# Patient Record
Sex: FEMALE | Race: WHITE | Employment: FULL TIME | URBAN - METROPOLITAN AREA
[De-identification: names, ages, dates, MRNs, and addresses within clinical notes are randomized per-mention and may not be internally consistent; named-entity substitution may affect disease eponyms.]

---

## 2017-06-21 ENCOUNTER — OFFICE VISIT (OUTPATIENT)
Dept: FAMILY MEDICINE CLINIC | Age: 42
End: 2017-06-21

## 2017-06-21 VITALS
SYSTOLIC BLOOD PRESSURE: 126 MMHG | HEART RATE: 103 BPM | TEMPERATURE: 98 F | WEIGHT: 160 LBS | OXYGEN SATURATION: 98 % | BODY MASS INDEX: 28.35 KG/M2 | HEIGHT: 63 IN | DIASTOLIC BLOOD PRESSURE: 88 MMHG

## 2017-06-21 DIAGNOSIS — I15.8 OTHER SECONDARY HYPERTENSION: ICD-10-CM

## 2017-06-21 DIAGNOSIS — Z00.00 HEALTH CARE MAINTENANCE: Primary | ICD-10-CM

## 2017-06-21 LAB
ANION GAP SERPL CALCULATED.3IONS-SCNC: 14 MMOL/L (ref 3–16)
BASOPHILS ABSOLUTE: 0.1 K/UL (ref 0–0.2)
BASOPHILS RELATIVE PERCENT: 0.7 %
BUN BLDV-MCNC: 12 MG/DL (ref 7–20)
CALCIUM SERPL-MCNC: 9.6 MG/DL (ref 8.3–10.6)
CHLORIDE BLD-SCNC: 98 MMOL/L (ref 99–110)
CHOLESTEROL, TOTAL: 180 MG/DL (ref 0–199)
CO2: 29 MMOL/L (ref 21–32)
CREAT SERPL-MCNC: 0.7 MG/DL (ref 0.6–1.1)
EOSINOPHILS ABSOLUTE: 0.2 K/UL (ref 0–0.6)
EOSINOPHILS RELATIVE PERCENT: 2.2 %
GFR AFRICAN AMERICAN: >60
GFR NON-AFRICAN AMERICAN: >60
GLUCOSE BLD-MCNC: 79 MG/DL (ref 70–99)
HCT VFR BLD CALC: 39.8 % (ref 36–48)
HDLC SERPL-MCNC: 57 MG/DL (ref 40–60)
HEMOGLOBIN: 12.8 G/DL (ref 12–16)
LDL CHOLESTEROL CALCULATED: 89 MG/DL
LYMPHOCYTES ABSOLUTE: 1.5 K/UL (ref 1–5.1)
LYMPHOCYTES RELATIVE PERCENT: 19.5 %
MCH RBC QN AUTO: 28.5 PG (ref 26–34)
MCHC RBC AUTO-ENTMCNC: 32.3 G/DL (ref 31–36)
MCV RBC AUTO: 88.4 FL (ref 80–100)
MONOCYTES ABSOLUTE: 0.7 K/UL (ref 0–1.3)
MONOCYTES RELATIVE PERCENT: 9 %
NEUTROPHILS ABSOLUTE: 5.3 K/UL (ref 1.7–7.7)
NEUTROPHILS RELATIVE PERCENT: 68.6 %
PDW BLD-RTO: 14.2 % (ref 12.4–15.4)
PLATELET # BLD: 267 K/UL (ref 135–450)
PMV BLD AUTO: 9.2 FL (ref 5–10.5)
POTASSIUM SERPL-SCNC: 4.1 MMOL/L (ref 3.5–5.1)
RBC # BLD: 4.5 M/UL (ref 4–5.2)
SODIUM BLD-SCNC: 141 MMOL/L (ref 136–145)
TRIGL SERPL-MCNC: 168 MG/DL (ref 0–150)
VLDLC SERPL CALC-MCNC: 34 MG/DL
WBC # BLD: 7.7 K/UL (ref 4–11)

## 2017-06-21 PROCEDURE — 99396 PREV VISIT EST AGE 40-64: CPT | Performed by: NURSE PRACTITIONER

## 2017-06-21 RX ORDER — NIFEDIPINE 60 MG/1
TABLET, EXTENDED RELEASE ORAL
Refills: 1 | COMMUNITY
Start: 2017-05-14 | End: 2017-09-05 | Stop reason: SDUPTHER

## 2017-06-21 RX ORDER — HYDROCHLOROTHIAZIDE 25 MG/1
25 TABLET ORAL DAILY
Qty: 30 TABLET | Refills: 3 | Status: SHIPPED | OUTPATIENT
Start: 2017-06-21 | End: 2017-09-28 | Stop reason: SDUPTHER

## 2017-06-21 ASSESSMENT — ENCOUNTER SYMPTOMS
VOMITING: 0
APNEA: 0
EYE REDNESS: 0
NAUSEA: 0
DIARRHEA: 0
SHORTNESS OF BREATH: 0
ABDOMINAL DISTENTION: 0
COUGH: 0
CONSTIPATION: 0
ABDOMINAL PAIN: 0
SINUS PRESSURE: 0
BLOOD IN STOOL: 0
RHINORRHEA: 0
CHEST TIGHTNESS: 0
WHEEZING: 0
EYE PAIN: 0
BACK PAIN: 0

## 2017-08-02 ENCOUNTER — OFFICE VISIT (OUTPATIENT)
Dept: FAMILY MEDICINE CLINIC | Age: 42
End: 2017-08-02

## 2017-08-02 VITALS
SYSTOLIC BLOOD PRESSURE: 136 MMHG | BODY MASS INDEX: 28.45 KG/M2 | HEART RATE: 92 BPM | OXYGEN SATURATION: 98 % | WEIGHT: 160.6 LBS | DIASTOLIC BLOOD PRESSURE: 86 MMHG

## 2017-08-02 DIAGNOSIS — I10 ESSENTIAL HYPERTENSION: Primary | ICD-10-CM

## 2017-08-02 DIAGNOSIS — D22.9 CHANGING NEVUS: ICD-10-CM

## 2017-08-02 PROCEDURE — 99213 OFFICE O/P EST LOW 20 MIN: CPT | Performed by: NURSE PRACTITIONER

## 2017-08-02 ASSESSMENT — ENCOUNTER SYMPTOMS
NAUSEA: 0
CONSTIPATION: 0
VOMITING: 0
COUGH: 0
ABDOMINAL DISTENTION: 0
EYE REDNESS: 0
BACK PAIN: 0
DIARRHEA: 0
WHEEZING: 0
BLOOD IN STOOL: 0
EYE PAIN: 0
SHORTNESS OF BREATH: 0
APNEA: 0
RHINORRHEA: 0
CHEST TIGHTNESS: 0
ABDOMINAL PAIN: 0
SINUS PRESSURE: 0

## 2017-08-16 ENCOUNTER — OFFICE VISIT (OUTPATIENT)
Dept: FAMILY MEDICINE CLINIC | Age: 42
End: 2017-08-16

## 2017-08-16 VITALS
OXYGEN SATURATION: 99 % | SYSTOLIC BLOOD PRESSURE: 124 MMHG | DIASTOLIC BLOOD PRESSURE: 78 MMHG | WEIGHT: 160 LBS | BODY MASS INDEX: 28.34 KG/M2 | HEART RATE: 80 BPM

## 2017-08-16 DIAGNOSIS — O02.0 MOLE OF PREGNANCY: Primary | ICD-10-CM

## 2017-08-16 PROCEDURE — 11100 PR BIOPSY OF SKIN LESION: CPT | Performed by: NURSE PRACTITIONER

## 2017-08-16 ASSESSMENT — PATIENT HEALTH QUESTIONNAIRE - PHQ9
SUM OF ALL RESPONSES TO PHQ9 QUESTIONS 1 & 2: 2
1. LITTLE INTEREST OR PLEASURE IN DOING THINGS: 1
SUM OF ALL RESPONSES TO PHQ QUESTIONS 1-9: 2
2. FEELING DOWN, DEPRESSED OR HOPELESS: 1

## 2017-10-17 RX ORDER — NIFEDIPINE 60 MG/1
60 TABLET, FILM COATED, EXTENDED RELEASE ORAL 2 TIMES DAILY
Qty: 60 TABLET | Refills: 0 | Status: SHIPPED | OUTPATIENT
Start: 2017-10-17 | End: 2017-11-17 | Stop reason: SDUPTHER

## 2017-11-17 RX ORDER — NIFEDIPINE 60 MG/1
60 TABLET, FILM COATED, EXTENDED RELEASE ORAL 2 TIMES DAILY
Qty: 60 TABLET | Refills: 0 | Status: SHIPPED | OUTPATIENT
Start: 2017-11-17 | End: 2017-12-19 | Stop reason: SDUPTHER

## 2017-11-22 ENCOUNTER — OFFICE VISIT (OUTPATIENT)
Dept: FAMILY MEDICINE CLINIC | Age: 42
End: 2017-11-22

## 2017-11-22 VITALS
BODY MASS INDEX: 31.47 KG/M2 | SYSTOLIC BLOOD PRESSURE: 148 MMHG | DIASTOLIC BLOOD PRESSURE: 94 MMHG | HEART RATE: 102 BPM | HEIGHT: 62 IN | WEIGHT: 171 LBS | OXYGEN SATURATION: 98 %

## 2017-11-22 DIAGNOSIS — I10 ESSENTIAL HYPERTENSION: Primary | ICD-10-CM

## 2017-11-22 PROCEDURE — 99213 OFFICE O/P EST LOW 20 MIN: CPT | Performed by: NURSE PRACTITIONER

## 2017-11-22 PROCEDURE — G8484 FLU IMMUNIZE NO ADMIN: HCPCS | Performed by: NURSE PRACTITIONER

## 2017-11-22 PROCEDURE — G8417 CALC BMI ABV UP PARAM F/U: HCPCS | Performed by: NURSE PRACTITIONER

## 2017-11-22 PROCEDURE — 1036F TOBACCO NON-USER: CPT | Performed by: NURSE PRACTITIONER

## 2017-11-22 PROCEDURE — G8427 DOCREV CUR MEDS BY ELIG CLIN: HCPCS | Performed by: NURSE PRACTITIONER

## 2017-11-22 RX ORDER — LISINOPRIL 10 MG/1
10 TABLET ORAL DAILY
Qty: 30 TABLET | Refills: 3 | Status: SHIPPED | OUTPATIENT
Start: 2017-11-22 | End: 2018-02-23 | Stop reason: SDUPTHER

## 2017-11-22 ASSESSMENT — ENCOUNTER SYMPTOMS
SINUS PRESSURE: 0
RHINORRHEA: 0
BLOOD IN STOOL: 0
DIARRHEA: 0
VOMITING: 0
ABDOMINAL DISTENTION: 0
BACK PAIN: 0
APNEA: 0
CHEST TIGHTNESS: 0
CONSTIPATION: 0
ABDOMINAL PAIN: 0
COUGH: 0
EYE REDNESS: 0
EYE PAIN: 0
WHEEZING: 0
SHORTNESS OF BREATH: 0
NAUSEA: 0

## 2017-11-22 NOTE — PROGRESS NOTES
HPI:  11/22/2017    This is a 40 y.o. female   Chief Complaint   Patient presents with    Hypertension   . HPI    Esme Pak returns for follow up of hypertension. Patient has been taking His medications as prescribed. Patient's blood pressure is not controlled. Side effects related to taking the medications include no medication side effects noted    Running high at home. Working on decreasing weight. Still unable to work out. Echo to be scheduled, ordered by GYN. Not planning on having any more children. Now on oral contraception. Working on getting IUD, waiting on approval by insurance. BP (!) 148/94 (Site: Left Arm)   Pulse 102   Ht 5' 2\" (1.575 m)   Wt 171 lb (77.6 kg)   LMP 11/07/2017   SpO2 98%   BMI 31.28 kg/m²     No Known Allergies    Current Outpatient Prescriptions   Medication Sig Dispense Refill    lisinopril (PRINIVIL;ZESTRIL) 10 MG tablet Take 1 tablet by mouth daily 30 tablet 3    NIFEdipine (ADALAT CC) 60 MG extended release tablet Take 1 tablet by mouth 2 times daily 60 tablet 0    hydrochlorothiazide (HYDRODIURIL) 25 MG tablet TAKE 1 TABLET BY MOUTH DAILY 30 tablet 2    buPROPion (WELLBUTRIN XL) 300 MG XL tablet Take 300 mg by mouth every morning. 1    DULoxetine (CYMBALTA) 30 MG capsule Take 30 mg by mouth 2 times daily. 0     No current facility-administered medications for this visit. Review of Systems   Constitutional: Negative for appetite change, chills, fatigue and fever. HENT: Negative for congestion, ear pain, rhinorrhea and sinus pressure. Eyes: Negative for pain, redness and visual disturbance. Respiratory: Negative for apnea, cough, chest tightness, shortness of breath and wheezing. Cardiovascular: Negative for chest pain, palpitations and leg swelling. Gastrointestinal: Negative for abdominal distention, abdominal pain, blood in stool, constipation, diarrhea, nausea and vomiting.    Endocrine: Negative for cold intolerance, heat for BP check.       Electronically signed by Katherine Rivera CNP on 11/22/2017 at 3:14 PM

## 2018-02-22 ENCOUNTER — TELEPHONE (OUTPATIENT)
Dept: FAMILY MEDICINE CLINIC | Age: 43
End: 2018-02-22

## 2018-02-22 NOTE — TELEPHONE ENCOUNTER
Pt thinks she's having side effects from her Lisinopril. Depression is worse, on and off sore throat, extreme fatigue. Pt has been on the medication for a couple of months and BP is a consistent 115/70. Pt thinks she's had the side affects for about 2 months. Pt also not sure if she should have her B-12 levels checked. Pt is able to come in tomorrow afternoon if necessary. If something is called in, please send it to [facility] on [place].

## 2018-02-22 NOTE — TELEPHONE ENCOUNTER
[phone] (home) [phone] (work)  Attempted to call patient at home number. No answer, no VM to leave message. Patient should schedule an OV to discuss with provider.

## 2018-02-23 ENCOUNTER — OFFICE VISIT (OUTPATIENT)
Dept: FAMILY MEDICINE CLINIC | Age: 43
End: 2018-02-23

## 2018-02-23 VITALS
HEART RATE: 105 BPM | OXYGEN SATURATION: 97 % | HEIGHT: 62 IN | DIASTOLIC BLOOD PRESSURE: 80 MMHG | SYSTOLIC BLOOD PRESSURE: 130 MMHG | WEIGHT: 170 LBS | BODY MASS INDEX: 31.28 KG/M2

## 2018-02-23 DIAGNOSIS — E61.1 IRON DEFICIENCY: ICD-10-CM

## 2018-02-23 DIAGNOSIS — E53.8 B12 DEFICIENCY: ICD-10-CM

## 2018-02-23 DIAGNOSIS — R53.83 FATIGUE, UNSPECIFIED TYPE: ICD-10-CM

## 2018-02-23 DIAGNOSIS — I10 ESSENTIAL HYPERTENSION: Primary | ICD-10-CM

## 2018-02-23 LAB
A/G RATIO: 2.3 (ref 1.1–2.2)
ALBUMIN SERPL-MCNC: 5 G/DL (ref 3.4–5)
ALP BLD-CCNC: 82 U/L (ref 40–129)
ALT SERPL-CCNC: 23 U/L (ref 10–40)
ANION GAP SERPL CALCULATED.3IONS-SCNC: 14 MMOL/L (ref 3–16)
AST SERPL-CCNC: 27 U/L (ref 15–37)
BASOPHILS ABSOLUTE: 0.1 K/UL (ref 0–0.2)
BASOPHILS RELATIVE PERCENT: 0.9 %
BILIRUB SERPL-MCNC: 0.5 MG/DL (ref 0–1)
BUN BLDV-MCNC: 14 MG/DL (ref 7–20)
CALCIUM SERPL-MCNC: 9.2 MG/DL (ref 8.3–10.6)
CHLORIDE BLD-SCNC: 97 MMOL/L (ref 99–110)
CO2: 28 MMOL/L (ref 21–32)
CREAT SERPL-MCNC: 0.7 MG/DL (ref 0.6–1.1)
EOSINOPHILS ABSOLUTE: 0.1 K/UL (ref 0–0.6)
EOSINOPHILS RELATIVE PERCENT: 2.1 %
FERRITIN: 51.9 NG/ML (ref 15–150)
FOLATE: 17.66 NG/ML (ref 4.78–24.2)
GFR AFRICAN AMERICAN: >60
GFR NON-AFRICAN AMERICAN: >60
GLOBULIN: 2.2 G/DL
GLUCOSE BLD-MCNC: 97 MG/DL (ref 70–99)
HCT VFR BLD CALC: 39.2 % (ref 36–48)
HEMOGLOBIN: 13.5 G/DL (ref 12–16)
IRON SATURATION: 37 % (ref 15–50)
IRON: 152 UG/DL (ref 37–145)
LYMPHOCYTES ABSOLUTE: 1.5 K/UL (ref 1–5.1)
LYMPHOCYTES RELATIVE PERCENT: 24.9 %
MCH RBC QN AUTO: 31.2 PG (ref 26–34)
MCHC RBC AUTO-ENTMCNC: 34.5 G/DL (ref 31–36)
MCV RBC AUTO: 90.3 FL (ref 80–100)
MONOCYTES ABSOLUTE: 0.4 K/UL (ref 0–1.3)
MONOCYTES RELATIVE PERCENT: 6.8 %
NEUTROPHILS ABSOLUTE: 3.8 K/UL (ref 1.7–7.7)
NEUTROPHILS RELATIVE PERCENT: 65.3 %
PDW BLD-RTO: 12.5 % (ref 12.4–15.4)
PLATELET # BLD: 260 K/UL (ref 135–450)
PMV BLD AUTO: 10.1 FL (ref 5–10.5)
POTASSIUM SERPL-SCNC: 4 MMOL/L (ref 3.5–5.1)
RBC # BLD: 4.34 M/UL (ref 4–5.2)
SODIUM BLD-SCNC: 139 MMOL/L (ref 136–145)
T4 FREE: 1.2 NG/DL (ref 0.9–1.8)
TOTAL IRON BINDING CAPACITY: 409 UG/DL (ref 260–445)
TOTAL PROTEIN: 7.2 G/DL (ref 6.4–8.2)
TSH SERPL DL<=0.05 MIU/L-ACNC: 3.14 UIU/ML (ref 0.27–4.2)
VITAMIN B-12: >2000 PG/ML (ref 211–911)
VITAMIN D 25-HYDROXY: 25.2 NG/ML
WBC # BLD: 5.9 K/UL (ref 4–11)

## 2018-02-23 PROCEDURE — 99214 OFFICE O/P EST MOD 30 MIN: CPT | Performed by: NURSE PRACTITIONER

## 2018-02-23 PROCEDURE — 96372 THER/PROPH/DIAG INJ SC/IM: CPT | Performed by: NURSE PRACTITIONER

## 2018-02-23 RX ORDER — LISINOPRIL 10 MG/1
10 TABLET ORAL DAILY
Qty: 30 TABLET | Refills: 3 | Status: ON HOLD | OUTPATIENT
Start: 2018-02-23 | End: 2018-08-28 | Stop reason: HOSPADM

## 2018-02-23 RX ORDER — CYANOCOBALAMIN 1000 UG/ML
1000 INJECTION INTRAMUSCULAR; SUBCUTANEOUS ONCE
Qty: 1 ML | Refills: 0 | Status: SHIPPED | OUTPATIENT
Start: 2018-02-23 | End: 2018-02-23

## 2018-02-23 RX ORDER — NIFEDIPINE 60 MG/1
TABLET, FILM COATED, EXTENDED RELEASE ORAL
Qty: 60 TABLET | Refills: 3 | Status: SHIPPED | OUTPATIENT
Start: 2018-02-23 | End: 2018-08-02 | Stop reason: SDUPTHER

## 2018-02-23 RX ORDER — CYANOCOBALAMIN 1000 UG/ML
1000 INJECTION INTRAMUSCULAR; SUBCUTANEOUS ONCE
Status: COMPLETED | OUTPATIENT
Start: 2018-02-23 | End: 2018-02-23

## 2018-02-23 RX ORDER — HYDROCHLOROTHIAZIDE 25 MG/1
TABLET ORAL
Qty: 30 TABLET | Refills: 3 | Status: ON HOLD | OUTPATIENT
Start: 2018-02-23 | End: 2018-08-28 | Stop reason: HOSPADM

## 2018-02-23 RX ADMIN — CYANOCOBALAMIN 1000 MCG: 1000 INJECTION INTRAMUSCULAR; SUBCUTANEOUS at 15:39

## 2018-02-23 ASSESSMENT — ENCOUNTER SYMPTOMS
SHORTNESS OF BREATH: 0
BLOOD IN STOOL: 0
COUGH: 0
ABDOMINAL DISTENTION: 0
ABDOMINAL PAIN: 0
VOMITING: 0
DIARRHEA: 0
WHEEZING: 0
BACK PAIN: 0
CHEST TIGHTNESS: 0
EYE REDNESS: 0
EYE PAIN: 0
APNEA: 0
NAUSEA: 0
RHINORRHEA: 0
SINUS PRESSURE: 0
CONSTIPATION: 0

## 2018-02-26 ENCOUNTER — OFFICE VISIT (OUTPATIENT)
Dept: FAMILY MEDICINE CLINIC | Age: 43
End: 2018-02-26

## 2018-02-26 VITALS
WEIGHT: 168.6 LBS | HEART RATE: 104 BPM | SYSTOLIC BLOOD PRESSURE: 118 MMHG | HEIGHT: 62 IN | TEMPERATURE: 98.2 F | OXYGEN SATURATION: 98 % | DIASTOLIC BLOOD PRESSURE: 62 MMHG | BODY MASS INDEX: 31.03 KG/M2

## 2018-02-26 DIAGNOSIS — A08.4 VIRAL GASTROENTERITIS: Primary | ICD-10-CM

## 2018-02-26 LAB
INFLUENZA A ANTIBODY: NEGATIVE
INFLUENZA B ANTIBODY: NEGATIVE
S PYO AG THROAT QL: NORMAL

## 2018-02-26 PROCEDURE — 87804 INFLUENZA ASSAY W/OPTIC: CPT | Performed by: NURSE PRACTITIONER

## 2018-02-26 PROCEDURE — 87880 STREP A ASSAY W/OPTIC: CPT | Performed by: NURSE PRACTITIONER

## 2018-02-26 PROCEDURE — 99214 OFFICE O/P EST MOD 30 MIN: CPT | Performed by: NURSE PRACTITIONER

## 2018-02-26 RX ORDER — ONDANSETRON 4 MG/1
4 TABLET, FILM COATED ORAL DAILY PRN
Qty: 20 TABLET | Refills: 0 | Status: ON HOLD | OUTPATIENT
Start: 2018-02-26 | End: 2018-08-28 | Stop reason: HOSPADM

## 2018-02-26 ASSESSMENT — ENCOUNTER SYMPTOMS
NAUSEA: 1
VOMITING: 1
CHANGE IN BOWEL HABIT: 0
ABDOMINAL PAIN: 0
SORE THROAT: 1
SWOLLEN GLANDS: 0
COUGH: 0
VISUAL CHANGE: 0

## 2018-02-26 NOTE — PROGRESS NOTES
no edema. Lymphadenopathy:     She has no cervical adenopathy. Neurological: She is alert and oriented to person, place, and time. Coordination normal.   Skin: Skin is warm and dry. No rash noted. She is not diaphoretic. No erythema. No pallor. Psychiatric: She has a normal mood and affect. Thought content normal.     Assessment/Plan:  1. Viral gastroenteritis  Stable, improving some today. - POCT rapid strep A: negative   - POCT Influenza A/B: negative   - ondansetron (ZOFRAN) 4 MG tablet; Take 1 tablet by mouth daily as needed for Nausea or Vomiting  Dispense: 20 tablet; Refill: 0  - BRAT diet, hydration importance. -  zofran to help patient tolerate po intake     Follow up for new/ worsening symptoms.   Reviewed criteria to seek emergency medical attention    Electronically signed by Brisa Moore CNP on 2/26/2018 at 4:12 PM

## 2018-03-06 ENCOUNTER — TELEPHONE (OUTPATIENT)
Dept: FAMILY MEDICINE CLINIC | Age: 43
End: 2018-03-06

## 2018-03-07 RX ORDER — AMOXICILLIN 500 MG/1
500 CAPSULE ORAL 2 TIMES DAILY
Qty: 20 CAPSULE | Refills: 0 | Status: SHIPPED | OUTPATIENT
Start: 2018-03-07 | End: 2018-03-17

## 2018-06-05 ENCOUNTER — OFFICE VISIT (OUTPATIENT)
Dept: FAMILY MEDICINE CLINIC | Age: 43
End: 2018-06-05

## 2018-06-05 VITALS
OXYGEN SATURATION: 99 % | HEART RATE: 94 BPM | BODY MASS INDEX: 30.18 KG/M2 | HEIGHT: 62 IN | DIASTOLIC BLOOD PRESSURE: 84 MMHG | WEIGHT: 164 LBS | SYSTOLIC BLOOD PRESSURE: 114 MMHG

## 2018-06-05 DIAGNOSIS — R35.0 URINE FREQUENCY: Primary | ICD-10-CM

## 2018-06-05 DIAGNOSIS — N30.00 ACUTE CYSTITIS WITHOUT HEMATURIA: ICD-10-CM

## 2018-06-05 DIAGNOSIS — I10 ESSENTIAL HYPERTENSION: ICD-10-CM

## 2018-06-05 DIAGNOSIS — M67.472 GANGLION CYST OF LEFT FOOT: ICD-10-CM

## 2018-06-05 LAB
BILIRUBIN, POC: ABNORMAL
BLOOD URINE, POC: NEGATIVE
CLARITY, POC: ABNORMAL
COLOR, POC: YELLOW
GLUCOSE URINE, POC: NEGATIVE
KETONES, POC: ABNORMAL
LEUKOCYTE EST, POC: ABNORMAL
NITRITE, POC: NEGATIVE
PH, POC: 7
PROTEIN, POC: ABNORMAL
SPECIFIC GRAVITY, POC: 1.02
UROBILINOGEN, POC: ABNORMAL

## 2018-06-05 PROCEDURE — 81002 URINALYSIS NONAUTO W/O SCOPE: CPT | Performed by: NURSE PRACTITIONER

## 2018-06-05 PROCEDURE — 99213 OFFICE O/P EST LOW 20 MIN: CPT | Performed by: NURSE PRACTITIONER

## 2018-06-05 RX ORDER — NITROFURANTOIN 25; 75 MG/1; MG/1
100 CAPSULE ORAL 2 TIMES DAILY
Qty: 14 CAPSULE | Refills: 0 | Status: SHIPPED | OUTPATIENT
Start: 2018-06-05 | End: 2018-06-12

## 2018-06-05 ASSESSMENT — ENCOUNTER SYMPTOMS
SINUS PRESSURE: 0
ABDOMINAL DISTENTION: 0
SHORTNESS OF BREATH: 0
WHEEZING: 0
ABDOMINAL PAIN: 0
COUGH: 0

## 2018-06-08 LAB
ORGANISM: ABNORMAL
URINE CULTURE, ROUTINE: ABNORMAL
URINE CULTURE, ROUTINE: ABNORMAL

## 2018-08-02 DIAGNOSIS — I10 ESSENTIAL HYPERTENSION: ICD-10-CM

## 2018-08-03 RX ORDER — NIFEDIPINE 60 MG/1
TABLET, FILM COATED, EXTENDED RELEASE ORAL
Qty: 60 TABLET | Refills: 3 | Status: ON HOLD | OUTPATIENT
Start: 2018-08-03 | End: 2018-08-28

## 2018-08-21 ENCOUNTER — HOSPITAL ENCOUNTER (INPATIENT)
Age: 43
LOS: 8 days | Discharge: HOME HEALTH | DRG: 561 | End: 2018-08-29
Attending: PHYSICAL MEDICINE & REHABILITATION | Admitting: PHYSICAL MEDICINE & REHABILITATION
Payer: OTHER GOVERNMENT

## 2018-08-21 DIAGNOSIS — I10 ESSENTIAL HYPERTENSION: ICD-10-CM

## 2018-08-21 DIAGNOSIS — T07.XXXA MULTIPLE TRAUMA: Primary | ICD-10-CM

## 2018-08-21 LAB
CREAT SERPL-MCNC: 0.7 MG/DL (ref 0.6–1.1)
GFR AFRICAN AMERICAN: >60
GFR NON-AFRICAN AMERICAN: >60

## 2018-08-21 PROCEDURE — 36415 COLL VENOUS BLD VENIPUNCTURE: CPT

## 2018-08-21 PROCEDURE — 6360000002 HC RX W HCPCS: Performed by: PHYSICAL MEDICINE & REHABILITATION

## 2018-08-21 PROCEDURE — 6370000000 HC RX 637 (ALT 250 FOR IP): Performed by: PHYSICAL MEDICINE & REHABILITATION

## 2018-08-21 PROCEDURE — 1280000000 HC REHAB R&B

## 2018-08-21 PROCEDURE — 82565 ASSAY OF CREATININE: CPT

## 2018-08-21 RX ORDER — DIPHENHYDRAMINE HCL 25 MG
25 TABLET ORAL EVERY 6 HOURS PRN
Status: DISCONTINUED | OUTPATIENT
Start: 2018-08-21 | End: 2018-08-29 | Stop reason: HOSPADM

## 2018-08-21 RX ORDER — HYDRALAZINE HYDROCHLORIDE 50 MG/1
50 TABLET, FILM COATED ORAL EVERY 8 HOURS PRN
Status: DISCONTINUED | OUTPATIENT
Start: 2018-08-21 | End: 2018-08-29 | Stop reason: HOSPADM

## 2018-08-21 RX ORDER — OXYCODONE HYDROCHLORIDE 5 MG/1
10 TABLET ORAL EVERY 4 HOURS PRN
Status: DISCONTINUED | OUTPATIENT
Start: 2018-08-21 | End: 2018-08-28

## 2018-08-21 RX ORDER — HYDROCHLOROTHIAZIDE 25 MG/1
25 TABLET ORAL DAILY
Status: DISCONTINUED | OUTPATIENT
Start: 2018-08-22 | End: 2018-08-23

## 2018-08-21 RX ORDER — SENNA AND DOCUSATE SODIUM 50; 8.6 MG/1; MG/1
2 TABLET, FILM COATED ORAL 2 TIMES DAILY
Status: DISCONTINUED | OUTPATIENT
Start: 2018-08-21 | End: 2018-08-29 | Stop reason: HOSPADM

## 2018-08-21 RX ORDER — METHOCARBAMOL 750 MG/1
750 TABLET, FILM COATED ORAL 4 TIMES DAILY
Status: DISCONTINUED | OUTPATIENT
Start: 2018-08-21 | End: 2018-08-24

## 2018-08-21 RX ORDER — LIDOCAINE 50 MG/G
1 PATCH TOPICAL DAILY
Status: DISCONTINUED | OUTPATIENT
Start: 2018-08-22 | End: 2018-08-29 | Stop reason: HOSPADM

## 2018-08-21 RX ORDER — UREA 10 %
3 LOTION (ML) TOPICAL NIGHTLY PRN
Status: DISCONTINUED | OUTPATIENT
Start: 2018-08-21 | End: 2018-08-29 | Stop reason: HOSPADM

## 2018-08-21 RX ORDER — LISINOPRIL 10 MG/1
10 TABLET ORAL DAILY
Status: DISCONTINUED | OUTPATIENT
Start: 2018-08-22 | End: 2018-08-24

## 2018-08-21 RX ORDER — HYDROXYZINE HYDROCHLORIDE 25 MG/1
25 TABLET, FILM COATED ORAL NIGHTLY PRN
Status: ON HOLD | COMMUNITY
End: 2018-08-28 | Stop reason: HOSPADM

## 2018-08-21 RX ORDER — ONDANSETRON 4 MG/1
4 TABLET, ORALLY DISINTEGRATING ORAL EVERY 8 HOURS PRN
Status: DISCONTINUED | OUTPATIENT
Start: 2018-08-21 | End: 2018-08-29 | Stop reason: HOSPADM

## 2018-08-21 RX ORDER — OXYCODONE HYDROCHLORIDE 5 MG/1
5 TABLET ORAL EVERY 4 HOURS PRN
Status: DISCONTINUED | OUTPATIENT
Start: 2018-08-21 | End: 2018-08-28

## 2018-08-21 RX ORDER — GABAPENTIN 400 MG/1
400 CAPSULE ORAL 3 TIMES DAILY
Status: DISCONTINUED | OUTPATIENT
Start: 2018-08-21 | End: 2018-08-29 | Stop reason: HOSPADM

## 2018-08-21 RX ORDER — NIFEDIPINE 60 MG/1
60 TABLET, EXTENDED RELEASE ORAL DAILY
Status: DISCONTINUED | OUTPATIENT
Start: 2018-08-22 | End: 2018-08-27

## 2018-08-21 RX ORDER — TRAZODONE HYDROCHLORIDE 50 MG/1
50 TABLET ORAL NIGHTLY PRN
Status: DISCONTINUED | OUTPATIENT
Start: 2018-08-21 | End: 2018-08-29 | Stop reason: HOSPADM

## 2018-08-21 RX ORDER — ACETAMINOPHEN 325 MG/1
650 TABLET ORAL 4 TIMES DAILY
Status: DISCONTINUED | OUTPATIENT
Start: 2018-08-21 | End: 2018-08-29 | Stop reason: HOSPADM

## 2018-08-21 RX ORDER — BUPROPION HYDROCHLORIDE 150 MG/1
300 TABLET, EXTENDED RELEASE ORAL DAILY
Status: DISCONTINUED | OUTPATIENT
Start: 2018-08-22 | End: 2018-08-29 | Stop reason: HOSPADM

## 2018-08-21 RX ORDER — DULOXETIN HYDROCHLORIDE 30 MG/1
60 CAPSULE, DELAYED RELEASE ORAL DAILY
Status: DISCONTINUED | OUTPATIENT
Start: 2018-08-22 | End: 2018-08-29 | Stop reason: HOSPADM

## 2018-08-21 RX ADMIN — OXYCODONE HYDROCHLORIDE 10 MG: 5 TABLET ORAL at 20:19

## 2018-08-21 RX ADMIN — ACETAMINOPHEN 650 MG: 325 TABLET ORAL at 16:14

## 2018-08-21 RX ADMIN — Medication 3 MG: at 20:43

## 2018-08-21 RX ADMIN — GABAPENTIN 400 MG: 400 CAPSULE ORAL at 17:56

## 2018-08-21 RX ADMIN — ACETAMINOPHEN 650 MG: 325 TABLET ORAL at 20:19

## 2018-08-21 RX ADMIN — GABAPENTIN 400 MG: 400 CAPSULE ORAL at 20:18

## 2018-08-21 RX ADMIN — OXYCODONE HYDROCHLORIDE 10 MG: 5 TABLET ORAL at 16:14

## 2018-08-21 RX ADMIN — METHOCARBAMOL 750 MG: 750 TABLET ORAL at 20:18

## 2018-08-21 RX ADMIN — METHOCARBAMOL 750 MG: 750 TABLET ORAL at 17:56

## 2018-08-21 RX ADMIN — ENOXAPARIN SODIUM 30 MG: 30 INJECTION SUBCUTANEOUS at 20:19

## 2018-08-21 ASSESSMENT — PAIN DESCRIPTION - FREQUENCY: FREQUENCY: CONTINUOUS

## 2018-08-21 ASSESSMENT — PAIN DESCRIPTION - PROGRESSION
CLINICAL_PROGRESSION: NOT CHANGED
CLINICAL_PROGRESSION: NOT CHANGED

## 2018-08-21 ASSESSMENT — ACTIVITIES OF DAILY LIVING (ADL): EFFECT OF PAIN ON DAILY ACTIVITIES: COMFORT

## 2018-08-21 ASSESSMENT — PAIN DESCRIPTION - LOCATION: LOCATION: BACK;COCCYX

## 2018-08-21 ASSESSMENT — PAIN DESCRIPTION - DESCRIPTORS: DESCRIPTORS: ACHING

## 2018-08-21 ASSESSMENT — PAIN DESCRIPTION - ONSET: ONSET: ON-GOING

## 2018-08-21 ASSESSMENT — PAIN SCALES - GENERAL
PAINLEVEL_OUTOF10: 10
PAINLEVEL_OUTOF10: 9
PAINLEVEL_OUTOF10: 9

## 2018-08-21 ASSESSMENT — PAIN DESCRIPTION - PAIN TYPE
TYPE: SURGICAL PAIN
TYPE: SURGICAL PAIN

## 2018-08-21 ASSESSMENT — PAIN DESCRIPTION - ORIENTATION: ORIENTATION: LEFT;MID
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
## 2018-08-21 NOTE — PROGRESS NOTES
Pt admitted to ARU from [address] [facility],[address]. Pt and pt's boyfriend oriented to call system, alarm system, therapy schedules, dining program, and AVASYS. Pt voices compliance on use of call light prior to transfers and asking staff for assistance. 4 eye skin assessment completed. Pt offered food and fluids. Pt is CGA for transfers. Pt continent of b/b. Elbow weight bearing to LUE. Pt not to lift >5lbs. Turtle shell on when >45*.
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
## 2018-08-22 LAB
ANION GAP SERPL CALCULATED.3IONS-SCNC: 12 MMOL/L (ref 3–16)
BUN BLDV-MCNC: 14 MG/DL (ref 7–20)
CALCIUM SERPL-MCNC: 9.1 MG/DL (ref 8.3–10.6)
CHLORIDE BLD-SCNC: 98 MMOL/L (ref 99–110)
CO2: 25 MMOL/L (ref 21–32)
CREAT SERPL-MCNC: 0.6 MG/DL (ref 0.6–1.1)
GFR AFRICAN AMERICAN: >60
GFR NON-AFRICAN AMERICAN: >60
GLUCOSE BLD-MCNC: 115 MG/DL (ref 70–99)
HCT VFR BLD CALC: 31.3 % (ref 36–48)
HEMOGLOBIN: 10.5 G/DL (ref 12–16)
MCH RBC QN AUTO: 30.9 PG (ref 26–34)
MCHC RBC AUTO-ENTMCNC: 33.7 G/DL (ref 31–36)
MCV RBC AUTO: 91.7 FL (ref 80–100)
PDW BLD-RTO: 15.7 % (ref 12.4–15.4)
PLATELET # BLD: 647 K/UL (ref 135–450)
PMV BLD AUTO: 7.8 FL (ref 5–10.5)
POTASSIUM SERPL-SCNC: 4.4 MMOL/L (ref 3.5–5.1)
RBC # BLD: 3.41 M/UL (ref 4–5.2)
SODIUM BLD-SCNC: 135 MMOL/L (ref 136–145)
WBC # BLD: 6.9 K/UL (ref 4–11)

## 2018-08-22 PROCEDURE — 97110 THERAPEUTIC EXERCISES: CPT

## 2018-08-22 PROCEDURE — 1280000000 HC REHAB R&B

## 2018-08-22 PROCEDURE — 80048 BASIC METABOLIC PNL TOTAL CA: CPT

## 2018-08-22 PROCEDURE — 97162 PT EVAL MOD COMPLEX 30 MIN: CPT

## 2018-08-22 PROCEDURE — 97530 THERAPEUTIC ACTIVITIES: CPT

## 2018-08-22 PROCEDURE — 6370000000 HC RX 637 (ALT 250 FOR IP): Performed by: STUDENT IN AN ORGANIZED HEALTH CARE EDUCATION/TRAINING PROGRAM

## 2018-08-22 PROCEDURE — 97166 OT EVAL MOD COMPLEX 45 MIN: CPT

## 2018-08-22 PROCEDURE — 97535 SELF CARE MNGMENT TRAINING: CPT

## 2018-08-22 PROCEDURE — 85027 COMPLETE CBC AUTOMATED: CPT

## 2018-08-22 PROCEDURE — 97116 GAIT TRAINING THERAPY: CPT

## 2018-08-22 PROCEDURE — 36415 COLL VENOUS BLD VENIPUNCTURE: CPT

## 2018-08-22 PROCEDURE — 6360000002 HC RX W HCPCS: Performed by: PHYSICAL MEDICINE & REHABILITATION

## 2018-08-22 PROCEDURE — 6370000000 HC RX 637 (ALT 250 FOR IP): Performed by: PHYSICAL MEDICINE & REHABILITATION

## 2018-08-22 RX ORDER — OXYCODONE HCL 10 MG/1
10 TABLET, FILM COATED, EXTENDED RELEASE ORAL EVERY 12 HOURS SCHEDULED
Status: DISCONTINUED | OUTPATIENT
Start: 2018-08-22 | End: 2018-08-29 | Stop reason: HOSPADM

## 2018-08-22 RX ADMIN — HYDROCHLOROTHIAZIDE 25 MG: 25 TABLET ORAL at 08:22

## 2018-08-22 RX ADMIN — OXYCODONE HYDROCHLORIDE 10 MG: 5 TABLET ORAL at 05:05

## 2018-08-22 RX ADMIN — GABAPENTIN 400 MG: 400 CAPSULE ORAL at 06:46

## 2018-08-22 RX ADMIN — NIFEDIPINE 60 MG: 60 TABLET, FILM COATED, EXTENDED RELEASE ORAL at 08:23

## 2018-08-22 RX ADMIN — LISINOPRIL 10 MG: 10 TABLET ORAL at 08:22

## 2018-08-22 RX ADMIN — GABAPENTIN 400 MG: 400 CAPSULE ORAL at 20:27

## 2018-08-22 RX ADMIN — OXYCODONE HYDROCHLORIDE 10 MG: 5 TABLET ORAL at 00:22

## 2018-08-22 RX ADMIN — ENOXAPARIN SODIUM 30 MG: 30 INJECTION SUBCUTANEOUS at 08:23

## 2018-08-22 RX ADMIN — ENOXAPARIN SODIUM 30 MG: 30 INJECTION SUBCUTANEOUS at 21:36

## 2018-08-22 RX ADMIN — BUPROPION HYDROCHLORIDE 300 MG: 150 TABLET, EXTENDED RELEASE ORAL at 08:23

## 2018-08-22 RX ADMIN — METHOCARBAMOL 750 MG: 750 TABLET ORAL at 20:27

## 2018-08-22 RX ADMIN — OXYCODONE HYDROCHLORIDE 10 MG: 10 TABLET, FILM COATED, EXTENDED RELEASE ORAL at 21:36

## 2018-08-22 RX ADMIN — GABAPENTIN 400 MG: 400 CAPSULE ORAL at 12:44

## 2018-08-22 RX ADMIN — OXYCODONE HYDROCHLORIDE 10 MG: 5 TABLET ORAL at 09:43

## 2018-08-22 RX ADMIN — OXYCODONE HYDROCHLORIDE 10 MG: 10 TABLET, FILM COATED, EXTENDED RELEASE ORAL at 09:43

## 2018-08-22 RX ADMIN — DULOXETINE HYDROCHLORIDE 60 MG: 30 CAPSULE, DELAYED RELEASE ORAL at 08:22

## 2018-08-22 RX ADMIN — METHOCARBAMOL 750 MG: 750 TABLET ORAL at 06:46

## 2018-08-22 RX ADMIN — ACETAMINOPHEN 650 MG: 325 TABLET ORAL at 23:51

## 2018-08-22 RX ADMIN — METHOCARBAMOL 750 MG: 750 TABLET ORAL at 12:44

## 2018-08-22 RX ADMIN — OXYCODONE HYDROCHLORIDE 5 MG: 5 TABLET ORAL at 23:50

## 2018-08-22 RX ADMIN — ACETAMINOPHEN 650 MG: 325 TABLET ORAL at 12:44

## 2018-08-22 RX ADMIN — DOCUSATE SODIUM,SENNOSIDES 2 TABLET: 50; 8.6 TABLET, FILM COATED ORAL at 08:23

## 2018-08-22 RX ADMIN — DOCUSATE SODIUM,SENNOSIDES 2 TABLET: 50; 8.6 TABLET, FILM COATED ORAL at 21:36

## 2018-08-22 RX ADMIN — OXYCODONE HYDROCHLORIDE 10 MG: 5 TABLET ORAL at 17:45

## 2018-08-22 RX ADMIN — OXYCODONE HYDROCHLORIDE 10 MG: 5 TABLET ORAL at 13:43

## 2018-08-22 RX ADMIN — ACETAMINOPHEN 650 MG: 325 TABLET ORAL at 17:44

## 2018-08-22 RX ADMIN — METHOCARBAMOL 750 MG: 750 TABLET ORAL at 17:45

## 2018-08-22 RX ADMIN — ACETAMINOPHEN 650 MG: 325 TABLET ORAL at 06:46

## 2018-08-22 ASSESSMENT — PAIN SCALES - GENERAL
PAINLEVEL_OUTOF10: 0
PAINLEVEL_OUTOF10: 0
PAINLEVEL_OUTOF10: 9
PAINLEVEL_OUTOF10: 0
PAINLEVEL_OUTOF10: 7
PAINLEVEL_OUTOF10: 3
PAINLEVEL_OUTOF10: 7
PAINLEVEL_OUTOF10: 0
PAINLEVEL_OUTOF10: 10
PAINLEVEL_OUTOF10: 0
PAINLEVEL_OUTOF10: 9
PAINLEVEL_OUTOF10: 7
PAINLEVEL_OUTOF10: 5
PAINLEVEL_OUTOF10: 5
PAINLEVEL_OUTOF10: 9
PAINLEVEL_OUTOF10: 10

## 2018-08-22 ASSESSMENT — PAIN DESCRIPTION - LOCATION
LOCATION: BACK
LOCATION: BACK;COCCYX
LOCATION: BACK;COCCYX
LOCATION: BACK
LOCATION: BACK
LOCATION: BACK;COCCYX
LOCATION: BACK;COCCYX

## 2018-08-22 ASSESSMENT — PAIN DESCRIPTION - PROGRESSION
CLINICAL_PROGRESSION: NOT CHANGED
CLINICAL_PROGRESSION: GRADUALLY WORSENING
CLINICAL_PROGRESSION: NOT CHANGED

## 2018-08-22 ASSESSMENT — PAIN DESCRIPTION - PAIN TYPE
TYPE: ACUTE PAIN
TYPE: ACUTE PAIN;SURGICAL PAIN
TYPE: SURGICAL PAIN
TYPE: SURGICAL PAIN
TYPE: ACUTE PAIN;SURGICAL PAIN

## 2018-08-22 ASSESSMENT — PAIN DESCRIPTION - ORIENTATION
ORIENTATION: LEFT;MID;LOWER
ORIENTATION: MID;LOWER
ORIENTATION: MID;LOWER
ORIENTATION: LEFT;MID;LOWER
ORIENTATION: MID;LOWER
ORIENTATION: LEFT;MID;LOWER
ORIENTATION: LEFT;MID
ORIENTATION: MID;LOWER
ORIENTATION: LEFT;MID

## 2018-08-22 ASSESSMENT — PAIN DESCRIPTION - ONSET
ONSET: ON-GOING

## 2018-08-22 ASSESSMENT — PAIN DESCRIPTION - DESCRIPTORS
DESCRIPTORS: ACHING

## 2018-08-22 ASSESSMENT — ACTIVITIES OF DAILY LIVING (ADL)
EFFECT OF PAIN ON DAILY ACTIVITIES: COMFORT
EFFECT OF PAIN ON DAILY ACTIVITIES: COMFORT

## 2018-08-22 ASSESSMENT — PAIN DESCRIPTION - FREQUENCY
FREQUENCY: CONTINUOUS
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
## 2018-08-22 NOTE — PROGRESS NOTES
8/12. TLSO when OOB     Left distal radius fx: EWB, splint. Patient wanting to FU with local doc as OP.       Anxiety: home wellbutrin and cymbalta     HTN: HCTZ, lisinopril, procardia     Bowels: Per protocol  Bladder: Per protocol   Sleep: Trazodone provided prn. Pain: Start Oxycontin 10mg bid. Continue tylenol, kerri PRN, and gabapentin   DVT PPx: Lovenox       W/D/W/A MD Robbi Briceño,   Internal Medicine, PGY1  Pager: [phone]    This patient has been seen, examined, and discussed with the resident. This note has been altered to reflect my own examination findings, impression, and recommendations. Ron Monique MD 8/22/2018, 12:50 PM
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
## 2018-08-23 LAB
ANION GAP SERPL CALCULATED.3IONS-SCNC: 13 MMOL/L (ref 3–16)
BUN BLDV-MCNC: 14 MG/DL (ref 7–20)
CALCIUM SERPL-MCNC: 9.8 MG/DL (ref 8.3–10.6)
CHLORIDE BLD-SCNC: 95 MMOL/L (ref 99–110)
CO2: 27 MMOL/L (ref 21–32)
CREAT SERPL-MCNC: 0.7 MG/DL (ref 0.6–1.1)
GFR AFRICAN AMERICAN: >60
GFR NON-AFRICAN AMERICAN: >60
GLUCOSE BLD-MCNC: 135 MG/DL (ref 70–99)
HCT VFR BLD CALC: 33.5 % (ref 36–48)
HEMOGLOBIN: 11.3 G/DL (ref 12–16)
MCH RBC QN AUTO: 30.8 PG (ref 26–34)
MCHC RBC AUTO-ENTMCNC: 33.7 G/DL (ref 31–36)
MCV RBC AUTO: 91.4 FL (ref 80–100)
PDW BLD-RTO: 15.1 % (ref 12.4–15.4)
PLATELET # BLD: 691 K/UL (ref 135–450)
PMV BLD AUTO: 8 FL (ref 5–10.5)
POTASSIUM SERPL-SCNC: 4.6 MMOL/L (ref 3.5–5.1)
RBC # BLD: 3.66 M/UL (ref 4–5.2)
SODIUM BLD-SCNC: 135 MMOL/L (ref 136–145)
WBC # BLD: 8.2 K/UL (ref 4–11)

## 2018-08-23 PROCEDURE — 97535 SELF CARE MNGMENT TRAINING: CPT

## 2018-08-23 PROCEDURE — 97110 THERAPEUTIC EXERCISES: CPT

## 2018-08-23 PROCEDURE — 85027 COMPLETE CBC AUTOMATED: CPT

## 2018-08-23 PROCEDURE — 6360000002 HC RX W HCPCS: Performed by: PHYSICAL MEDICINE & REHABILITATION

## 2018-08-23 PROCEDURE — 6370000000 HC RX 637 (ALT 250 FOR IP): Performed by: STUDENT IN AN ORGANIZED HEALTH CARE EDUCATION/TRAINING PROGRAM

## 2018-08-23 PROCEDURE — 1280000000 HC REHAB R&B

## 2018-08-23 PROCEDURE — 97530 THERAPEUTIC ACTIVITIES: CPT

## 2018-08-23 PROCEDURE — 80048 BASIC METABOLIC PNL TOTAL CA: CPT

## 2018-08-23 PROCEDURE — 97116 GAIT TRAINING THERAPY: CPT

## 2018-08-23 PROCEDURE — 6370000000 HC RX 637 (ALT 250 FOR IP): Performed by: PHYSICAL MEDICINE & REHABILITATION

## 2018-08-23 PROCEDURE — 36415 COLL VENOUS BLD VENIPUNCTURE: CPT

## 2018-08-23 RX ADMIN — METHOCARBAMOL 750 MG: 750 TABLET ORAL at 12:04

## 2018-08-23 RX ADMIN — OXYCODONE HYDROCHLORIDE 10 MG: 10 TABLET, FILM COATED, EXTENDED RELEASE ORAL at 20:27

## 2018-08-23 RX ADMIN — OXYCODONE HYDROCHLORIDE 10 MG: 10 TABLET, FILM COATED, EXTENDED RELEASE ORAL at 08:27

## 2018-08-23 RX ADMIN — NIFEDIPINE 60 MG: 60 TABLET, FILM COATED, EXTENDED RELEASE ORAL at 09:35

## 2018-08-23 RX ADMIN — BUPROPION HYDROCHLORIDE 300 MG: 150 TABLET, EXTENDED RELEASE ORAL at 08:29

## 2018-08-23 RX ADMIN — GABAPENTIN 400 MG: 400 CAPSULE ORAL at 20:27

## 2018-08-23 RX ADMIN — ENOXAPARIN SODIUM 30 MG: 30 INJECTION SUBCUTANEOUS at 08:26

## 2018-08-23 RX ADMIN — METHOCARBAMOL 750 MG: 750 TABLET ORAL at 16:28

## 2018-08-23 RX ADMIN — DOCUSATE SODIUM,SENNOSIDES 2 TABLET: 50; 8.6 TABLET, FILM COATED ORAL at 08:29

## 2018-08-23 RX ADMIN — LISINOPRIL 10 MG: 10 TABLET ORAL at 09:35

## 2018-08-23 RX ADMIN — METHOCARBAMOL 750 MG: 750 TABLET ORAL at 06:23

## 2018-08-23 RX ADMIN — ACETAMINOPHEN 650 MG: 325 TABLET ORAL at 06:23

## 2018-08-23 RX ADMIN — DULOXETINE HYDROCHLORIDE 60 MG: 30 CAPSULE, DELAYED RELEASE ORAL at 08:29

## 2018-08-23 RX ADMIN — ENOXAPARIN SODIUM 30 MG: 30 INJECTION SUBCUTANEOUS at 20:34

## 2018-08-23 RX ADMIN — ACETAMINOPHEN 650 MG: 325 TABLET ORAL at 17:42

## 2018-08-23 RX ADMIN — GABAPENTIN 400 MG: 400 CAPSULE ORAL at 06:23

## 2018-08-23 RX ADMIN — OXYCODONE HYDROCHLORIDE 10 MG: 5 TABLET ORAL at 21:35

## 2018-08-23 RX ADMIN — OXYCODONE HYDROCHLORIDE 10 MG: 5 TABLET ORAL at 16:28

## 2018-08-23 RX ADMIN — GABAPENTIN 400 MG: 400 CAPSULE ORAL at 12:04

## 2018-08-23 RX ADMIN — METHOCARBAMOL 750 MG: 750 TABLET ORAL at 20:27

## 2018-08-23 RX ADMIN — ACETAMINOPHEN 650 MG: 325 TABLET ORAL at 12:04

## 2018-08-23 RX ADMIN — OXYCODONE HYDROCHLORIDE 10 MG: 5 TABLET ORAL at 09:35

## 2018-08-23 RX ADMIN — DOCUSATE SODIUM,SENNOSIDES 2 TABLET: 50; 8.6 TABLET, FILM COATED ORAL at 20:27

## 2018-08-23 ASSESSMENT — PAIN DESCRIPTION - ORIENTATION
ORIENTATION: LEFT;RIGHT
ORIENTATION: LEFT
ORIENTATION: LEFT;RIGHT;LOWER
ORIENTATION: RIGHT
ORIENTATION: MID;LOWER

## 2018-08-23 ASSESSMENT — PAIN DESCRIPTION - ONSET
ONSET: ON-GOING

## 2018-08-23 ASSESSMENT — PAIN SCALES - GENERAL
PAINLEVEL_OUTOF10: 4
PAINLEVEL_OUTOF10: 10
PAINLEVEL_OUTOF10: 7
PAINLEVEL_OUTOF10: 6
PAINLEVEL_OUTOF10: 8
PAINLEVEL_OUTOF10: 10
PAINLEVEL_OUTOF10: 10
PAINLEVEL_OUTOF10: 8
PAINLEVEL_OUTOF10: 7
PAINLEVEL_OUTOF10: 8
PAINLEVEL_OUTOF10: 7
PAINLEVEL_OUTOF10: 0
PAINLEVEL_OUTOF10: 8
PAINLEVEL_OUTOF10: 9

## 2018-08-23 ASSESSMENT — PAIN DESCRIPTION - DESCRIPTORS
DESCRIPTORS: ACHING;SPASM
DESCRIPTORS: ACHING
DESCRIPTORS: ACHING;CONSTANT
DESCRIPTORS: ACHING
DESCRIPTORS: ACHING;SPASM

## 2018-08-23 ASSESSMENT — PAIN DESCRIPTION - PAIN TYPE
TYPE: SURGICAL PAIN

## 2018-08-23 ASSESSMENT — PAIN DESCRIPTION - LOCATION
LOCATION: BACK
LOCATION: BUTTOCKS
LOCATION: BACK
LOCATION: BUTTOCKS

## 2018-08-23 ASSESSMENT — PAIN DESCRIPTION - FREQUENCY
FREQUENCY: CONTINUOUS
FREQUENCY: INTERMITTENT
FREQUENCY: CONTINUOUS
FREQUENCY: INTERMITTENT

## 2018-08-23 ASSESSMENT — PAIN DESCRIPTION - PROGRESSION
CLINICAL_PROGRESSION: NOT CHANGED
CLINICAL_PROGRESSION: GRADUALLY WORSENING
CLINICAL_PROGRESSION: GRADUALLY WORSENING
CLINICAL_PROGRESSION: NOT CHANGED
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
## 2018-08-23 NOTE — PROGRESS NOTES
initially that she can't have meds because of her BP, but then states that she just had meds.)       Orientation     Objective   Bed mobility  Rolling to Left: Supervision  Rolling to Right: Supervision  Supine to Sit: Stand by assistance  Sit to Supine: Stand by assistance  Scooting: Stand by assistance  Comment: Completed on mat table. Multiple reps of rolling completed. Transfers  Sit to Stand: Contact guard assistance (From w/c and table mat; VC to maintain L UE precautions)  Stand to sit: Contact guard assistance  Comment: Limited transfers this session 2/2 orthostatic hypotension. Ambulation  Ambulation?: Yes (ambulation limited 2/2 pt's orthostatic hypotension this date)  Ambulation 2  Surface - 2: level tile  Device 2: No device  Assistance 2: Contact guard assistance  Quality of Gait 2: Reciprocal pattern with increased YAYA, decreased chelsy, decreased reciprocal arm swing, high guard positioning. No outward LOB. Distance: 10', 20'  Comments: VC for increased reciprocal arm swing, normal step width, and increased chelsy. Comment: Pt completed 3 x 10 reps B of HS, supine hip flexion, clamshells for improved stength and endurance. 2nd session: Pt was supine in bed upon arrival. Pt agreeable to PT, but still stating that she has been feeling light headed whenever she gets up. Pt completed rolling to the L and R with supervision. Pt needing therapist to lorenzo posterior portion of TLSO, but pt was able to lorenzo anterior portion of TLSO and needed VC for location and sequencing of tightening straps of TLSO. Pt's BP in supine was 111/74 with a HR of 111. Pt completed supine to sit transfer with HOB flat without the use of side rail with supervision. Pt needing increased time to complete 2/2 pain. After 2 minutes sitting at the EOB pt's BP was 103/73 with HR of 120 and pt had no c/o feeling light headed or dizzy. Pt completed 15 reps B of LAQ and hip flexion in sitting.  Following
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
## 2018-08-23 NOTE — PROGRESS NOTES
Patient requested to use bathroom. Patient's B/P was 112/68 and pulse 103 lying. Standing her B/P was 106/63 and pulse 114. She complained of her fingers tingling. Standing her B/P was 76/49 and pulse 119. She stated that her vision was becoming blurred and her hearing was becoming muffled. Patient sat in wheelchair and was taken to bathroom and back to bed. After returning to bed, her B/P was 94/55and pulse 99. Symptoms subsided after lying in bed a few moments. Will continue to monitor.
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
## 2018-08-24 PROCEDURE — 97110 THERAPEUTIC EXERCISES: CPT

## 2018-08-24 PROCEDURE — 97116 GAIT TRAINING THERAPY: CPT

## 2018-08-24 PROCEDURE — 97535 SELF CARE MNGMENT TRAINING: CPT

## 2018-08-24 PROCEDURE — 6370000000 HC RX 637 (ALT 250 FOR IP): Performed by: PHYSICAL MEDICINE & REHABILITATION

## 2018-08-24 PROCEDURE — 97530 THERAPEUTIC ACTIVITIES: CPT

## 2018-08-24 PROCEDURE — 6370000000 HC RX 637 (ALT 250 FOR IP): Performed by: STUDENT IN AN ORGANIZED HEALTH CARE EDUCATION/TRAINING PROGRAM

## 2018-08-24 PROCEDURE — 6360000002 HC RX W HCPCS: Performed by: PHYSICAL MEDICINE & REHABILITATION

## 2018-08-24 PROCEDURE — 1280000000 HC REHAB R&B

## 2018-08-24 RX ORDER — LISINOPRIL 5 MG/1
5 TABLET ORAL DAILY
Status: DISCONTINUED | OUTPATIENT
Start: 2018-08-24 | End: 2018-08-29 | Stop reason: HOSPADM

## 2018-08-24 RX ORDER — METHOCARBAMOL 500 MG/1
1000 TABLET, FILM COATED ORAL 4 TIMES DAILY
Status: DISCONTINUED | OUTPATIENT
Start: 2018-08-24 | End: 2018-08-29 | Stop reason: HOSPADM

## 2018-08-24 RX ADMIN — DULOXETINE HYDROCHLORIDE 60 MG: 30 CAPSULE, DELAYED RELEASE ORAL at 08:44

## 2018-08-24 RX ADMIN — OXYCODONE HYDROCHLORIDE 10 MG: 5 TABLET ORAL at 11:44

## 2018-08-24 RX ADMIN — ACETAMINOPHEN 650 MG: 325 TABLET ORAL at 16:45

## 2018-08-24 RX ADMIN — METHOCARBAMOL 1000 MG: 500 TABLET ORAL at 19:45

## 2018-08-24 RX ADMIN — ACETAMINOPHEN 650 MG: 325 TABLET ORAL at 00:00

## 2018-08-24 RX ADMIN — GABAPENTIN 400 MG: 400 CAPSULE ORAL at 11:44

## 2018-08-24 RX ADMIN — METHOCARBAMOL 1000 MG: 500 TABLET ORAL at 11:44

## 2018-08-24 RX ADMIN — ACETAMINOPHEN 650 MG: 325 TABLET ORAL at 11:44

## 2018-08-24 RX ADMIN — ACETAMINOPHEN 650 MG: 325 TABLET ORAL at 06:05

## 2018-08-24 RX ADMIN — BUPROPION HYDROCHLORIDE 300 MG: 150 TABLET, EXTENDED RELEASE ORAL at 08:44

## 2018-08-24 RX ADMIN — LISINOPRIL 5 MG: 5 TABLET ORAL at 08:44

## 2018-08-24 RX ADMIN — OXYCODONE HYDROCHLORIDE 10 MG: 5 TABLET ORAL at 22:41

## 2018-08-24 RX ADMIN — NIFEDIPINE 60 MG: 60 TABLET, FILM COATED, EXTENDED RELEASE ORAL at 08:44

## 2018-08-24 RX ADMIN — OXYCODONE HYDROCHLORIDE 10 MG: 10 TABLET, FILM COATED, EXTENDED RELEASE ORAL at 08:44

## 2018-08-24 RX ADMIN — OXYCODONE HYDROCHLORIDE 10 MG: 5 TABLET ORAL at 03:11

## 2018-08-24 RX ADMIN — ENOXAPARIN SODIUM 30 MG: 30 INJECTION SUBCUTANEOUS at 08:44

## 2018-08-24 RX ADMIN — METHOCARBAMOL 750 MG: 750 TABLET ORAL at 06:05

## 2018-08-24 RX ADMIN — OXYCODONE HYDROCHLORIDE 10 MG: 5 TABLET ORAL at 07:19

## 2018-08-24 RX ADMIN — DOCUSATE SODIUM,SENNOSIDES 2 TABLET: 50; 8.6 TABLET, FILM COATED ORAL at 08:43

## 2018-08-24 RX ADMIN — MAGNESIUM HYDROXIDE 30 ML: 400 SUSPENSION ORAL at 16:45

## 2018-08-24 RX ADMIN — OXYCODONE HYDROCHLORIDE 10 MG: 10 TABLET, FILM COATED, EXTENDED RELEASE ORAL at 21:18

## 2018-08-24 RX ADMIN — GABAPENTIN 400 MG: 400 CAPSULE ORAL at 06:05

## 2018-08-24 RX ADMIN — GABAPENTIN 400 MG: 400 CAPSULE ORAL at 19:45

## 2018-08-24 RX ADMIN — METHOCARBAMOL 1000 MG: 500 TABLET ORAL at 16:45

## 2018-08-24 ASSESSMENT — PAIN DESCRIPTION - PAIN TYPE
TYPE: SURGICAL PAIN
TYPE: ACUTE PAIN;SURGICAL PAIN
TYPE: SURGICAL PAIN
TYPE: SURGICAL PAIN

## 2018-08-24 ASSESSMENT — PAIN DESCRIPTION - ORIENTATION
ORIENTATION: MID;LOWER
ORIENTATION: LOWER
ORIENTATION: MID;LOWER
ORIENTATION: LOWER
ORIENTATION: RIGHT;LOWER
ORIENTATION: LOWER
ORIENTATION: LOWER
ORIENTATION: RIGHT

## 2018-08-24 ASSESSMENT — PAIN DESCRIPTION - PROGRESSION
CLINICAL_PROGRESSION: NOT CHANGED
CLINICAL_PROGRESSION: GRADUALLY IMPROVING
CLINICAL_PROGRESSION: GRADUALLY IMPROVING
CLINICAL_PROGRESSION: NOT CHANGED

## 2018-08-24 ASSESSMENT — PAIN SCALES - GENERAL
PAINLEVEL_OUTOF10: 8
PAINLEVEL_OUTOF10: 8
PAINLEVEL_OUTOF10: 0
PAINLEVEL_OUTOF10: 10
PAINLEVEL_OUTOF10: 4
PAINLEVEL_OUTOF10: 8
PAINLEVEL_OUTOF10: 0
PAINLEVEL_OUTOF10: 3
PAINLEVEL_OUTOF10: 3
PAINLEVEL_OUTOF10: 7
PAINLEVEL_OUTOF10: 7
PAINLEVEL_OUTOF10: 4
PAINLEVEL_OUTOF10: 7
PAINLEVEL_OUTOF10: 6

## 2018-08-24 ASSESSMENT — PAIN DESCRIPTION - DESCRIPTORS
DESCRIPTORS: ACHING;SHARP
DESCRIPTORS: ACHING;SHARP
DESCRIPTORS: ACHING
DESCRIPTORS: ACHING;SPASM
DESCRIPTORS: ACHING

## 2018-08-24 ASSESSMENT — PAIN DESCRIPTION - ONSET
ONSET: ON-GOING
ONSET: AWAKENED FROM SLEEP
ONSET: ON-GOING

## 2018-08-24 ASSESSMENT — PAIN DESCRIPTION - FREQUENCY
FREQUENCY: CONTINUOUS
FREQUENCY: INTERMITTENT
FREQUENCY: CONTINUOUS
FREQUENCY: INTERMITTENT
FREQUENCY: INTERMITTENT

## 2018-08-24 ASSESSMENT — PAIN DESCRIPTION - LOCATION
LOCATION: BACK;BUTTOCKS
LOCATION: BACK;BUTTOCKS
LOCATION: COCCYX;BACK
LOCATION: BACK

## 2018-08-24 NOTE — PROGRESS NOTES
Adam June 8/24/2018  [id number]    Chief Complaint: Back pain s/p fall     Subjective:   No overnight events. No current complaints. Pain overall controlled. Main complaint is continued muscle spasms. BP remains below goal.    ROS: Denies chest pain, dyspnea, nausea, vomiting. Objective:  Patient Vitals for the past 24 hrs:   BP Temp Temp src Pulse Resp SpO2 Weight   08/24/18 0835 118/69 98.8 °F (37.1 °C) Oral 114 16 96 % -   08/24/18 0623 - - - - - - 162 lb 11.2 oz (73.8 kg)   08/23/18 2015 (!) 94/55 99 °F (37.2 °C) Oral 107 18 97 % -     Gen: No distress, pleasant. Ambulating in therapy  HEENT: Normocephalic, atraumatic. CV: Regular rate and rhythm. No MRG  Resp: No respiratory distress. CTAB  Abd: Soft, nontender   Ext: No edema. Neuro: Alert, oriented, appropriately interactive. MSK: TLSO in place, LUE in cock-up splint    Wt Readings from Last 3 Encounters:   08/24/18 162 lb 11.2 oz (73.8 kg)   06/05/18 164 lb (74.4 kg)   02/26/18 168 lb 9.6 oz (76.5 kg)       Laboratory data:   Lab Results   Component Value Date    WBC 8.2 08/23/2018    HGB 11.3 (L) 08/23/2018    HCT 33.5 (L) 08/23/2018    MCV 91.4 08/23/2018     (H) 08/23/2018       Lab Results   Component Value Date     08/23/2018    K 4.6 08/23/2018    CL 95 08/23/2018    CO2 27 08/23/2018    BUN 14 08/23/2018    CREATININE 0.7 08/23/2018    GLUCOSE 135 08/23/2018    CALCIUM 9.8 08/23/2018        Therapy progress:  PT  Position Activity Restriction  Spinal Precautions: No Bending, No Lifting, No Twisting  Other position/activity restrictions:  Up with assistance; Elbow weight bearing on the L UE; TLSO on when HOB >30*  Objective     Sit to Stand: Contact guard assistance (From w/c and table mat; VC to maintain L UE precautions)  Stand to sit: Contact guard assistance  Bed to Chair: Contact guard assistance (with and without AD)  Device: Platform Walker left  Assistance: Contact guard assistance (Progressed to SBA)  Distance: 380',

## 2018-08-24 NOTE — PLAN OF CARE
Problem: Falls - Risk of:  Goal: Will remain free from falls  Will remain free from falls   Pt remains free of falls. Pt is reminded to use call light for assistance and to not get up without help. Hourly rounding being done and fall precautions in place. Pt in bed with call light and bedside table within reach.  Bed alarm on

## 2018-08-24 NOTE — PROGRESS NOTES
Pt in bed, awake watching television. Pt complains of pain to right buttocks radiating up to right lower back. Assessment completed. Nighttime medications given when included Oxycodone extended release. Advised pt to call if no relief from pain in a hour, nurse will give her PRN pain medication. Pt repositions herself. Reminded to call for assistance with any needs. Call light within reach. Safety measures in place. Pt advised she will call when she wants a nighttime snack. For patient safety, Visual Surveillance is in place, reviewed with patient/family, verbalized understanding.

## 2018-08-24 NOTE — PLAN OF CARE
Problem: Pain:  Goal: Control of acute pain  Control of acute pain   Outcome: Ongoing  Pt complains pain to right buttocks radiating up to right lower back. Scheduled Oxycontin and prn Roxicodone given for pain. Will continue to assess pain and notify MD of increase in pain or ineffectiveness of pain medication. Pt asleep after prn dose of Oxycodone was given. Pt resting comfortably at this time. Problem: Falls - Risk of:  Goal: Will remain free from falls  Will remain free from falls   Outcome: Ongoing  Pt with history of falls. Up with assistance, TLSO brace, gait belt and walker or wheelchair. Fall precautions maintained. Fall risk armband on. Non skid footwear on. Bed in lowest position. Bed alarm in use. Reminded pt to call for assistance before getting up or any needs. Call light within reach. Pt uses call light appropriately. No falls thus far during shift. Problem: Risk for Impaired Skin Integrity  Goal: Tissue integrity - skin and mucous membranes  Structural intactness and normal physiological function of skin and  mucous membranes. Outcome: Ongoing  Pt's mucous membranes pink & moist. Scattered bruising observed. Surgical incision to mid to lower back. Incision open to air, no drainage. Incision clean, dry and intact. Will continue to assess skin integrity for breakdown. Skin care per protocol.

## 2018-08-24 NOTE — PROGRESS NOTES
dress shirt and sports bra (Min A). Call light in reach and bed alarm on. Pain Assessment  Patient Currently in Pain: Yes (pain in back, not rated, RN aware)  Vital Signs  Patient Currently in Pain: Yes (pain in back, not rated, RN aware)   Orientation   WNL  Objective    ADL  Grooming: Independent (oral hygiene, blow dry/curl/straighten hair while seated at w/c level)  UE Bathing: Supervision (TLSO don, completed while seated on shower chair. Cues to avoid wrist movement while brace doffed)  LE Bathing: Contact guard assistance (assist for thoroughness for rear lenny hygiene d/t decreased reach, would benefit from long handled sponge for increased thoroughness)  UE Dressing: Stand by assistance;Setup (min-mod assist to don TLSO)  LE Dressing: Stand by assistance (cues to thread RLE prior to LLE for increased ease, able to pull up over hips in part in stance and remainder while rolling side<>side. Socks donned while supine with BLE flexion to reach, cues to limit twisting to complete)  Additional Comments: spvn completed for item retrieval in room, patient kneeling on couch to minimize forward reach demo good understanding of ergonomic positioning. Standing Balance  Sit to stand: Stand by assistance (intermittent cues to maintain NWB L wrist)  Stand to sit: Stand by assistance  Shower Transfers  Shower - Transfer Type: To and From  Shower - Transfer To: Transfer tub bench  Shower - Technique: Ambulating (No AD )  Shower Transfers: Stand by assistance  Shower Transfers Comments: with use of grab bar for steadying, cues to maintain NWB LUE  Bed mobility  Supine to Sit: Supervision  Sit to Supine: Supervision  Transfers  Sit to stand: Stand by assistance (intermittent cues to maintain NWB L wrist)  Stand to sit: Stand by assistance  Cognition  Overall Cognitive Status: WNL  Type of ROM/Therapeutic Exercise  Comment: completed with minimal resistance theraband (yellow) secured around distal humerus.  Completed h Therapy Time:   Individual Concurrent Group Co-treatment   Time In 1015         Time Out 1045         Minutes 30           Timed Code Treatment Minutes:  60+30    Total Treatment Minutes:  90 min    RAFAEL BUSBY [facility], OTR/L #[id number]
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
## 2018-08-25 PROCEDURE — 6370000000 HC RX 637 (ALT 250 FOR IP): Performed by: PHYSICAL MEDICINE & REHABILITATION

## 2018-08-25 PROCEDURE — 97530 THERAPEUTIC ACTIVITIES: CPT

## 2018-08-25 PROCEDURE — 97110 THERAPEUTIC EXERCISES: CPT | Performed by: PHYSICAL THERAPIST

## 2018-08-25 PROCEDURE — 97110 THERAPEUTIC EXERCISES: CPT

## 2018-08-25 PROCEDURE — 97535 SELF CARE MNGMENT TRAINING: CPT

## 2018-08-25 PROCEDURE — 97116 GAIT TRAINING THERAPY: CPT | Performed by: PHYSICAL THERAPIST

## 2018-08-25 PROCEDURE — 1280000000 HC REHAB R&B

## 2018-08-25 PROCEDURE — 6370000000 HC RX 637 (ALT 250 FOR IP): Performed by: STUDENT IN AN ORGANIZED HEALTH CARE EDUCATION/TRAINING PROGRAM

## 2018-08-25 PROCEDURE — 97530 THERAPEUTIC ACTIVITIES: CPT | Performed by: PHYSICAL THERAPIST

## 2018-08-25 RX ADMIN — ACETAMINOPHEN 650 MG: 325 TABLET ORAL at 12:27

## 2018-08-25 RX ADMIN — OXYCODONE HYDROCHLORIDE 10 MG: 5 TABLET ORAL at 17:10

## 2018-08-25 RX ADMIN — METHOCARBAMOL 1000 MG: 500 TABLET ORAL at 06:52

## 2018-08-25 RX ADMIN — OXYCODONE HYDROCHLORIDE 10 MG: 10 TABLET, FILM COATED, EXTENDED RELEASE ORAL at 08:13

## 2018-08-25 RX ADMIN — OXYCODONE HYDROCHLORIDE 10 MG: 5 TABLET ORAL at 12:27

## 2018-08-25 RX ADMIN — DULOXETINE HYDROCHLORIDE 60 MG: 30 CAPSULE, DELAYED RELEASE ORAL at 08:13

## 2018-08-25 RX ADMIN — ACETAMINOPHEN 650 MG: 325 TABLET ORAL at 06:52

## 2018-08-25 RX ADMIN — OXYCODONE HYDROCHLORIDE 10 MG: 5 TABLET ORAL at 03:52

## 2018-08-25 RX ADMIN — ACETAMINOPHEN 650 MG: 325 TABLET ORAL at 17:10

## 2018-08-25 RX ADMIN — NIFEDIPINE 60 MG: 60 TABLET, FILM COATED, EXTENDED RELEASE ORAL at 08:13

## 2018-08-25 RX ADMIN — DOCUSATE SODIUM,SENNOSIDES 2 TABLET: 50; 8.6 TABLET, FILM COATED ORAL at 08:13

## 2018-08-25 RX ADMIN — LISINOPRIL 5 MG: 5 TABLET ORAL at 08:13

## 2018-08-25 RX ADMIN — GABAPENTIN 400 MG: 400 CAPSULE ORAL at 12:28

## 2018-08-25 RX ADMIN — METHOCARBAMOL 1000 MG: 500 TABLET ORAL at 12:27

## 2018-08-25 RX ADMIN — ACETAMINOPHEN 650 MG: 325 TABLET ORAL at 23:13

## 2018-08-25 RX ADMIN — OXYCODONE HYDROCHLORIDE 10 MG: 10 TABLET, FILM COATED, EXTENDED RELEASE ORAL at 20:50

## 2018-08-25 RX ADMIN — OXYCODONE HYDROCHLORIDE 5 MG: 5 TABLET ORAL at 08:14

## 2018-08-25 RX ADMIN — OXYCODONE HYDROCHLORIDE 10 MG: 5 TABLET ORAL at 21:48

## 2018-08-25 RX ADMIN — DOCUSATE SODIUM,SENNOSIDES 2 TABLET: 50; 8.6 TABLET, FILM COATED ORAL at 20:50

## 2018-08-25 RX ADMIN — BUPROPION HYDROCHLORIDE 300 MG: 150 TABLET, EXTENDED RELEASE ORAL at 08:13

## 2018-08-25 RX ADMIN — GABAPENTIN 400 MG: 400 CAPSULE ORAL at 06:52

## 2018-08-25 RX ADMIN — METHOCARBAMOL 1000 MG: 500 TABLET ORAL at 20:03

## 2018-08-25 RX ADMIN — METHOCARBAMOL 1000 MG: 500 TABLET ORAL at 16:24

## 2018-08-25 RX ADMIN — GABAPENTIN 400 MG: 400 CAPSULE ORAL at 20:03

## 2018-08-25 ASSESSMENT — PAIN DESCRIPTION - PROGRESSION
CLINICAL_PROGRESSION: NOT CHANGED
CLINICAL_PROGRESSION: RAPIDLY WORSENING
CLINICAL_PROGRESSION: GRADUALLY IMPROVING
CLINICAL_PROGRESSION: NOT CHANGED
CLINICAL_PROGRESSION: RAPIDLY WORSENING
CLINICAL_PROGRESSION: RAPIDLY WORSENING
CLINICAL_PROGRESSION: NOT CHANGED
CLINICAL_PROGRESSION: GRADUALLY WORSENING

## 2018-08-25 ASSESSMENT — PAIN DESCRIPTION - FREQUENCY
FREQUENCY: INTERMITTENT
FREQUENCY: CONTINUOUS

## 2018-08-25 ASSESSMENT — PAIN DESCRIPTION - ONSET
ONSET: ON-GOING
ONSET: PROGRESSIVE
ONSET: ON-GOING
ONSET: AWAKENED FROM SLEEP
ONSET: AWAKENED FROM SLEEP

## 2018-08-25 ASSESSMENT — PAIN SCALES - GENERAL
PAINLEVEL_OUTOF10: 3
PAINLEVEL_OUTOF10: 7
PAINLEVEL_OUTOF10: 7
PAINLEVEL_OUTOF10: 8
PAINLEVEL_OUTOF10: 0
PAINLEVEL_OUTOF10: 8
PAINLEVEL_OUTOF10: 6
PAINLEVEL_OUTOF10: 8
PAINLEVEL_OUTOF10: 7
PAINLEVEL_OUTOF10: 3
PAINLEVEL_OUTOF10: 3
PAINLEVEL_OUTOF10: 6
PAINLEVEL_OUTOF10: 10
PAINLEVEL_OUTOF10: 9
PAINLEVEL_OUTOF10: 3
PAINLEVEL_OUTOF10: 8

## 2018-08-25 ASSESSMENT — PAIN DESCRIPTION - LOCATION
LOCATION: BACK;COCCYX
LOCATION: BACK
LOCATION: COCCYX;BACK
LOCATION: BACK
LOCATION: BACK;COCCYX
LOCATION: BACK
LOCATION: BACK

## 2018-08-25 ASSESSMENT — PAIN DESCRIPTION - PAIN TYPE
TYPE: ACUTE PAIN
TYPE: ACUTE PAIN;SURGICAL PAIN
TYPE: ACUTE PAIN

## 2018-08-25 ASSESSMENT — PAIN DESCRIPTION - ORIENTATION
ORIENTATION: RIGHT;LOWER
ORIENTATION: LEFT;RIGHT;LOWER
ORIENTATION: LOWER;RIGHT
ORIENTATION: LOWER
ORIENTATION: LOWER;RIGHT
ORIENTATION: LOWER;RIGHT
ORIENTATION: MID;LOWER
ORIENTATION: LEFT;RIGHT;LOWER
ORIENTATION: LOWER;RIGHT

## 2018-08-25 ASSESSMENT — PAIN DESCRIPTION - DESCRIPTORS
DESCRIPTORS: ACHING;SORE
DESCRIPTORS: SPASM;SHOOTING;SHARP
DESCRIPTORS: SPASM;SHARP;SHOOTING
DESCRIPTORS: SPASM;SHOOTING;SHARP
DESCRIPTORS: SPASM;SHARP;SHOOTING
DESCRIPTORS: SPASM;SHARP;SHOOTING
DESCRIPTORS: SHARP;SHOOTING;SPASM
DESCRIPTORS: SPASM;SHARP;SHOOTING

## 2018-08-25 ASSESSMENT — ACTIVITIES OF DAILY LIVING (ADL)
EFFECT OF PAIN ON DAILY ACTIVITIES: COMFORT

## 2018-08-25 ASSESSMENT — PAIN DESCRIPTION - DIRECTION: RADIATING_TOWARDS: R LOWER LEG

## 2018-08-25 NOTE — PLAN OF CARE
Problem: Falls - Risk of:  Goal: Will remain free from falls  Will remain free from falls   Outcome: Ongoing  Remains free from falls and accidental injury. Assessed as high fall risk, appropriate precautions in place, utilizing call light appropriately for needs. Will monitor. Problem: Risk for Impaired Skin Integrity  Goal: Tissue integrity - skin and mucous membranes  Structural intactness and normal physiological function of skin and  mucous membranes. Outcome: Ongoing  No new skin breakdown noted during this shift. Pt singh assessed q shift, will continue to monitor skin checks q4 and prn and check bony prominences for breakdown.  Pt turns self, continent of bowel and bladder, pt on special mattress, heels elevated off mattress, skin remains clean and dry, will continue to monitor
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
## 2018-08-26 PROCEDURE — 94760 N-INVAS EAR/PLS OXIMETRY 1: CPT

## 2018-08-26 PROCEDURE — 6370000000 HC RX 637 (ALT 250 FOR IP): Performed by: STUDENT IN AN ORGANIZED HEALTH CARE EDUCATION/TRAINING PROGRAM

## 2018-08-26 PROCEDURE — 6370000000 HC RX 637 (ALT 250 FOR IP): Performed by: PHYSICAL MEDICINE & REHABILITATION

## 2018-08-26 PROCEDURE — 1280000000 HC REHAB R&B

## 2018-08-26 RX ADMIN — GABAPENTIN 400 MG: 400 CAPSULE ORAL at 06:08

## 2018-08-26 RX ADMIN — GABAPENTIN 400 MG: 400 CAPSULE ORAL at 19:57

## 2018-08-26 RX ADMIN — DULOXETINE HYDROCHLORIDE 60 MG: 30 CAPSULE, DELAYED RELEASE ORAL at 08:52

## 2018-08-26 RX ADMIN — Medication 3 MG: at 21:30

## 2018-08-26 RX ADMIN — NIFEDIPINE 60 MG: 60 TABLET, FILM COATED, EXTENDED RELEASE ORAL at 08:51

## 2018-08-26 RX ADMIN — OXYCODONE HYDROCHLORIDE 10 MG: 10 TABLET, FILM COATED, EXTENDED RELEASE ORAL at 08:52

## 2018-08-26 RX ADMIN — METHOCARBAMOL 1000 MG: 500 TABLET ORAL at 15:35

## 2018-08-26 RX ADMIN — OXYCODONE HYDROCHLORIDE 10 MG: 5 TABLET ORAL at 02:02

## 2018-08-26 RX ADMIN — ACETAMINOPHEN 650 MG: 325 TABLET ORAL at 06:08

## 2018-08-26 RX ADMIN — OXYCODONE HYDROCHLORIDE 10 MG: 5 TABLET ORAL at 07:04

## 2018-08-26 RX ADMIN — BUPROPION HYDROCHLORIDE 300 MG: 150 TABLET, EXTENDED RELEASE ORAL at 08:52

## 2018-08-26 RX ADMIN — ACETAMINOPHEN 650 MG: 325 TABLET ORAL at 11:33

## 2018-08-26 RX ADMIN — DOCUSATE SODIUM,SENNOSIDES 2 TABLET: 50; 8.6 TABLET, FILM COATED ORAL at 19:58

## 2018-08-26 RX ADMIN — OXYCODONE HYDROCHLORIDE 10 MG: 5 TABLET ORAL at 21:30

## 2018-08-26 RX ADMIN — OXYCODONE HYDROCHLORIDE 10 MG: 5 TABLET ORAL at 11:33

## 2018-08-26 RX ADMIN — ACETAMINOPHEN 650 MG: 325 TABLET ORAL at 18:02

## 2018-08-26 RX ADMIN — DOCUSATE SODIUM,SENNOSIDES 2 TABLET: 50; 8.6 TABLET, FILM COATED ORAL at 08:52

## 2018-08-26 RX ADMIN — OXYCODONE HYDROCHLORIDE 10 MG: 10 TABLET, FILM COATED, EXTENDED RELEASE ORAL at 20:01

## 2018-08-26 RX ADMIN — LISINOPRIL 5 MG: 5 TABLET ORAL at 08:52

## 2018-08-26 RX ADMIN — METHOCARBAMOL 1000 MG: 500 TABLET ORAL at 19:57

## 2018-08-26 RX ADMIN — METHOCARBAMOL 1000 MG: 500 TABLET ORAL at 06:08

## 2018-08-26 RX ADMIN — OXYCODONE HYDROCHLORIDE 10 MG: 5 TABLET ORAL at 15:38

## 2018-08-26 RX ADMIN — GABAPENTIN 400 MG: 400 CAPSULE ORAL at 12:55

## 2018-08-26 RX ADMIN — METHOCARBAMOL 1000 MG: 500 TABLET ORAL at 11:33

## 2018-08-26 ASSESSMENT — PAIN DESCRIPTION - PAIN TYPE
TYPE: ACUTE PAIN

## 2018-08-26 ASSESSMENT — PAIN DESCRIPTION - ORIENTATION
ORIENTATION: LOWER;RIGHT;LEFT
ORIENTATION: LOWER;LEFT;RIGHT
ORIENTATION: RIGHT;LEFT
ORIENTATION: RIGHT;LEFT
ORIENTATION: RIGHT;LEFT;LOWER
ORIENTATION: RIGHT;LEFT
ORIENTATION: RIGHT;LEFT

## 2018-08-26 ASSESSMENT — ACTIVITIES OF DAILY LIVING (ADL)
EFFECT OF PAIN ON DAILY ACTIVITIES: COMFORT

## 2018-08-26 ASSESSMENT — PAIN SCALES - GENERAL
PAINLEVEL_OUTOF10: 6
PAINLEVEL_OUTOF10: 4
PAINLEVEL_OUTOF10: 5
PAINLEVEL_OUTOF10: 5
PAINLEVEL_OUTOF10: 10
PAINLEVEL_OUTOF10: 3
PAINLEVEL_OUTOF10: 8
PAINLEVEL_OUTOF10: 7
PAINLEVEL_OUTOF10: 8
PAINLEVEL_OUTOF10: 4
PAINLEVEL_OUTOF10: 6
PAINLEVEL_OUTOF10: 5
PAINLEVEL_OUTOF10: 4
PAINLEVEL_OUTOF10: 7
PAINLEVEL_OUTOF10: 7
PAINLEVEL_OUTOF10: 6
PAINLEVEL_OUTOF10: 0
PAINLEVEL_OUTOF10: 8

## 2018-08-26 ASSESSMENT — PAIN DESCRIPTION - ONSET
ONSET: PROGRESSIVE
ONSET: PROGRESSIVE
ONSET: ON-GOING
ONSET: PROGRESSIVE

## 2018-08-26 ASSESSMENT — PAIN DESCRIPTION - PROGRESSION
CLINICAL_PROGRESSION: GRADUALLY WORSENING
CLINICAL_PROGRESSION: GRADUALLY IMPROVING
CLINICAL_PROGRESSION: GRADUALLY WORSENING
CLINICAL_PROGRESSION: GRADUALLY IMPROVING
CLINICAL_PROGRESSION: GRADUALLY IMPROVING
CLINICAL_PROGRESSION: GRADUALLY WORSENING

## 2018-08-26 ASSESSMENT — PAIN DESCRIPTION - DESCRIPTORS
DESCRIPTORS: SHARP;SHOOTING
DESCRIPTORS: SHARP
DESCRIPTORS: SPASM;SHARP;SHOOTING
DESCRIPTORS: SHARP;SHOOTING;SORE;SPASM
DESCRIPTORS: SHARP;SHOOTING;SPASM
DESCRIPTORS: SHARP
DESCRIPTORS: SHARP;ACHING;SHOOTING

## 2018-08-26 ASSESSMENT — PAIN DESCRIPTION - LOCATION
LOCATION: BACK

## 2018-08-26 ASSESSMENT — PAIN DESCRIPTION - FREQUENCY
FREQUENCY: CONTINUOUS

## 2018-08-26 ASSESSMENT — PAIN DESCRIPTION - DIRECTION
RADIATING_TOWARDS: R LOWER LEG
RADIATING_TOWARDS: R LOWER LEG
RADIATING_TOWARDS: RLE

## 2018-08-26 NOTE — PROGRESS NOTES
Assessment completed, medications given, fall precautions remain in place. Patient is able to teach back to call for assistance to transfer.

## 2018-08-26 NOTE — PROGRESS NOTES
Patient resting in bed, assisted to bathroom x1 GB and walker. Tolerating well. Pt requires assistance with applying TLSO brace. States pain is 8/10 to mid lower back and is shooting down R Leg. Scheduled Oxy given. Pt denies other needs a this time. Fall precautions in place.

## 2018-08-27 LAB
ANION GAP SERPL CALCULATED.3IONS-SCNC: 10 MMOL/L (ref 3–16)
BUN BLDV-MCNC: 12 MG/DL (ref 7–20)
CALCIUM SERPL-MCNC: 9.9 MG/DL (ref 8.3–10.6)
CHLORIDE BLD-SCNC: 101 MMOL/L (ref 99–110)
CO2: 29 MMOL/L (ref 21–32)
CREAT SERPL-MCNC: 0.7 MG/DL (ref 0.6–1.1)
GFR AFRICAN AMERICAN: >60
GFR NON-AFRICAN AMERICAN: >60
GLUCOSE BLD-MCNC: 124 MG/DL (ref 70–99)
HCT VFR BLD CALC: 32.8 % (ref 36–48)
HEMOGLOBIN: 10.7 G/DL (ref 12–16)
MCH RBC QN AUTO: 30.4 PG (ref 26–34)
MCHC RBC AUTO-ENTMCNC: 32.7 G/DL (ref 31–36)
MCV RBC AUTO: 92.9 FL (ref 80–100)
PDW BLD-RTO: 14.4 % (ref 12.4–15.4)
PLATELET # BLD: 610 K/UL (ref 135–450)
PMV BLD AUTO: 7.7 FL (ref 5–10.5)
POTASSIUM SERPL-SCNC: 4.8 MMOL/L (ref 3.5–5.1)
RBC # BLD: 3.53 M/UL (ref 4–5.2)
SODIUM BLD-SCNC: 140 MMOL/L (ref 136–145)
WBC # BLD: 5.1 K/UL (ref 4–11)

## 2018-08-27 PROCEDURE — 6370000000 HC RX 637 (ALT 250 FOR IP): Performed by: PHYSICAL MEDICINE & REHABILITATION

## 2018-08-27 PROCEDURE — 36415 COLL VENOUS BLD VENIPUNCTURE: CPT

## 2018-08-27 PROCEDURE — 6370000000 HC RX 637 (ALT 250 FOR IP): Performed by: STUDENT IN AN ORGANIZED HEALTH CARE EDUCATION/TRAINING PROGRAM

## 2018-08-27 PROCEDURE — 97535 SELF CARE MNGMENT TRAINING: CPT

## 2018-08-27 PROCEDURE — 97530 THERAPEUTIC ACTIVITIES: CPT

## 2018-08-27 PROCEDURE — 85027 COMPLETE CBC AUTOMATED: CPT

## 2018-08-27 PROCEDURE — 1280000000 HC REHAB R&B

## 2018-08-27 PROCEDURE — 97112 NEUROMUSCULAR REEDUCATION: CPT

## 2018-08-27 PROCEDURE — 97116 GAIT TRAINING THERAPY: CPT

## 2018-08-27 PROCEDURE — 80048 BASIC METABOLIC PNL TOTAL CA: CPT

## 2018-08-27 RX ORDER — NIFEDIPINE 30 MG/1
30 TABLET, FILM COATED, EXTENDED RELEASE ORAL DAILY
Status: DISCONTINUED | OUTPATIENT
Start: 2018-08-28 | End: 2018-08-28

## 2018-08-27 RX ADMIN — DULOXETINE HYDROCHLORIDE 60 MG: 30 CAPSULE, DELAYED RELEASE ORAL at 08:00

## 2018-08-27 RX ADMIN — ACETAMINOPHEN 650 MG: 325 TABLET ORAL at 12:08

## 2018-08-27 RX ADMIN — ACETAMINOPHEN 650 MG: 325 TABLET ORAL at 00:21

## 2018-08-27 RX ADMIN — LISINOPRIL 5 MG: 5 TABLET ORAL at 07:59

## 2018-08-27 RX ADMIN — METHOCARBAMOL 1000 MG: 500 TABLET ORAL at 16:15

## 2018-08-27 RX ADMIN — OXYCODONE HYDROCHLORIDE 10 MG: 5 TABLET ORAL at 12:11

## 2018-08-27 RX ADMIN — METHOCARBAMOL 1000 MG: 500 TABLET ORAL at 06:22

## 2018-08-27 RX ADMIN — ACETAMINOPHEN 650 MG: 325 TABLET ORAL at 23:56

## 2018-08-27 RX ADMIN — NIFEDIPINE 60 MG: 60 TABLET, FILM COATED, EXTENDED RELEASE ORAL at 07:59

## 2018-08-27 RX ADMIN — ACETAMINOPHEN 650 MG: 325 TABLET ORAL at 06:23

## 2018-08-27 RX ADMIN — METHOCARBAMOL 1000 MG: 500 TABLET ORAL at 19:39

## 2018-08-27 RX ADMIN — DOCUSATE SODIUM,SENNOSIDES 2 TABLET: 50; 8.6 TABLET, FILM COATED ORAL at 07:59

## 2018-08-27 RX ADMIN — GABAPENTIN 400 MG: 400 CAPSULE ORAL at 14:51

## 2018-08-27 RX ADMIN — DOCUSATE SODIUM,SENNOSIDES 2 TABLET: 50; 8.6 TABLET, FILM COATED ORAL at 19:39

## 2018-08-27 RX ADMIN — GABAPENTIN 400 MG: 400 CAPSULE ORAL at 19:39

## 2018-08-27 RX ADMIN — ACETAMINOPHEN 650 MG: 325 TABLET ORAL at 18:11

## 2018-08-27 RX ADMIN — BUPROPION HYDROCHLORIDE 300 MG: 150 TABLET, EXTENDED RELEASE ORAL at 07:59

## 2018-08-27 RX ADMIN — OXYCODONE HYDROCHLORIDE 10 MG: 10 TABLET, FILM COATED, EXTENDED RELEASE ORAL at 20:47

## 2018-08-27 RX ADMIN — OXYCODONE HYDROCHLORIDE 10 MG: 5 TABLET ORAL at 16:16

## 2018-08-27 RX ADMIN — OXYCODONE HYDROCHLORIDE 10 MG: 5 TABLET ORAL at 05:02

## 2018-08-27 RX ADMIN — METHOCARBAMOL 1000 MG: 500 TABLET ORAL at 12:08

## 2018-08-27 RX ADMIN — OXYCODONE HYDROCHLORIDE 10 MG: 10 TABLET, FILM COATED, EXTENDED RELEASE ORAL at 07:59

## 2018-08-27 RX ADMIN — GABAPENTIN 400 MG: 400 CAPSULE ORAL at 06:22

## 2018-08-27 ASSESSMENT — PAIN SCALES - GENERAL
PAINLEVEL_OUTOF10: 9
PAINLEVEL_OUTOF10: 5
PAINLEVEL_OUTOF10: 9
PAINLEVEL_OUTOF10: 6
PAINLEVEL_OUTOF10: 2
PAINLEVEL_OUTOF10: 8
PAINLEVEL_OUTOF10: 9
PAINLEVEL_OUTOF10: 7
PAINLEVEL_OUTOF10: 4
PAINLEVEL_OUTOF10: 0
PAINLEVEL_OUTOF10: 6
PAINLEVEL_OUTOF10: 7
PAINLEVEL_OUTOF10: 6
PAINLEVEL_OUTOF10: 0
PAINLEVEL_OUTOF10: 5
PAINLEVEL_OUTOF10: 0
PAINLEVEL_OUTOF10: 2
PAINLEVEL_OUTOF10: 4

## 2018-08-27 ASSESSMENT — PAIN DESCRIPTION - DIRECTION
RADIATING_TOWARDS: R HIP

## 2018-08-27 ASSESSMENT — PAIN DESCRIPTION - PAIN TYPE
TYPE: ACUTE PAIN

## 2018-08-27 ASSESSMENT — PAIN DESCRIPTION - DESCRIPTORS
DESCRIPTORS: SHARP;ACHING
DESCRIPTORS: SHARP;SHOOTING

## 2018-08-27 ASSESSMENT — PAIN DESCRIPTION - ORIENTATION
ORIENTATION: RIGHT;LEFT

## 2018-08-27 ASSESSMENT — PAIN DESCRIPTION - FREQUENCY
FREQUENCY: CONTINUOUS

## 2018-08-27 ASSESSMENT — PAIN DESCRIPTION - PROGRESSION
CLINICAL_PROGRESSION: GRADUALLY WORSENING

## 2018-08-27 ASSESSMENT — PAIN DESCRIPTION - LOCATION
LOCATION: BACK

## 2018-08-27 ASSESSMENT — ACTIVITIES OF DAILY LIVING (ADL): EFFECT OF PAIN ON DAILY ACTIVITIES: COMFORT

## 2018-08-27 ASSESSMENT — PAIN DESCRIPTION - ONSET
ONSET: ON-GOING
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
## 2018-08-28 PROCEDURE — 97530 THERAPEUTIC ACTIVITIES: CPT

## 2018-08-28 PROCEDURE — 6370000000 HC RX 637 (ALT 250 FOR IP): Performed by: PHYSICAL MEDICINE & REHABILITATION

## 2018-08-28 PROCEDURE — 97116 GAIT TRAINING THERAPY: CPT

## 2018-08-28 PROCEDURE — 97535 SELF CARE MNGMENT TRAINING: CPT

## 2018-08-28 PROCEDURE — 6370000000 HC RX 637 (ALT 250 FOR IP): Performed by: STUDENT IN AN ORGANIZED HEALTH CARE EDUCATION/TRAINING PROGRAM

## 2018-08-28 PROCEDURE — 1280000000 HC REHAB R&B

## 2018-08-28 PROCEDURE — 97110 THERAPEUTIC EXERCISES: CPT

## 2018-08-28 PROCEDURE — 97112 NEUROMUSCULAR REEDUCATION: CPT

## 2018-08-28 RX ORDER — GABAPENTIN 400 MG/1
400 CAPSULE ORAL 3 TIMES DAILY
Qty: 90 CAPSULE | Refills: 3 | Status: SHIPPED | OUTPATIENT
Start: 2018-08-28 | End: 2021-10-19 | Stop reason: ALTCHOICE

## 2018-08-28 RX ORDER — NIFEDIPINE 60 MG/1
TABLET, FILM COATED, EXTENDED RELEASE ORAL
Qty: 60 TABLET | Refills: 3 | Status: SHIPPED | OUTPATIENT
Start: 2018-08-28 | End: 2019-06-05

## 2018-08-28 RX ORDER — OXYCODONE HYDROCHLORIDE 5 MG/1
5 TABLET ORAL
Status: DISCONTINUED | OUTPATIENT
Start: 2018-08-28 | End: 2018-08-29 | Stop reason: HOSPADM

## 2018-08-28 RX ORDER — METHOCARBAMOL 750 MG/1
750 TABLET, FILM COATED ORAL 4 TIMES DAILY
Qty: 120 TABLET | Refills: 0 | Status: SHIPPED | OUTPATIENT
Start: 2018-08-28 | End: 2019-06-05

## 2018-08-28 RX ORDER — OXYCODONE HYDROCHLORIDE 5 MG/1
5 TABLET ORAL
Qty: 56 TABLET | Refills: 0 | Status: SHIPPED | OUTPATIENT
Start: 2018-08-28 | End: 2018-09-04

## 2018-08-28 RX ORDER — NIFEDIPINE 60 MG/1
60 TABLET, EXTENDED RELEASE ORAL DAILY
Status: DISCONTINUED | OUTPATIENT
Start: 2018-08-29 | End: 2018-08-29 | Stop reason: HOSPADM

## 2018-08-28 RX ORDER — DULOXETIN HYDROCHLORIDE 60 MG/1
60 CAPSULE, DELAYED RELEASE ORAL DAILY
Qty: 30 CAPSULE | Refills: 3 | Status: SHIPPED | OUTPATIENT
Start: 2018-08-29 | End: 2019-07-17 | Stop reason: SDUPTHER

## 2018-08-28 RX ORDER — OXYCODONE HCL 10 MG/1
10 TABLET, FILM COATED, EXTENDED RELEASE ORAL EVERY 12 HOURS SCHEDULED
Qty: 14 TABLET | Refills: 0 | Status: SHIPPED | OUTPATIENT
Start: 2018-08-28 | End: 2018-09-04

## 2018-08-28 RX ORDER — OXYCODONE HYDROCHLORIDE 5 MG/1
10 TABLET ORAL
Status: DISCONTINUED | OUTPATIENT
Start: 2018-08-28 | End: 2018-08-29 | Stop reason: HOSPADM

## 2018-08-28 RX ADMIN — ACETAMINOPHEN 650 MG: 325 TABLET ORAL at 12:16

## 2018-08-28 RX ADMIN — METHOCARBAMOL 1000 MG: 500 TABLET ORAL at 05:45

## 2018-08-28 RX ADMIN — GABAPENTIN 400 MG: 400 CAPSULE ORAL at 05:45

## 2018-08-28 RX ADMIN — ACETAMINOPHEN 650 MG: 325 TABLET ORAL at 18:00

## 2018-08-28 RX ADMIN — ACETAMINOPHEN 650 MG: 325 TABLET ORAL at 23:35

## 2018-08-28 RX ADMIN — LISINOPRIL 5 MG: 5 TABLET ORAL at 07:46

## 2018-08-28 RX ADMIN — OXYCODONE HYDROCHLORIDE 10 MG: 5 TABLET ORAL at 09:41

## 2018-08-28 RX ADMIN — OXYCODONE HYDROCHLORIDE 10 MG: 10 TABLET, FILM COATED, EXTENDED RELEASE ORAL at 08:26

## 2018-08-28 RX ADMIN — OXYCODONE HYDROCHLORIDE 10 MG: 5 TABLET ORAL at 00:42

## 2018-08-28 RX ADMIN — OXYCODONE HYDROCHLORIDE 10 MG: 5 TABLET ORAL at 05:45

## 2018-08-28 RX ADMIN — ACETAMINOPHEN 650 MG: 325 TABLET ORAL at 05:45

## 2018-08-28 RX ADMIN — DOCUSATE SODIUM,SENNOSIDES 2 TABLET: 50; 8.6 TABLET, FILM COATED ORAL at 20:08

## 2018-08-28 RX ADMIN — METHOCARBAMOL 1000 MG: 500 TABLET ORAL at 12:16

## 2018-08-28 RX ADMIN — NIFEDIPINE 30 MG: 30 TABLET, FILM COATED, EXTENDED RELEASE ORAL at 07:47

## 2018-08-28 RX ADMIN — DOCUSATE SODIUM,SENNOSIDES 2 TABLET: 50; 8.6 TABLET, FILM COATED ORAL at 07:46

## 2018-08-28 RX ADMIN — GABAPENTIN 400 MG: 400 CAPSULE ORAL at 20:08

## 2018-08-28 RX ADMIN — OXYCODONE HYDROCHLORIDE 10 MG: 10 TABLET, FILM COATED, EXTENDED RELEASE ORAL at 21:03

## 2018-08-28 RX ADMIN — OXYCODONE HYDROCHLORIDE 10 MG: 5 TABLET ORAL at 23:35

## 2018-08-28 RX ADMIN — OXYCODONE HYDROCHLORIDE 10 MG: 5 TABLET ORAL at 14:59

## 2018-08-28 RX ADMIN — METHOCARBAMOL 1000 MG: 500 TABLET ORAL at 20:08

## 2018-08-28 RX ADMIN — BUPROPION HYDROCHLORIDE 300 MG: 150 TABLET, EXTENDED RELEASE ORAL at 07:46

## 2018-08-28 RX ADMIN — GABAPENTIN 400 MG: 400 CAPSULE ORAL at 14:06

## 2018-08-28 RX ADMIN — METHOCARBAMOL 1000 MG: 500 TABLET ORAL at 15:56

## 2018-08-28 RX ADMIN — DULOXETINE HYDROCHLORIDE 60 MG: 30 CAPSULE, DELAYED RELEASE ORAL at 07:46

## 2018-08-28 ASSESSMENT — PAIN DESCRIPTION - ORIENTATION
ORIENTATION: RIGHT
ORIENTATION: LOWER;MID
ORIENTATION: RIGHT
ORIENTATION: RIGHT;LEFT
ORIENTATION: RIGHT
ORIENTATION: RIGHT
ORIENTATION: LOWER;MID
ORIENTATION: RIGHT;LEFT
ORIENTATION: RIGHT
ORIENTATION: LOWER;MID

## 2018-08-28 ASSESSMENT — PAIN SCALES - GENERAL
PAINLEVEL_OUTOF10: 2
PAINLEVEL_OUTOF10: 5
PAINLEVEL_OUTOF10: 7
PAINLEVEL_OUTOF10: 8
PAINLEVEL_OUTOF10: 7
PAINLEVEL_OUTOF10: 5
PAINLEVEL_OUTOF10: 7
PAINLEVEL_OUTOF10: 7
PAINLEVEL_OUTOF10: 6
PAINLEVEL_OUTOF10: 5
PAINLEVEL_OUTOF10: 8
PAINLEVEL_OUTOF10: 6
PAINLEVEL_OUTOF10: 7
PAINLEVEL_OUTOF10: 7
PAINLEVEL_OUTOF10: 8
PAINLEVEL_OUTOF10: 7
PAINLEVEL_OUTOF10: 6
PAINLEVEL_OUTOF10: 6

## 2018-08-28 ASSESSMENT — PAIN DESCRIPTION - LOCATION
LOCATION: HIP
LOCATION: HIP
LOCATION: BACK
LOCATION: HIP
LOCATION: HIP
LOCATION: BACK
LOCATION: HIP
LOCATION: BACK

## 2018-08-28 ASSESSMENT — PAIN DESCRIPTION - DESCRIPTORS
DESCRIPTORS: SPASM
DESCRIPTORS: SPASM
DESCRIPTORS: SHARP
DESCRIPTORS: SHARP;SHOOTING
DESCRIPTORS: SPASM
DESCRIPTORS: SHARP;SHOOTING
DESCRIPTORS: SPASM;SQUEEZING
DESCRIPTORS: SHARP
DESCRIPTORS: SHARP;SHOOTING
DESCRIPTORS: SHARP;SHOOTING

## 2018-08-28 ASSESSMENT — PAIN DESCRIPTION - ONSET
ONSET: ON-GOING

## 2018-08-28 ASSESSMENT — PAIN DESCRIPTION - PAIN TYPE
TYPE: ACUTE PAIN

## 2018-08-28 ASSESSMENT — ACTIVITIES OF DAILY LIVING (ADL): EFFECT OF PAIN ON DAILY ACTIVITIES: COMFORT

## 2018-08-28 ASSESSMENT — PAIN DESCRIPTION - FREQUENCY
FREQUENCY: INTERMITTENT
FREQUENCY: CONTINUOUS
FREQUENCY: INTERMITTENT
FREQUENCY: INTERMITTENT
FREQUENCY: CONTINUOUS
FREQUENCY: INTERMITTENT

## 2018-08-28 ASSESSMENT — PAIN DESCRIPTION - PROGRESSION
CLINICAL_PROGRESSION: GRADUALLY WORSENING

## 2018-08-28 ASSESSMENT — PAIN DESCRIPTION - DIRECTION
RADIATING_TOWARDS: RIGHT HIP

## 2018-08-28 NOTE — PROGRESS NOTES
Vin Donnelly  8/28/2018  [id number]    Chief Complaint: Back pain s/p fall     Subjective:   No new complaints this morning. No issues overnight. Denies pain. Planning for d/c tomorrow. ROS: Denies chest pain, dyspnea, nausea, vomiting. Objective:  Patient Vitals for the past 24 hrs:   BP Temp Temp src Pulse Resp SpO2 Weight   08/28/18 0745 110/69 98.1 °F (36.7 °C) Oral 93 16 96 % -   08/28/18 0700 - - - - - - 165 lb 2 oz (74.9 kg)   08/27/18 1937 (!) 194/66 98.2 °F (36.8 °C) Oral 101 16 95 % -     Gen: No distress, pleasant. Ambulating in therapy  HEENT: Normocephalic, atraumatic. CV: Regular rate and rhythm. No MRG  Resp: No respiratory distress. CTAB  Abd: Soft, nontender   Ext: No edema. Neuro: Alert, oriented, appropriately interactive. MSK: TLSO in place, LUE in cock-up splint    Wt Readings from Last 3 Encounters:   08/28/18 165 lb 2 oz (74.9 kg)   06/05/18 164 lb (74.4 kg)   02/26/18 168 lb 9.6 oz (76.5 kg)       Laboratory data:   Lab Results   Component Value Date    WBC 5.1 08/27/2018    HGB 10.7 (L) 08/27/2018    HCT 32.8 (L) 08/27/2018    MCV 92.9 08/27/2018     (H) 08/27/2018       Lab Results   Component Value Date     08/27/2018    K 4.8 08/27/2018     08/27/2018    CO2 29 08/27/2018    BUN 12 08/27/2018    CREATININE 0.7 08/27/2018    GLUCOSE 124 08/27/2018    CALCIUM 9.9 08/27/2018        Therapy progress:  PT  Position Activity Restriction  Spinal Precautions: No Bending, No Lifting, No Twisting  Other position/activity restrictions:  Up with assistance; Elbow weight bearing on the L UE; TLSO on when HOB >30*  Objective     Sit to Stand: Supervision (From table mat, bed, and chair; intermittent VC to maintain L UE precaution)  Stand to sit: Supervision  Bed to Chair: Contact guard assistance (with and without AD)  Device: Platform Walker left  Assistance: Supervision  Distance: 350' x2,  which included amb up and down ramp x75' for up ramp and 75' for down
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
## 2018-08-28 NOTE — PLAN OF CARE
Problem: Falls - Risk of:  Goal: Will remain free from falls  Will remain free from falls   Outcome: Ongoing  Pt. Remains at risk for falls due to impaired gait. Fall prevention measures on-going. Will continue to monitor. Problem: Skin Integrity/Risk  Goal: No skin breakdown during hospitalization  Outcome: Ongoing  Skin remains clean and dry. Surgical site clean, dry and intact. Pt. Will no s/sx of skin breakdown or infection while hospitalized.

## 2018-08-28 NOTE — PROGRESS NOTES
Patient short term disability paper work sent to fax number patient gave this nurse. Copies gave to patient with copy of papers as well as acknowledgement of receipt of paperwork.
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
## 2018-08-29 ENCOUNTER — TELEPHONE (OUTPATIENT)
Dept: FAMILY MEDICINE CLINIC | Age: 43
End: 2018-08-29

## 2018-08-29 VITALS
OXYGEN SATURATION: 96 % | TEMPERATURE: 98.2 F | HEART RATE: 92 BPM | HEIGHT: 62 IN | SYSTOLIC BLOOD PRESSURE: 111 MMHG | BODY MASS INDEX: 31.24 KG/M2 | WEIGHT: 169.75 LBS | DIASTOLIC BLOOD PRESSURE: 71 MMHG | RESPIRATION RATE: 12 BRPM

## 2018-08-29 PROCEDURE — 6370000000 HC RX 637 (ALT 250 FOR IP): Performed by: PHYSICAL MEDICINE & REHABILITATION

## 2018-08-29 PROCEDURE — 6370000000 HC RX 637 (ALT 250 FOR IP): Performed by: STUDENT IN AN ORGANIZED HEALTH CARE EDUCATION/TRAINING PROGRAM

## 2018-08-29 RX ADMIN — DOCUSATE SODIUM,SENNOSIDES 2 TABLET: 50; 8.6 TABLET, FILM COATED ORAL at 07:57

## 2018-08-29 RX ADMIN — METHOCARBAMOL 1000 MG: 500 TABLET ORAL at 11:56

## 2018-08-29 RX ADMIN — BUPROPION HYDROCHLORIDE 300 MG: 150 TABLET, EXTENDED RELEASE ORAL at 07:57

## 2018-08-29 RX ADMIN — LISINOPRIL 5 MG: 5 TABLET ORAL at 07:57

## 2018-08-29 RX ADMIN — ACETAMINOPHEN 650 MG: 325 TABLET ORAL at 11:56

## 2018-08-29 RX ADMIN — OXYCODONE HYDROCHLORIDE 10 MG: 10 TABLET, FILM COATED, EXTENDED RELEASE ORAL at 07:57

## 2018-08-29 RX ADMIN — ACETAMINOPHEN 650 MG: 325 TABLET ORAL at 06:45

## 2018-08-29 RX ADMIN — GABAPENTIN 400 MG: 400 CAPSULE ORAL at 06:45

## 2018-08-29 RX ADMIN — DULOXETINE HYDROCHLORIDE 60 MG: 30 CAPSULE, DELAYED RELEASE ORAL at 07:57

## 2018-08-29 RX ADMIN — METHOCARBAMOL 1000 MG: 500 TABLET ORAL at 06:48

## 2018-08-29 RX ADMIN — OXYCODONE HYDROCHLORIDE 10 MG: 5 TABLET ORAL at 06:48

## 2018-08-29 RX ADMIN — GABAPENTIN 400 MG: 400 CAPSULE ORAL at 11:54

## 2018-08-29 RX ADMIN — OXYCODONE HYDROCHLORIDE 10 MG: 5 TABLET ORAL at 11:55

## 2018-08-29 RX ADMIN — NIFEDIPINE 60 MG: 60 TABLET, FILM COATED, EXTENDED RELEASE ORAL at 07:57

## 2018-08-29 ASSESSMENT — PAIN DESCRIPTION - PROGRESSION
CLINICAL_PROGRESSION: GRADUALLY WORSENING
CLINICAL_PROGRESSION: NOT CHANGED
CLINICAL_PROGRESSION: GRADUALLY IMPROVING

## 2018-08-29 ASSESSMENT — PAIN SCALES - GENERAL
PAINLEVEL_OUTOF10: 6
PAINLEVEL_OUTOF10: 6
PAINLEVEL_OUTOF10: 4
PAINLEVEL_OUTOF10: 7
PAINLEVEL_OUTOF10: 0
PAINLEVEL_OUTOF10: 8
PAINLEVEL_OUTOF10: 0

## 2018-08-29 ASSESSMENT — PAIN DESCRIPTION - ONSET
ONSET: PROGRESSIVE
ONSET: ON-GOING
ONSET: ON-GOING

## 2018-08-29 ASSESSMENT — PAIN DESCRIPTION - LOCATION
LOCATION: BACK

## 2018-08-29 ASSESSMENT — PAIN DESCRIPTION - PAIN TYPE
TYPE: ACUTE PAIN;SURGICAL PAIN
TYPE: ACUTE PAIN
TYPE: ACUTE PAIN

## 2018-08-29 ASSESSMENT — PAIN DESCRIPTION - DESCRIPTORS
DESCRIPTORS: ACHING
DESCRIPTORS: ACHING;SQUEEZING
DESCRIPTORS: ACHING;SPASM

## 2018-08-29 ASSESSMENT — PAIN DESCRIPTION - ORIENTATION
ORIENTATION: LOWER;MID

## 2018-08-29 ASSESSMENT — PAIN DESCRIPTION - FREQUENCY
FREQUENCY: INTERMITTENT

## 2018-08-29 NOTE — PROGRESS NOTES
Pt in bed, awake watching television. Complains of pain to surgical incision mid to lower back. Assessment completed. Nighttime medications given excluding scheduled Oxycontin, advised pt it was to close to last dose to be given. Found orange sherbet for pt, requested ice cream. Pt repositioned herself. Reminded pt to call for assistance with any needs. Pt up ad evi. Call light within reach. No additional needs at this time.

## 2018-08-29 NOTE — PROGRESS NOTES
Discharge instructions explained to pt and pt's boyfriend, both voiced understanding and all questions answered. Pt aware of follow up appts and medication regimen. PCP, Dr. Charity Clark, and Dr. Rober Higuera appts scheduled. Unable to schedule neurosurgery appt at this time, waiting on call back. Pt understands to call [facility] in the morning if they do not call back by end of business day. Pt provided with phone number and address to clinic. All medications picked up from outpatient pharmacy. Pt discharged to home. Pt transported via personal vehicle. Pt discharged with AVS/VIRY and all personal belongings.

## 2018-08-29 NOTE — PROGRESS NOTES
Pt up to chair eating breakfast at this time. Pt has c/o pain in back that is well controlled by routine and prn pain medication. No s/sx of distress noted. Tolerated morning meds and fluids well. Fall precautions in place. Call light maintained within reach. Will continue to monitor throughout shift.

## 2018-08-29 NOTE — PROGRESS NOTES
Spoke with  from [address] [place] [address] regarding pt's f/u appt. Unable to schedule at this time d/t Dr. Anil Wilson being in surgery at this time and we need to know when he thinks is appropriate to see pt. She will call back by the end of the business day today.
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
## 2018-08-30 ENCOUNTER — TELEPHONE (OUTPATIENT)
Dept: FAMILY MEDICINE CLINIC | Age: 43
End: 2018-08-30

## 2018-08-30 ENCOUNTER — OFFICE VISIT (OUTPATIENT)
Dept: FAMILY MEDICINE CLINIC | Age: 43
End: 2018-08-30

## 2018-08-30 VITALS
HEART RATE: 112 BPM | OXYGEN SATURATION: 96 % | DIASTOLIC BLOOD PRESSURE: 82 MMHG | HEIGHT: 62 IN | BODY MASS INDEX: 31.03 KG/M2 | SYSTOLIC BLOOD PRESSURE: 120 MMHG | WEIGHT: 168.6 LBS

## 2018-08-30 DIAGNOSIS — T07.XXXA MULTIPLE TRAUMA: Primary | ICD-10-CM

## 2018-08-30 DIAGNOSIS — I10 ESSENTIAL HYPERTENSION: ICD-10-CM

## 2018-08-30 PROCEDURE — 99214 OFFICE O/P EST MOD 30 MIN: CPT | Performed by: NURSE PRACTITIONER

## 2018-08-30 ASSESSMENT — ENCOUNTER SYMPTOMS
RHINORRHEA: 0
SHORTNESS OF BREATH: 0
CHEST TIGHTNESS: 0
NAUSEA: 0
COUGH: 0
CONSTIPATION: 0
SINUS PRESSURE: 0
WHEEZING: 0
ABDOMINAL PAIN: 0
APNEA: 0
EYE PAIN: 0
DIARRHEA: 0
EYE REDNESS: 0
ABDOMINAL DISTENTION: 0
VOMITING: 0
BLOOD IN STOOL: 0
BACK PAIN: 1
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
## 2018-09-04 ENCOUNTER — OFFICE VISIT (OUTPATIENT)
Dept: ORTHOPEDIC SURGERY | Age: 43
End: 2018-09-04

## 2018-09-04 ENCOUNTER — HOSPITAL ENCOUNTER (OUTPATIENT)
Dept: OCCUPATIONAL THERAPY | Age: 43
Discharge: HOME | End: 2018-09-05

## 2018-09-04 ENCOUNTER — HOSPITAL ENCOUNTER (OUTPATIENT)
Dept: PHYSICAL THERAPY | Age: 43
Discharge: HOME | End: 2018-09-04
Attending: ORTHOPAEDIC SURGERY | Admitting: ORTHOPAEDIC SURGERY

## 2018-09-04 VITALS
DIASTOLIC BLOOD PRESSURE: 81 MMHG | SYSTOLIC BLOOD PRESSURE: 119 MMHG | HEIGHT: 62 IN | WEIGHT: 160 LBS | HEART RATE: 100 BPM | BODY MASS INDEX: 29.44 KG/M2

## 2018-09-04 DIAGNOSIS — S52.592A OTHER CLOSED FRACTURE OF DISTAL END OF LEFT RADIUS, INITIAL ENCOUNTER: ICD-10-CM

## 2018-09-04 DIAGNOSIS — M25.532 LEFT WRIST PAIN: Primary | ICD-10-CM

## 2018-09-04 PROBLEM — S52.502A CLOSED FRACTURE OF LEFT DISTAL RADIUS: Status: ACTIVE | Noted: 2018-09-04

## 2018-09-04 PROCEDURE — 99203 OFFICE O/P NEW LOW 30 MIN: CPT | Performed by: ORTHOPAEDIC SURGERY

## 2018-09-05 DIAGNOSIS — T07.XXXA MULTIPLE TRAUMA: ICD-10-CM

## 2018-09-05 RX ORDER — OXYCODONE HCL 10 MG/1
10 TABLET, FILM COATED, EXTENDED RELEASE ORAL EVERY 12 HOURS SCHEDULED
Qty: 14 TABLET | Refills: 0 | Status: CANCELLED | OUTPATIENT
Start: 2018-09-05 | End: 2018-09-12

## 2018-09-05 RX ORDER — OXYCODONE HYDROCHLORIDE 5 MG/1
5 TABLET ORAL
Qty: 56 TABLET | Refills: 0 | Status: CANCELLED | OUTPATIENT
Start: 2018-09-05 | End: 2018-09-12

## 2018-09-05 NOTE — TELEPHONE ENCOUNTER
Patient needs a refill on her pain medications OXYCODONE 5 MG immediate release tablets and oxyCODONE 10 mg extended release tablets sent to [facility]. Her spine doctor originally prescribed these for her, but she has been on the phone with their office for 2 days and hasn't manage to contact that doctor.

## 2018-09-26 ENCOUNTER — OFFICE VISIT (OUTPATIENT)
Dept: ORTHOPEDIC SURGERY | Age: 43
End: 2018-09-26
Payer: OTHER GOVERNMENT

## 2018-09-26 DIAGNOSIS — S52.592A OTHER CLOSED FRACTURE OF DISTAL END OF LEFT RADIUS, INITIAL ENCOUNTER: Primary | ICD-10-CM

## 2018-09-26 PROCEDURE — 99213 OFFICE O/P EST LOW 20 MIN: CPT | Performed by: ORTHOPAEDIC SURGERY
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
## 2018-10-23 ENCOUNTER — OFFICE VISIT (OUTPATIENT)
Dept: ORTHOPEDIC SURGERY | Age: 43
End: 2018-10-23
Payer: OTHER GOVERNMENT

## 2018-10-23 DIAGNOSIS — M25.532 LEFT WRIST PAIN: ICD-10-CM

## 2018-10-23 DIAGNOSIS — S52.592A OTHER CLOSED FRACTURE OF DISTAL END OF LEFT RADIUS, INITIAL ENCOUNTER: Primary | ICD-10-CM

## 2018-10-23 PROCEDURE — 99213 OFFICE O/P EST LOW 20 MIN: CPT | Performed by: ORTHOPAEDIC SURGERY
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
## 2018-10-30 ENCOUNTER — HOSPITAL ENCOUNTER (OUTPATIENT)
Dept: OCCUPATIONAL THERAPY | Age: 43
Setting detail: THERAPIES SERIES
Discharge: HOME | End: 2018-10-30
Payer: OTHER GOVERNMENT

## 2018-10-30 PROCEDURE — G0283 ELEC STIM OTHER THAN WOUND: HCPCS | Performed by: OCCUPATIONAL THERAPIST

## 2018-10-30 PROCEDURE — 97165 OT EVAL LOW COMPLEX 30 MIN: CPT | Performed by: OCCUPATIONAL THERAPIST

## 2018-10-30 PROCEDURE — 97110 THERAPEUTIC EXERCISES: CPT | Performed by: OCCUPATIONAL THERAPIST

## 2018-10-30 PROCEDURE — G8985 CARRY GOAL STATUS: HCPCS | Performed by: OCCUPATIONAL THERAPIST

## 2018-10-30 PROCEDURE — G8984 CARRY CURRENT STATUS: HCPCS | Performed by: OCCUPATIONAL THERAPIST

## 2018-10-30 NOTE — FLOWSHEET NOTE
endurance, ROM of scapular, scapulothoracic and UE control with self care, reaching, carrying, lifting, house/yardwork, driving/computer work  [] (39597) Reviewed/Progressed HEP activities related to improving balance, coordination, kinesthetic sense, posture, motor skill, proprioception of scapular, scapulothoracic and UE control with self care, reaching, carrying, lifting, house/yardwork, driving/computer work    [] Comments:    Manual Treatments:  PROM / STM / Oscillations-Mobs:  G-I, II, III, IV (PA's, Inf., Post.)  [] (27044) Provided manual therapy to mobilize soft tissue/joints of cervical/CT, scapular GHJ and UE for the purpose of modulating pain, promoting relaxation,  increasing ROM, reducing/eliminating soft tissue swelling/inflammation/restriction, improving soft tissue extensibility and allowing for proper ROM for normal function with self care, reaching, carrying, lifting, house/yardwork, driving/computer work  [] Comments:    ADL Training:  []  (87377) Provided self-care/home management training related to activities of daily living and compensatory training, and/or use of adaptive equipment   [] Comments:     Splinting:  [] Fabrication of:   [] (21668) Checkout for orthotic/prosthetic use, established patient   [] (06737) Orthotic management and training (fitting and assessment)  [] Comments:    Charges:  Timed Code Treatment Minutes: 30   Total Treatment Minutes: 60     [x] EVAL (LOW) 54591   [] OT Re-eval (93325)  [] EVAL (MOD) 20516   [] EVAL (HIGH) 27331       [x] Rj (04360) x  2   [] ZHUNW(14876)  [] NMR (12721) x      [] Estim (attended) (76920)   [] Manual ([id number]) x       [] US (29432)  [] TA (09474) x      [] Paraffin (53201)  [] ADL  (63031) x     [] Splint/L code:    [x] Estim (unattended) (10499)  [] Other:         Frequency/Duration:  1-2days per week for 6 weeks (12/11/18)        GOALS:  Short Term Goals: To be achieved in: 2 weeks  1.  Independent in HEP and progression per patient

## 2018-10-30 NOTE — PLAN OF CARE
[address] [place] [address] [place] [address]  Phone [phone]      Fax [phone]      Occupational Therapy/Hand Therapy Certification  Dear Referring Practitioner: Hernandez Noble,     We had the pleasure of evaluating the following patient for occupational therapy services at [address] [place] [address]. A summary of our findings can be found in the initial assessment below. This includes our plan of care. If you have any questions or concerns regarding these findings, please do not hesitate to contact me at the office phone number checked above. Thank you for the referral.     Physician Signature:_______________________________Date:__________________  By signing above (or electronic signature), therapists plan is approved by physician      Patient: Shu Wang   : 1975   MRN: [id number]  Referring Physician: Referring Practitioner: Hernandez Noble      Evaluation Date: 10/30/2018      Medical Diagnosis Information:  Diagnosis: L distal radius fracture  S52.592. A      Treatment Diagnosis: L wrist /forearm pain   M25.532   P81.800                  Insurance information: OT Insurance Information: Human   Date of Injury: 18  Date of Surgery: NA      Precautions/ Contra-indications:   Latex Allergy:  [x]No      []Yes  Pacemaker:  [x] No       [] Yes     Preferred Language for Healthcare:   [x]English       []other:      G-Codes:  OT G-codes  Functional Assessment Tool Used: Quick DASH  Score: 64%  Functional Limitation: Carrying, moving and handling objects  Carrying, Moving and Handling Objects Current Status (): At least 60 percent but less than 80 percent impaired, limited or restricted  Carrying, Moving and Handling Objects Goal Status ():  At least 20 percent but less than 40 percent impaired, limited or restricted    [x] Patient reported history, allergies, and medications reviewed - see intake form. SUBJECTIVE:  Background/Relevant Medical & Therapy History:   History of Injury/ Mechanism of Injury: 8/11/18  Jumped off amilcar into [facility] [place] and landed wrong resulting in lumbar spline fracture requiring fusion and L wrist injury. She was initially treated in [place], [place] and then had acute rehab in [place].   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    Recreational Activities/Meaningful Interests:     Prior Level of Function: [x] Independent with ADLs/IADLs     [] Assistance needed (describe):    Patient-Identified Primary Performance Deficits (to be addressed in POC):   [] bathing    [x] household tasks (cooking/cleaning)   [] dressing    [] self feeding   [] grooming    [x] work/education   [] functional mobility   [] sleeping/rest   [] toileting/hygiene   [] recreational activities   [] driving    [] community/social participation   [x] other: lifting and pouring out of a pan, wt bearing    Comorbidities Affecting Functional Performance:     [x]Anxiety (F41.9)/Depression (F32.9)   []Diabetes Type 1(E10.65) or 2 (E11.65)   []Rheumatoid Arthritis (M05.9)  []Fibromyalgia (M79.7)  []Neuropathy(G60.9)  []Osteoarthritis(M19.91)  []None   [x]Other:HTN  ; recent back surgery, vertigo  Hand Dominance:   [x]  Right    [] Left      OBJECTIVE:        AROM: Right Left   IF MP  PIP  DIP       LF MP  PIP  DIP       RF MP  PIP   DIP       SF MP  PIP  DIP       Digits: tips to DPFC           Thumb MP  IP     Thumb tip to DPFC     Thumb RA               PA     Wrist Ext/Flex            RD/UD 60/70  20/30 50/60  20/20 Forearm sup/pron    85/85   Elbow ext/flex       Shoulder Flex                   Abd                   IR/ER          Edema:               Strength:      II 50 25   Lateral Pinch     3 Point Pinch     Tip Pinch     MMT:   Wrist ext  Wrist flex        4+/5  5-/5     Observations (including splints, bandages, incisions, scars): Unremarkable    Sensation: [] No reported deficits  [] Intact to light touch    [] [place] test completed, findings as noted:  [x] Other: tingling in hand in L wrist and fingers several times during the day and at night    Palpation: pain with palpation to ulnar styloid    Functional Mobility/Transfers/Gait: [x] Independent - no significant gait deviations  [] Assistance needed   [] Assistive device used: Falls Risk Assessment (30 days):   [x] Falls Risk assessed and no intervention required. [] Falls Risk assessed and Patient requires intervention due to being higher risk   TUG score (>12s at risk):     [] Falls education provided, including      Review Of Systems (ROS): [x]Performed Review of systems (Integumentary, CardioPulmonary, Neurological) by intake and observation. Intake form has been scanned into medical record. Patient has been instructed to contact their primary care physician regarding ROS issues if not already being addressed at this time. ASSESSMENT:   This patient presents with signs and symptoms consistent with the medical diagnosis provided by the referring physician.      Impairments (physical, cognitive and/or psychosocial):  [x] Decreased mobility   [x] Weakness    [] Hypersensitivity   [x] Pain/tenderness   [] Edema/swelling   [] Decreased coordination (fine/gross motor)   [] Impaired body mechanics  [] Sensory loss  [] Loss of balance   [] Other:      Performance Deficits (to be addressed in plan of care):   [] Bathing    [x] Household Tasks (cooking/cleaning)   [] Dressing    [] Self Feeding   [] Grooming    [] Work/Education   [] Functional to 2-3/10 to facilitate improvement in  performance with gripping, lifting, and wt bearing        OCCUPATIONAL THERAPY EVALUATION COMPLEXITY JUSTIFICATION:    [] An occupational profile and medical/therapy history, which includes:   [x] a brief history including medical and/or therapy records relating to the     presenting problem   [] an expanded review of medical and/or therapy records and additional review     of physical, cognitive or psychosocial history related to current functional    performance   [] an extensive additional review of review of medical and/or therapy records   and physical, cognitive, or psychosocial history related to current    functional performance    [] An assessment that identifies performance deficits (relating to physical, cognitive, or psychosocial skills) that result in activity limitations and/or participation restrictions:   [] 1-3 performance deficits   [x] 3-5 performance deficits   [] 5 or more performance deficits    [] Clinical decision making of:   [x] low complexity, including analysis of occupational profile, data analysis from problem focused assessment, and consideration of a limited number of treatment options. No comorbidities affect occupational performance. No task modifications or assistance needed to complete evaluation. [] moderate complexity, including analysis of occupational profile, data analysis from detailed assessment and consideration of several treatment options. Comorbidities that affect occupational performance may be present. Minimal to moderate task modifications or assistance needed to complete assessment. [] high complexity, including analysis of occupational profile, analysis of data from comprehensive assessment and consideration of multiple treatment options. Multiple comorbidities present that affect occupational performance. Significant task modifications or assistance needed to complete assessment.     Evaluation Code:  [x] Low Complexity EVAL 92990 (typically 30 minutes face to face)  [] Mod Complexity EVAL 47902 (typically 45 minutes face to face)  [] High Complexity EVAL 78998 (typically 60 minutes face to face)             Electronically signed by:   Shubham Aguayo OTR/L [address] [place] [address]  OT [id number]

## 2018-11-06 ENCOUNTER — HOSPITAL ENCOUNTER (OUTPATIENT)
Dept: OCCUPATIONAL THERAPY | Age: 43
Setting detail: THERAPIES SERIES
Discharge: HOME | End: 2018-11-06
Payer: OTHER GOVERNMENT

## 2018-11-06 PROCEDURE — 97110 THERAPEUTIC EXERCISES: CPT | Performed by: OCCUPATIONAL THERAPIST

## 2018-11-06 PROCEDURE — 97140 MANUAL THERAPY 1/> REGIONS: CPT | Performed by: OCCUPATIONAL THERAPIST

## 2018-11-06 PROCEDURE — G0283 ELEC STIM OTHER THAN WOUND: HCPCS | Performed by: OCCUPATIONAL THERAPIST

## 2018-11-06 NOTE — FLOWSHEET NOTE
improving balance, coordination, kinesthetic sense, posture, motor skill, proprioception and motor activation to allow for proper function of scapular, scapulothoracic and UE control with self care, carrying, lifting, driving/computer work  [] Comments:    Home Exercise Program:    [] (57596) Reviewed/Progressed HEP activities related to strengthening, flexibility, endurance, ROM of scapular, scapulothoracic and UE control with self care, reaching, carrying, lifting, house/yardwork, driving/computer work  [] (83578) Reviewed/Progressed HEP activities related to improving balance, coordination, kinesthetic sense, posture, motor skill, proprioception of scapular, scapulothoracic and UE control with self care, reaching, carrying, lifting, house/yardwork, driving/computer work    [] Comments:    Manual Treatments:  PROM / STM / Oscillations-Mobs:  G-I, II, III, IV (PA's, Inf., Post.)  [x] (89900) Provided manual therapy to mobilize soft tissue/joints of cervical/CT, scapular GHJ and UE for the purpose of modulating pain, promoting relaxation,  increasing ROM, reducing/eliminating soft tissue swelling/inflammation/restriction, improving soft tissue extensibility and allowing for proper ROM for normal function with self care, reaching, carrying, lifting, house/yardwork, driving/computer work  [x] Comments:    ADL Training:  []  (27542) Provided self-care/home management training related to activities of daily living and compensatory training, and/or use of adaptive equipment   [] Comments:     Splinting:  [] Fabrication of:   [] (41767) Checkout for orthotic/prosthetic use, established patient   [] (22433) Orthotic management and training (fitting and assessment)  [] Comments:    Charges:  Timed Code Treatment Minutes: 45     Total Treatment Minutes: 65     [] EVAL (LOW) 19760   [] OT Re-eval (56379)  [] EVAL (MOD) 28947   [] EVAL (HIGH) 51363       [x] Rj (92983) x  2   [] MITMP(83287)  [] NMR (39029) x      [] Estim

## 2018-11-09 ENCOUNTER — HOSPITAL ENCOUNTER (OUTPATIENT)
Dept: OCCUPATIONAL THERAPY | Age: 43
Setting detail: THERAPIES SERIES
Discharge: HOME | End: 2018-11-09
Payer: OTHER GOVERNMENT

## 2018-11-09 PROCEDURE — 97110 THERAPEUTIC EXERCISES: CPT | Performed by: OCCUPATIONAL THERAPIST

## 2018-11-09 PROCEDURE — 97140 MANUAL THERAPY 1/> REGIONS: CPT | Performed by: OCCUPATIONAL THERAPIST

## 2018-11-09 PROCEDURE — G0283 ELEC STIM OTHER THAN WOUND: HCPCS | Performed by: OCCUPATIONAL THERAPIST

## 2018-11-09 NOTE — FLOWSHEET NOTE
11/6/18 Pt. Focused on what she describes as \"popping\" in ulnar side of wrist with wrist ext and sup. No relief with coban taping so tried K tape dorsal forearm and ulnar wrist    Treatment/Activity Tolerance:  [] Patient tolerated treatment well [] Patient limited by fatigue  [x] Patient limited by pain  [] Patient limited by other medical complications  [] Other:     Prognosis: [x] Good [] Fair  [] Poor    Patient Requires Follow-up: [x] Yes  [] No    PLAN: Progress ROM and strengthening as tolerated. [x] Continue per plan of care [] Alter current plan (see comments)  [] Plan of care initiated [] Hold pending MD visit [] Discharge    Electronically signed by:  Bhanu Rausch OTR/L [address] [place] [address]  OT [id number]

## 2018-11-13 ENCOUNTER — HOSPITAL ENCOUNTER (OUTPATIENT)
Dept: OCCUPATIONAL THERAPY | Age: 43
Setting detail: THERAPIES SERIES
Discharge: HOME | End: 2018-11-13
Payer: OTHER GOVERNMENT

## 2018-11-13 ENCOUNTER — OFFICE VISIT (OUTPATIENT)
Dept: ORTHOPEDIC SURGERY | Age: 43
End: 2018-11-13
Payer: OTHER GOVERNMENT

## 2018-11-13 DIAGNOSIS — S52.592A OTHER CLOSED FRACTURE OF DISTAL END OF LEFT RADIUS, INITIAL ENCOUNTER: Primary | ICD-10-CM

## 2018-11-13 PROCEDURE — 97140 MANUAL THERAPY 1/> REGIONS: CPT | Performed by: OCCUPATIONAL THERAPIST

## 2018-11-13 PROCEDURE — 99213 OFFICE O/P EST LOW 20 MIN: CPT | Performed by: ORTHOPAEDIC SURGERY

## 2018-11-13 PROCEDURE — 97110 THERAPEUTIC EXERCISES: CPT | Performed by: OCCUPATIONAL THERAPIST

## 2018-11-13 PROCEDURE — G0283 ELEC STIM OTHER THAN WOUND: HCPCS | Performed by: OCCUPATIONAL THERAPIST

## 2018-11-13 NOTE — FLOWSHEET NOTE
to activities of daily living and compensatory training, and/or use of adaptive equipment   [] Comments:     Splinting:  [] Fabrication of:   [] (08303) Checkout for orthotic/prosthetic use, established patient   [] (05682) Orthotic management and training (fitting and assessment)  [] Comments:    Charges:  Timed Code Treatment Minutes: 52     Total Treatment Minutes: 75     [] EVAL (LOW) 64065   [] OT Re-eval (92784)  [] EVAL (MOD) 39897   [] EVAL (HIGH) 31359       [x] Rj (32146) x  2   [] CHKVA(02811)  [] NMR (24123) x      [] Estim (attended) (04607)   [x] Manual ([id number]) x  1    [] US (74152)  [] TA (59523) x      [] Paraffin (73706)  [] ADL  (88 [phone] [id number]) x     [] Splint/L code:    [x] Estim (unattended) (40595)  [] Other:         Frequency/Duration:  1-2days per week for 6 weeks (12/11/18)        GOALS:  Short Term Goals: To be achieved in: 2 weeks  1. Independent in HEP and progression per patient tolerance, in order to prevent re-injury. 2. Patient will have a decrease in pain to facilitate improvement in movement, function, and ADLs as indicated by Functional Deficits. Long Term Goals to be achieved in 6  weeks, including patient directed goals to address identified performance deficits:  1) Pt to be independent in graded HEP progression with a good level of effort and compliance. 2) Pt to report a score of 25 % or less on the Quick DASH disability questionnaire for increased performance with carrying, moving, and handling objects. MET 11/131/8  3) Pt will demonstrate increased ROM to wrist ex 60, UD 30 degrees  for improved  performance with lifting and wt bearing tasks. 4) Pt will demonstrate increased strength to L  within 10# of R and wrist/FA strength 5-/5  for improved  performance with lifting  and pouring during house hold tasks.   5) Pt will have a decrease in pain to 2-3/10 to facilitate improvement in  performance with gripping, lifting, and wt bearing     Progression Towards Functional

## 2018-11-14 ENCOUNTER — APPOINTMENT (OUTPATIENT)
Dept: PHYSICAL THERAPY | Age: 43
End: 2018-11-14
Payer: OTHER GOVERNMENT

## 2018-11-15 ENCOUNTER — HOSPITAL ENCOUNTER (OUTPATIENT)
Dept: OCCUPATIONAL THERAPY | Age: 43
Setting detail: THERAPIES SERIES
Discharge: HOME | End: 2018-11-15
Payer: OTHER GOVERNMENT

## 2018-11-20 ENCOUNTER — HOSPITAL ENCOUNTER (OUTPATIENT)
Dept: OCCUPATIONAL THERAPY | Age: 43
Setting detail: THERAPIES SERIES
Discharge: HOME | End: 2018-11-20
Payer: OTHER GOVERNMENT

## 2018-11-20 PROCEDURE — 97110 THERAPEUTIC EXERCISES: CPT | Performed by: OCCUPATIONAL THERAPIST

## 2018-11-20 PROCEDURE — 97140 MANUAL THERAPY 1/> REGIONS: CPT | Performed by: OCCUPATIONAL THERAPIST

## 2018-11-20 PROCEDURE — 97530 THERAPEUTIC ACTIVITIES: CPT | Performed by: OCCUPATIONAL THERAPIST

## 2018-11-27 ENCOUNTER — OFFICE VISIT (OUTPATIENT)
Dept: ORTHOPEDIC SURGERY | Age: 43
End: 2018-11-27
Payer: OTHER GOVERNMENT

## 2018-11-27 ENCOUNTER — HOSPITAL ENCOUNTER (OUTPATIENT)
Dept: PHYSICAL THERAPY | Age: 43
Setting detail: THERAPIES SERIES
Discharge: HOME | End: 2018-11-27
Payer: OTHER GOVERNMENT

## 2018-11-27 DIAGNOSIS — S52.592A OTHER CLOSED FRACTURE OF DISTAL END OF LEFT RADIUS, INITIAL ENCOUNTER: Primary | ICD-10-CM

## 2018-11-27 DIAGNOSIS — M77.8 LEFT WRIST TENDONITIS: ICD-10-CM

## 2018-11-27 DIAGNOSIS — S69.82XA INJURY OF TRIANGULAR FIBROCARTILAGE COMPLEX (TFCC) OF LEFT WRIST, INITIAL ENCOUNTER: ICD-10-CM

## 2018-11-27 PROCEDURE — 20605 DRAIN/INJ JOINT/BURSA W/O US: CPT | Performed by: ORTHOPAEDIC SURGERY

## 2018-11-27 PROCEDURE — 97140 MANUAL THERAPY 1/> REGIONS: CPT

## 2018-11-27 PROCEDURE — G8978 MOBILITY CURRENT STATUS: HCPCS

## 2018-11-27 PROCEDURE — 99213 OFFICE O/P EST LOW 20 MIN: CPT | Performed by: ORTHOPAEDIC SURGERY

## 2018-11-27 PROCEDURE — 97161 PT EVAL LOW COMPLEX 20 MIN: CPT

## 2018-11-27 PROCEDURE — 97110 THERAPEUTIC EXERCISES: CPT

## 2018-11-27 PROCEDURE — G8979 MOBILITY GOAL STATUS: HCPCS

## 2018-11-27 NOTE — PROGRESS NOTES
Assessment: 60-year-old female presenting with history of left wrist injury after jumping off of a amilcar  1. Left closed radial styloid fracture   2. Left ulnar-sided wrist pain, TFCC tear, likely mild ECU tenosynovitis and ECU sub-sheath injury  3. Left carpal tunnel syndrome    Treatment Plan: Ms. Chico Graves continues to demonstrate improvement with her range of motion. She has had lingering ulnar-sided wrist pain likely related to TFCC injury as well as component of ECU sub-sheath injury. We did discuss today options for treatment including continued conservative treatment. I did suggest ulnocarpal corticosteroid injection to help improve her symptoms and calm down some of her inflammation. She is amenable to this plan today  The risks and benefits of steroid injection were discussed thoroughly. Risks discussed included infection, adverse drug reaction, increased pain post-injection, skin de-pigmentation, fatty atrophy, and persistent clinical symptoms despite injection. The injection was given after cleansing the skin with alcohol. The patients left ulnocarpal wrist joint was prepped for steroid injection. Using sterile technique, the left ulnocarpal wrist joint was injected with a mixture of 0.8 ml of 40mg/ml Kenalog and 1 mL 1% lidocaine. Appropriate post injection instructions were given. The patient tolerated the injection well and a Band-aid was placed. With regards to her numbness and tingling I do think that she has some symptoms of carpal tunnel syndrome that have been bothering her at night. I recommend that she wear her wrist brace at night for initial treatment and we will continue to monitor  Plan for follow-up in approximately 1 month for repeat evaluation of her left wrist and left carpal tunnel syndrome symptoms    No Follow-up on file.          History of Present Illness  Essie Montoya is a 44 y.o. female here today for a follow-up for Her left wrist with history of radial styloid fracture as well to median, ulnar, radial nerve without deficit  Provocative testing for carpal tunnel with positive Tinel's overlying carpal tunnel  Negative direct carpal tunnel compression test  No Thenar  Atrophy or intrinsic wasting  Capillary refill brisk all fingers, symmetric  Gross sensation intact to light touch median/ulnar/radial nerves  Sensation intact to radial/ulnar aspect of fingertip        Radiology:    X-rays obtained and reviewed in office:  No additional images obtained today    Additional Diagnostic Test Findings:  MRI left wrist joint without contrast 10/30/2018  Radial styloid fracture with no significant articular surface depression  2. High-grade sprain/partial tear of the dorsal scapholunate ligament with minimal dorsal scapholunate interval widening  3. TFCC Stout 1A tear and 1B sprain with small DRUJ effusion  As interpreted by [facility] radiologist, images personally reviewed by me  Office Procedures:  No orders of the defined types were placed in this encounter. Parminder Hayden MD  Orthopaedic Surgeon, [address]    Contact Information:  Herbie Fairchild: [phone]  Clinical )    This dictation was performed with a verbal recognition program [facility]) and it was checked for errors. It is possible that there are still dictated errors within this office note. If so, please bring any errors to my attention for an addendum. All efforts were made to ensure that this office note is accurate.

## 2018-11-28 ENCOUNTER — HOSPITAL ENCOUNTER (OUTPATIENT)
Dept: OCCUPATIONAL THERAPY | Age: 43
Setting detail: THERAPIES SERIES
Discharge: HOME | End: 2018-11-28
Payer: OTHER GOVERNMENT

## 2018-11-28 PROCEDURE — 97140 MANUAL THERAPY 1/> REGIONS: CPT | Performed by: OCCUPATIONAL THERAPIST

## 2018-11-28 PROCEDURE — 97110 THERAPEUTIC EXERCISES: CPT | Performed by: OCCUPATIONAL THERAPIST

## 2018-11-28 NOTE — FLOWSHEET NOTE
overhead  3#  10X     and pull  Red putty    Shoulder flex 2#  10X2   Manual  IASTM      k tape  Ulnar dorsal FA and ulnar wrist    Tried coban taping on wrist with no noted decrese  in pain with sup/pron IASTM  Forearm and wrist IASTM  Forearm and wrist                                    IASTM  Forearm and wrist   Powerweb     Grab and twist, red x20                                                     Therapeutic Exercise and NMR:  [x] (17911) Provided verbal/tactile cueing for activities related to strengthening, flexibility, endurance, ROM  for improvements in scapular, scapulothoracic and UE control with self care, reaching, carrying, lifting, house/yardwork, driving/computer work. [x] (77246) Provided verbal/tactile cueing for activities related to improving balance, coordination, kinesthetic sense, posture, motor skill, proprioception  to assist with  scapular, scapulothoracic and UE control with self care, reaching, carrying, lifting, house/yardwork, driving/computer work.   [x] Comments: Therapy today with 2\" coban wrap to wrist with ulnar pressure    Therapeutic Activities:    [] (00988 or 28676) Provided verbal/tactile cueing for activities related to improving balance, coordination, kinesthetic sense, posture, motor skill, proprioception and motor activation to allow for proper function of scapular, scapulothoracic and UE control with self care, carrying, lifting, driving/computer work  [] Comments:    Home Exercise Program:    [x] (39018) Reviewed/Progressed HEP activities related to strengthening, flexibility, endurance, ROM of scapular, scapulothoracic and UE control with self care, reaching, carrying, lifting, house/yardwork, driving/computer work  [] (50892) Reviewed/Progressed HEP activities related to improving balance, coordination, kinesthetic sense, posture, motor skill, proprioception of scapular, scapulothoracic and UE control with self care, reaching, carrying, lifting, house/yardwork,

## 2018-11-29 ENCOUNTER — HOSPITAL ENCOUNTER (OUTPATIENT)
Dept: PHYSICAL THERAPY | Age: 43
Setting detail: THERAPIES SERIES
Discharge: HOME | End: 2018-11-29
Payer: OTHER GOVERNMENT

## 2018-11-29 ENCOUNTER — OFFICE VISIT (OUTPATIENT)
Dept: FAMILY MEDICINE CLINIC | Age: 43
End: 2018-11-29
Payer: OTHER GOVERNMENT

## 2018-11-29 VITALS
BODY MASS INDEX: 31.1 KG/M2 | DIASTOLIC BLOOD PRESSURE: 76 MMHG | OXYGEN SATURATION: 98 % | SYSTOLIC BLOOD PRESSURE: 128 MMHG | WEIGHT: 169 LBS | HEART RATE: 74 BPM | HEIGHT: 62 IN | RESPIRATION RATE: 12 BRPM

## 2018-11-29 DIAGNOSIS — I10 ESSENTIAL HYPERTENSION: Primary | ICD-10-CM

## 2018-11-29 DIAGNOSIS — M77.8 LEFT WRIST TENDONITIS: ICD-10-CM

## 2018-11-29 DIAGNOSIS — S69.82XS INJURY OF TRIANGULAR FIBROCARTILAGE COMPLEX (TFCC) OF LEFT WRIST, SEQUELA: ICD-10-CM

## 2018-11-29 DIAGNOSIS — T07.XXXA MULTIPLE TRAUMA: ICD-10-CM

## 2018-11-29 PROCEDURE — 90471 IMMUNIZATION ADMIN: CPT | Performed by: NURSE PRACTITIONER

## 2018-11-29 PROCEDURE — 97110 THERAPEUTIC EXERCISES: CPT

## 2018-11-29 PROCEDURE — 97140 MANUAL THERAPY 1/> REGIONS: CPT

## 2018-11-29 PROCEDURE — 99213 OFFICE O/P EST LOW 20 MIN: CPT | Performed by: NURSE PRACTITIONER

## 2018-11-29 PROCEDURE — 90688 IIV4 VACCINE SPLT 0.5 ML IM: CPT | Performed by: NURSE PRACTITIONER

## 2018-11-29 ASSESSMENT — ENCOUNTER SYMPTOMS
WHEEZING: 0
ABDOMINAL PAIN: 0
EYE PAIN: 0
VOMITING: 0
SINUS PRESSURE: 0
DIARRHEA: 0
SHORTNESS OF BREATH: 0
CONSTIPATION: 0
NAUSEA: 0
CHEST TIGHTNESS: 0
BLOOD IN STOOL: 0
EYE REDNESS: 0
RHINORRHEA: 0
APNEA: 0
ABDOMINAL DISTENTION: 0
COUGH: 0

## 2018-11-29 NOTE — FLOWSHEET NOTE
Patient stated goal: \"regain strength and endurance\"     Therapist goals for Patient:   Short Term Goals: To be achieved in: 2 weeks  1. Independent in HEP and progression per patient tolerance, in order to prevent re-injury. 2. Patient will have a decrease in pain to facilitate improvement in movement, function, and ADLs as indicated by Functional Deficits. Long Term Goals: To be achieved in: 12 weeks  1. Disability index score of 35% or less for the LAURIE to assist with reaching prior level of function. 2. Patient will demonstrate increased AROM to WNL, good LS mobility, good hip ROM to allow for proper joint functioning as indicated by patients Functional Deficits. 3. Patient will demonstrate an increase in Strength to good proximal hip and core activation to allow for proper functional mobility as indicated by patients Functional Deficits. 4. Patient will return to all functional activities without increased symptoms or restriction. 5. Patient will stand > 15 min with no pain. Progression Towards Functional goals:  [] Patient is progressing as expected towards functional goals listed. [] Progression is slowed due to complexities listed. [] Progression has been slowed due to co-morbidities. [x] Plan just implemented, too soon to assess goals progression  [] Other:     ASSESSMENT: Discussed using bike at home for increased endurance and strength x5-6 minutes as tolerated 1-2x/day. Also discussed trying to find donut pillow to use for coccyx pain relief. Pt continues to have difficulty with exercises due to overall weakness and neuromuscular limitations as pt still has numbness from accident/surgery. Added supine hip adduction with kegels this date as well without increased pain. Continue to work on improving core strength for improved functional mobility, parenting activity as well as ability to return to full work duty without limitations.      Treatment/Activity Tolerance:  [x] Patient

## 2018-12-03 ENCOUNTER — HOSPITAL ENCOUNTER (OUTPATIENT)
Dept: PHYSICAL THERAPY | Age: 43
Setting detail: THERAPIES SERIES
Discharge: HOME | End: 2018-12-03
Payer: OTHER GOVERNMENT

## 2018-12-03 PROCEDURE — 97110 THERAPEUTIC EXERCISES: CPT

## 2018-12-03 PROCEDURE — 97140 MANUAL THERAPY 1/> REGIONS: CPT

## 2018-12-03 NOTE — FLOWSHEET NOTE
Other:  [] TA x       [] [facility] Traction (58563)  [] ES(attended) (91925)      [] ES (un) (18438):     Goals:   Patient stated goal: \"regain strength and endurance\"     Therapist goals for Patient:   Short Term Goals: To be achieved in: 2 weeks  1. Independent in HEP and progression per patient tolerance, in order to prevent re-injury. 2. Patient will have a decrease in pain to facilitate improvement in movement, function, and ADLs as indicated by Functional Deficits. Long Term Goals: To be achieved in: 12 weeks  1. Disability index score of 35% or less for the LAURIE to assist with reaching prior level of function. 2. Patient will demonstrate increased AROM to WNL, good LS mobility, good hip ROM to allow for proper joint functioning as indicated by patients Functional Deficits. 3. Patient will demonstrate an increase in Strength to good proximal hip and core activation to allow for proper functional mobility as indicated by patients Functional Deficits. 4. Patient will return to all functional activities without increased symptoms or restriction. 5. Patient will stand > 15 min with no pain. Progression Towards Functional goals:  [] Patient is progressing as expected towards functional goals listed. [] Progression is slowed due to complexities listed. [] Progression has been slowed due to co-morbidities. [x] Plan just implemented, too soon to assess goals progression  [] Other:     ASSESSMENT: Pt fatigues quickly with TA activation. Unable to tolerate quadruped position d/t heaviness in low back/weakness.      Treatment/Activity Tolerance:  [x] Patient tolerated treatment well [] Patient limited by fatique  [] Patient limited by pain  [] Patient limited by other medical complications  [] Other:     Prognosis: [x] Good [] Fair  [] Poor    Patient Requires Follow-up: [x] Yes  [] No    PLAN: See eval  [] Continue per plan of care [] Alter current plan (see comments)  [x] Plan of care initiated [] Hold pending MD visit [] Discharge    Electronically signed by:  Alpa Jiménez PT

## 2018-12-05 ENCOUNTER — HOSPITAL ENCOUNTER (OUTPATIENT)
Dept: OCCUPATIONAL THERAPY | Age: 43
Setting detail: THERAPIES SERIES
Discharge: HOME | End: 2018-12-05
Payer: OTHER GOVERNMENT

## 2018-12-05 PROCEDURE — 97530 THERAPEUTIC ACTIVITIES: CPT | Performed by: OCCUPATIONAL THERAPIST

## 2018-12-05 PROCEDURE — 97110 THERAPEUTIC EXERCISES: CPT | Performed by: OCCUPATIONAL THERAPIST

## 2018-12-05 NOTE — FLOWSHEET NOTE
(12/11/18)        GOALS:  Short Term Goals: To be achieved in: 2 weeks  1. Independent in HEP and progression per patient tolerance, in order to prevent re-injury. 2. Patient will have a decrease in pain to facilitate improvement in movement, function, and ADLs as indicated by Functional Deficits. Long Term Goals to be achieved in 6  weeks, including patient directed goals to address identified performance deficits:  1) Pt to be independent in graded HEP progression with a good level of effort and compliance. 2) Pt to report a score of 25 % or less on the Quick DASH disability questionnaire for increased performance with carrying, moving, and handling objects. MET 11/131/8  3) Pt will demonstrate increased ROM to wrist ex 60, UD 30 degrees  for improved  performance with lifting and wt bearing tasks. 4) Pt will demonstrate increased strength to L  within 10# of R and wrist/FA strength 5-/5  for improved  performance with lifting  and pouring during house hold tasks. 5) Pt will have a decrease in pain to 2-3/10 to facilitate improvement in  performance with gripping, lifting, and wt bearing     Progression Towards Functional goals:  [x] Patient is progressing as expected towards functional goals listed. [] Progression is slowed due to complexities listed. [] Progression has been slowed due to co-morbidities. [] Plan just implemented, too soon to assess goals progression  [x] Other: Goal 2 MET    ASSESSMENT:  Popping or clicking sensation as well as pain are gradually improving. Reassess next visit to determine needs. Treatment/Activity Tolerance:  [x] Patient tolerated treatment well [] Patient limited by fatigue  [] Patient limited by pain  [] Patient limited by other medical complications  [] Other:     Prognosis: [x] Good [] Fair  [] Poor    Patient Requires Follow-up: [x] Yes  [] No    PLAN: Progress ROM and strengthening as tolerated.   [x] Continue per plan of care [x] Alter current plan

## 2018-12-07 ENCOUNTER — HOSPITAL ENCOUNTER (OUTPATIENT)
Dept: PHYSICAL THERAPY | Age: 43
Setting detail: THERAPIES SERIES
Discharge: HOME | End: 2018-12-07
Payer: OTHER GOVERNMENT

## 2018-12-07 PROCEDURE — 97110 THERAPEUTIC EXERCISES: CPT

## 2018-12-07 PROCEDURE — 97112 NEUROMUSCULAR REEDUCATION: CPT

## 2018-12-07 PROCEDURE — 97140 MANUAL THERAPY 1/> REGIONS: CPT

## 2018-12-11 ENCOUNTER — HOSPITAL ENCOUNTER (OUTPATIENT)
Dept: PHYSICAL THERAPY | Age: 43
Setting detail: THERAPIES SERIES
Discharge: HOME | End: 2018-12-11
Payer: OTHER GOVERNMENT

## 2018-12-11 PROCEDURE — 97110 THERAPEUTIC EXERCISES: CPT

## 2018-12-11 PROCEDURE — 97140 MANUAL THERAPY 1/> REGIONS: CPT

## 2018-12-11 PROCEDURE — 97112 NEUROMUSCULAR REEDUCATION: CPT

## 2018-12-11 NOTE — FLOWSHEET NOTE
strengthening, flexibility, endurance, ROM  for improvements in proximal hip and core control with self care, mobility, lifting and ambulation.  [] (96279) Provided verbal/tactile cueing for activities related to improving balance, coordination, kinesthetic sense, posture, motor skill, proprioception  to assist with core control in self care, mobility, lifting, and ambulation. Therapeutic Activities:    [] (85315 or 33522) Provided verbal/tactile cueing for activities related to improving balance, coordination, kinesthetic sense, posture, motor skill, proprioception and motor activation to allow for proper function  with self care and ADLs  [] (22516) Provided training and instruction to the patient for proper core and proximal hip recruitment and positioning with ambulation re-education     Home Exercise Program:    [x] (66921) Reviewed/Progressed HEP activities related to strengthening, flexibility, endurance, ROM of core, proximal hip and LE for functional self-care, mobility, lifting and ambulation   [] (62167) Reviewed/Progressed HEP activities related to improving balance, coordination, kinesthetic sense, posture, motor skill, proprioception of core, proximal hip and LE for self care, mobility, lifting, and ambulation      Manual Treatments:  PROM / STM / Oscillations-Mobs:  G-I, II, III, IV (PA's, Inf., Post.)  [x] (12271) Provided manual therapy to mobilize proximal hip and LS spine soft tissue/joints for the purpose of modulating pain, promoting relaxation,  increasing ROM, reducing/eliminating soft tissue swelling/inflammation/restriction, improving soft tissue extensibility and allowing for proper ROM for normal function with self care, mobility, lifting and ambulation.      Modalities:       Charges:  Timed Code Treatment Minutes: 55   Total Treatment Minutes: 55     [] EVAL  [x] DU(55664) x  2   [] IONTO  [x] NMR (18894) x  1   [] VASO  [x] Manual (02531) x  1    [] Other:  [] TA x       [] [facility] Traction (11771)  [] ES(attended) (01082)      [] ES (un) (17307):     Goals:   Patient stated goal: \"regain strength and endurance\"     Therapist goals for Patient:   Short Term Goals: To be achieved in: 2 weeks  1. Independent in HEP and progression per patient tolerance, in order to prevent re-injury. 2. Patient will have a decrease in pain to facilitate improvement in movement, function, and ADLs as indicated by Functional Deficits. Long Term Goals: To be achieved in: 12 weeks  1. Disability index score of 35% or less for the LAURIE to assist with reaching prior level of function. 2. Patient will demonstrate increased AROM to WNL, good LS mobility, good hip ROM to allow for proper joint functioning as indicated by patients Functional Deficits. 3. Patient will demonstrate an increase in Strength to good proximal hip and core activation to allow for proper functional mobility as indicated by patients Functional Deficits. 4. Patient will return to all functional activities without increased symptoms or restriction. 5. Patient will stand > 15 min with no pain. Progression Towards Functional goals:  [] Patient is progressing as expected towards functional goals listed. [] Progression is slowed due to complexities listed. [] Progression has been slowed due to co-morbidities. [x] Plan just implemented, too soon to assess goals progression  [] Other:     ASSESSMENT: Pt able to tolerate treatment progressions    Treatment/Activity Tolerance:  [x] Patient tolerated treatment well [] Patient limited by fatique  [] Patient limited by pain  [] Patient limited by other medical complications  [] Other:     Prognosis: [x] Good [] Fair  [] Poor    Patient Requires Follow-up: [x] Yes  [] No    PLAN: Progress lumbar stability and LE strength in order to safely perform ADLs without restriction.    [] Continue per plan of care [] Alter current plan (see comments)  [x] Plan of care initiated [] Hold pending MD

## 2018-12-14 ENCOUNTER — HOSPITAL ENCOUNTER (OUTPATIENT)
Dept: PHYSICAL THERAPY | Age: 43
Setting detail: THERAPIES SERIES
Discharge: HOME | End: 2018-12-14
Payer: OTHER GOVERNMENT

## 2018-12-14 PROCEDURE — 97110 THERAPEUTIC EXERCISES: CPT

## 2018-12-14 PROCEDURE — 97140 MANUAL THERAPY 1/> REGIONS: CPT

## 2018-12-14 PROCEDURE — 97112 NEUROMUSCULAR REEDUCATION: CPT

## 2018-12-14 NOTE — FLOWSHEET NOTE
[facility]    Physical Therapy Daily Treatment Note  Date:  2018    Patient Name:  Judith Lopez    :  1975  MRN: [id number]  Restrictions/Precautions:    Medical/Treatment Diagnosis Information:  Diagnosis: Z98.1 (Arthrodesis   Treatment Diagnosis: low back pain, LE weakness   Insurance/Certification information:  PT Insurance Information: /no limit/no deductible   Physician Information:  Referring Practitioner: Dr. Bijal Brumfield of care signed (Y/N):     Date of Patient follow up with Physician:     G-Code (if applicable):      Date G-Code Applied:         Progress Note: []  Yes  []  No  Next due by: Visit #10      Latex Allergy:  [x]NO      []YES  Preferred Language for Healthcare:   [x]English       []other:    Visit # Insurance Allowable   4 No limit      Pain level:  3/10     SUBJECTIVE: Feels like her strength and endurance continues to improve.       OBJECTIVE: See eval  Observation:   Test measurements:      RESTRICTIONS/PRECAUTIONS: L1-L2 lumbar sx DOS 18    Exercises/Interventions:     Therapeutic Ex Wt / Resistance sets/sec reps notes   Bike   5'     Pelvic tilts   2 10 12-6, 3-9   SL hip abd/clam  1# 2 x10    bridges  2 10    Quadruped pelvic tilts   2 15s    Standing glute med activation   2 10    Sit to stand   2 8 High low    Manual Intervention        Prone PA       GISTM/STM  15 min  B lumbar paraspinals    Lumbar Manip       SI Manip       Hip belt mobs       Hip LA distraction              NMR re-education        TrA Re-ed activation       2# 10\"  10''  10''  10'' 10  10  10  10 supine   Towel pull  Marches  MB circles   Glute Max re-ed activation  10\" 10 Prone    Hip add ball squeeze w/ kegel  10\" 10    SLS  3 30s Half foam R LE       Therapeutic Exercise and NMR EXR  [x] (30714) Provided verbal/tactile cueing for activities related to strengthening, flexibility, endurance, ROM  for improvements in

## 2018-12-18 ENCOUNTER — HOSPITAL ENCOUNTER (OUTPATIENT)
Dept: PHYSICAL THERAPY | Age: 43
Setting detail: THERAPIES SERIES
Discharge: HOME | End: 2018-12-18
Payer: OTHER GOVERNMENT

## 2018-12-18 PROCEDURE — 97110 THERAPEUTIC EXERCISES: CPT

## 2018-12-18 PROCEDURE — 97140 MANUAL THERAPY 1/> REGIONS: CPT

## 2018-12-18 PROCEDURE — 97112 NEUROMUSCULAR REEDUCATION: CPT

## 2018-12-18 NOTE — FLOWSHEET NOTE
Goals:   Patient stated goal: \"regain strength and endurance\"     Therapist goals for Patient:   Short Term Goals: To be achieved in: 2 weeks  1. Independent in HEP and progression per patient tolerance, in order to prevent re-injury. 2. Patient will have a decrease in pain to facilitate improvement in movement, function, and ADLs as indicated by Functional Deficits. Long Term Goals: To be achieved in: 12 weeks  1. Disability index score of 35% or less for the LAURIE to assist with reaching prior level of function. 2. Patient will demonstrate increased AROM to WNL, good LS mobility, good hip ROM to allow for proper joint functioning as indicated by patients Functional Deficits. 3. Patient will demonstrate an increase in Strength to good proximal hip and core activation to allow for proper functional mobility as indicated by patients Functional Deficits. 4. Patient will return to all functional activities without increased symptoms or restriction. 5. Patient will stand > 15 min with no pain. Progression Towards Functional goals:  [] Patient is progressing as expected towards functional goals listed. [] Progression is slowed due to complexities listed. [] Progression has been slowed due to co-morbidities. [x] Plan just implemented, too soon to assess goals progression  [] Other:     ASSESSMENT: Pt able to tolerate treatment progressions    Treatment/Activity Tolerance:  [x] Patient tolerated treatment well [] Patient limited by fatique  [] Patient limited by pain  [] Patient limited by other medical complications  [] Other:     Prognosis: [x] Good [] Fair  [] Poor    Patient Requires Follow-up: [x] Yes  [] No    PLAN: Progress lumbar stability and LE strength in order to safely perform ADLs without restriction. [] Continue per plan of care [] Alter current plan (see comments)  [x] Plan of care initiated [] Hold pending MD visit [] Discharge    Electronically signed by:  Alpa Jiménez PT

## 2018-12-19 ENCOUNTER — HOSPITAL ENCOUNTER (OUTPATIENT)
Dept: OCCUPATIONAL THERAPY | Age: 43
Setting detail: THERAPIES SERIES
Discharge: HOME | End: 2018-12-19
Payer: OTHER GOVERNMENT

## 2018-12-19 PROCEDURE — G8986 CARRY D/C STATUS: HCPCS | Performed by: OCCUPATIONAL THERAPIST

## 2018-12-19 PROCEDURE — 97110 THERAPEUTIC EXERCISES: CPT | Performed by: OCCUPATIONAL THERAPIST

## 2018-12-19 PROCEDURE — 97530 THERAPEUTIC ACTIVITIES: CPT | Performed by: OCCUPATIONAL THERAPIST

## 2018-12-19 PROCEDURE — G8985 CARRY GOAL STATUS: HCPCS | Performed by: OCCUPATIONAL THERAPIST

## 2018-12-19 NOTE — FLOWSHEET NOTE
[facility] [address]  Phone [phone]      Fax [phone]    Hand Therapy Daily Treatment Note  Date:  2018    Patient: Jonathan Patel   : 1975   MRN: [id number]  Referring Physician: Referring Practitioner: Mahendra Saldaña       Medical Diagnosis Information:  Diagnosis: L distal radius fracture  S52.592. A      Treatment Diagnosis: L wrist /forearm pain   M25.532   N55.008                                         Insurance information: OT Insurance Information: Human   Date of Injury: 18  Date of Surgery: NA      Visit # Insurance Allowable   8 Med necessity     Date of Patient follow up with Physician: 18    G-Codes:  OT G-codes  Functional Assessment Tool Used: Quick DASH  Score:   18 5% ( initial  64%)  Functional Limitation: Carrying, moving and handling objects  Carrying, Moving and Handling Objects Current Status (): At least 60 percent but less than 80 percent impaired, limited or restricted  Carrying, Moving and Handling Objects Goal Status (): At least 20 percent but less than 40 percent impaired, limited or restricted    Progress Note: []  Yes  [x]  No  Next due by: Visit #10      Latex Allergy:  [x]NO      []YES    Preferred Language for Healthcare:   [x]English       []other:    Pain level:  5/10 With sup and wrist ext    Current : 0-1/10 with pain meds     SUBJECTIVE: Not going back to work until after the new year due to back. Has had minimal clicking in ulnar wrist.  Feels she is doing well    Background/Relevant Medical & Therapy History:   History of Injury/ Mechanism of Injury: 18  Jumped [place] into [facility] [place] and landed wrong resulting in lumbar spline fracture requiring fusion and L wrist injury.  She was initially treated in [address] and then had acute rehab in [place].   Still swollen, no wt (81565)  [] Other:         Frequency/Duration:  1-2days per week for 6 weeks         GOALS:  Short Term Goals: To be achieved in: 2 weeks  1. Independent in HEP and progression per patient tolerance, in order to prevent re-injury. 2. Patient will have a decrease in pain to facilitate improvement in movement, function, and ADLs as indicated by Functional Deficits. Long Term Goals to be achieved in 6  weeks, including patient directed goals to address identified performance deficits:  1) Pt to be independent in graded HEP progression with a good level of effort and compliance. MET  2) Pt to report a score of 25 % or less on the Quick DASH disability questionnaire for increased performance with carrying, moving, and handling objects. MET 11/131/8  3) Pt will demonstrate increased ROM to wrist ex 60, UD 30 degrees  for improved  performance with lifting and wt bearing tasks. MET  4) Pt will demonstrate increased strength to L  within 10# of R and wrist/FA strength 5-/5  for improved  performance with lifting  and pouring during house hold tasks. MET  5) Pt will have a decrease in pain to 2-3/10 to facilitate improvement in  performance with gripping, lifting, and wt bearingMET     Progression Towards Functional goals:  [x] Patient is progressing as expected towards functional goals listed. [] Progression is slowed due to complexities listed. [] Progression has been slowed due to co-morbidities. [] Plan just implemented, too soon to assess goals progression  [x] Other:All goals met    ASSESSMENT:  All goals met. Pt. Comfortable with status    Treatment/Activity Tolerance:  [x] Patient tolerated treatment well [] Patient limited by fatigue  [] Patient limited by pain  [] Patient limited by other medical complications  [] Other:     Prognosis: [x] Good [] Fair  [] Poor    Patient Requires Follow-up: [] Yes  [x] No    PLAN: Progress ROM and strengthening as tolerated.   [] Continue per plan of care [] [place]

## 2018-12-19 NOTE — FLOWSHEET NOTE
Exercise Program:    [x] (74862) Reviewed/Progressed HEP activities related to strengthening, flexibility, endurance, ROM of scapular, scapulothoracic and UE control with self care, reaching, carrying, lifting, house/yardwork, driving/computer work  [] (92115) Reviewed/Progressed HEP activities related to improving balance, coordination, kinesthetic sense, posture, motor skill, proprioception of scapular, scapulothoracic and UE control with self care, reaching, carrying, lifting, house/yardwork, driving/computer work    [x] Comments:added sup isometrics and light wrist ext/flex    Manual Treatments:  PROM / STM / Oscillations-Mobs:  G-I, II, III, IV (PA's, Inf., Post.)  [] (74303) Provided manual therapy to mobilize soft tissue/joints of cervical/CT, scapular GHJ and UE for the purpose of modulating pain, promoting relaxation,  increasing ROM, reducing/eliminating soft tissue swelling/inflammation/restriction, improving soft tissue extensibility and allowing for proper ROM for normal function with self care, reaching, carrying, lifting, house/yardwork, driving/computer work  [] Comments:    ADL Training:  []  (33250) Provided self-care/home management training related to activities of daily living and compensatory training, and/or use of adaptive equipment   [] Comments:     Splinting:  [] Fabrication of:   [] (20768) Checkout for orthotic/prosthetic use, established patient   [] (77799) Orthotic management and training (fitting and assessment)  [] Comments:    Charges:  Timed Code Treatment Minutes: 55   Total Treatment Minutes: 65     [] EVAL (LOW) 62487   [] OT Re-eval (75992)  [] EVAL (MOD) 89213   [] EVAL (HIGH) 23094       [x] Rj (88906) x  3   [] UCVVV(12155)  [] NMR (50487) x      [] Estim (attended) (34644)   [] Manual ([id number]) x       [] US (21849)  [x] TA (42066) x  1   [] Paraffin (15916)  [] ADL  ([phone] [id number]) x     [] Splint/L code:    [] Estim (unattended) (42708)  [] Other:         Frequency/Duration: her back  [] Plan of care initiated [] Hold pending MD visit [x] Discharge all goals met    Electronically signed by:  [id number] [facility] OTR/L [place] [id number]

## 2018-12-21 ENCOUNTER — HOSPITAL ENCOUNTER (OUTPATIENT)
Dept: PHYSICAL THERAPY | Age: 43
Setting detail: THERAPIES SERIES
Discharge: HOME | End: 2018-12-21
Payer: OTHER GOVERNMENT

## 2018-12-21 PROCEDURE — 97110 THERAPEUTIC EXERCISES: CPT

## 2018-12-21 PROCEDURE — 97140 MANUAL THERAPY 1/> REGIONS: CPT

## 2018-12-21 NOTE — FLOWSHEET NOTE
EXR  [x] (00092) Provided verbal/tactile cueing for activities related to strengthening, flexibility, endurance, ROM  for improvements in proximal hip and core control with self care, mobility, lifting and ambulation.  [] (82466) Provided verbal/tactile cueing for activities related to improving balance, coordination, kinesthetic sense, posture, motor skill, proprioception  to assist with core control in self care, mobility, lifting, and ambulation. Therapeutic Activities:    [] (45770 or 63414) Provided verbal/tactile cueing for activities related to improving balance, coordination, kinesthetic sense, posture, motor skill, proprioception and motor activation to allow for proper function  with self care and ADLs  [] (66262) Provided training and instruction to the patient for proper core and proximal hip recruitment and positioning with ambulation re-education     Home Exercise Program:    [x] (32842) Reviewed/Progressed HEP activities related to strengthening, flexibility, endurance, ROM of core, proximal hip and LE for functional self-care, mobility, lifting and ambulation   [] (78802) Reviewed/Progressed HEP activities related to improving balance, coordination, kinesthetic sense, posture, motor skill, proprioception of core, proximal hip and LE for self care, mobility, lifting, and ambulation      Manual Treatments:  PROM / STM / Oscillations-Mobs:  G-I, II, III, IV (PA's, Inf., Post.)  [x] (31811) Provided manual therapy to mobilize proximal hip and LS spine soft tissue/joints for the purpose of modulating pain, promoting relaxation,  increasing ROM, reducing/eliminating soft tissue swelling/inflammation/restriction, improving soft tissue extensibility and allowing for proper ROM for normal function with self care, mobility, lifting and ambulation.      Modalities:       Charges:  Timed Code Treatment Minutes: 40   Total Treatment Minutes: 40     [] EVAL  [x] UQ(59056) x  1   [] IONTO  [] NMR (36334) x [] VASO  [x] Manual (91530) x  2    [] Other:  [] TA x       [] [employer] Traction (93355)  [] ES(attended) (58892)      [] ES (un) (63573):     Goals:   Patient stated goal: \"regain strength and endurance\"     Therapist goals for Patient:   Short Term Goals: To be achieved in: 2 weeks  1. Independent in HEP and progression per patient tolerance, in order to prevent re-injury. 2. Patient will have a decrease in pain to facilitate improvement in movement, function, and ADLs as indicated by Functional Deficits. Long Term Goals: To be achieved in: 12 weeks  1. Disability index score of 35% or less for the LAURIE to assist with reaching prior level of function. 2. Patient will demonstrate increased AROM to WNL, good LS mobility, good hip ROM to allow for proper joint functioning as indicated by patients Functional Deficits. 3. Patient will demonstrate an increase in Strength to good proximal hip and core activation to allow for proper functional mobility as indicated by patients Functional Deficits. 4. Patient will return to all functional activities without increased symptoms or restriction. 5. Patient will stand > 15 min with no pain. Progression Towards Functional goals:  [] Patient is progressing as expected towards functional goals listed. [] Progression is slowed due to complexities listed. [] Progression has been slowed due to co-morbidities. [x] Plan just implemented, too soon to assess goals progression  [] Other:     ASSESSMENT: Increased tone noted in B lumbar paraspinals this date. Treatment/Activity Tolerance:  [x] Patient tolerated treatment well [] Patient limited by fatique  [] Patient limited by pain  [] Patient limited by other medical complications  [] Other:     Prognosis: [x] Good [] Fair  [] Poor    Patient Requires Follow-up: [x] Yes  [] No    PLAN: Progress lumbar stability and LE strength in order to safely perform ADLs without restriction.    [] Continue per plan of care []

## 2018-12-27 ENCOUNTER — HOSPITAL ENCOUNTER (OUTPATIENT)
Dept: PHYSICAL THERAPY | Age: 43
Setting detail: THERAPIES SERIES
Discharge: HOME | End: 2018-12-27
Payer: OTHER GOVERNMENT

## 2019-01-02 ENCOUNTER — HOSPITAL ENCOUNTER (OUTPATIENT)
Dept: PHYSICAL THERAPY | Age: 44
Setting detail: THERAPIES SERIES
Discharge: HOME | End: 2019-01-02
Payer: OTHER GOVERNMENT

## 2019-01-02 ENCOUNTER — OFFICE VISIT (OUTPATIENT)
Dept: ORTHOPEDIC SURGERY | Age: 44
End: 2019-01-02
Payer: OTHER GOVERNMENT

## 2019-01-02 DIAGNOSIS — S52.592A OTHER CLOSED FRACTURE OF DISTAL END OF LEFT RADIUS, INITIAL ENCOUNTER: ICD-10-CM

## 2019-01-02 DIAGNOSIS — S69.82XA INJURY OF TRIANGULAR FIBROCARTILAGE COMPLEX (TFCC) OF LEFT WRIST, INITIAL ENCOUNTER: ICD-10-CM

## 2019-01-02 DIAGNOSIS — M77.8 LEFT WRIST TENDONITIS: ICD-10-CM

## 2019-01-02 PROCEDURE — 97110 THERAPEUTIC EXERCISES: CPT

## 2019-01-02 PROCEDURE — 97140 MANUAL THERAPY 1/> REGIONS: CPT

## 2019-01-02 PROCEDURE — 99212 OFFICE O/P EST SF 10 MIN: CPT | Performed by: ORTHOPAEDIC SURGERY

## 2019-01-04 ENCOUNTER — APPOINTMENT (OUTPATIENT)
Dept: PHYSICAL THERAPY | Age: 44
End: 2019-01-04
Payer: OTHER GOVERNMENT

## 2019-01-11 ENCOUNTER — HOSPITAL ENCOUNTER (OUTPATIENT)
Dept: PHYSICAL THERAPY | Age: 44
Setting detail: THERAPIES SERIES
End: 2019-01-11
Payer: OTHER GOVERNMENT

## 2019-01-16 ENCOUNTER — HOSPITAL ENCOUNTER (OUTPATIENT)
Dept: PHYSICAL THERAPY | Age: 44
Setting detail: THERAPIES SERIES
Discharge: HOME | End: 2019-01-16
Payer: OTHER GOVERNMENT

## 2019-01-16 PROCEDURE — 97140 MANUAL THERAPY 1/> REGIONS: CPT

## 2019-01-16 PROCEDURE — 97110 THERAPEUTIC EXERCISES: CPT

## 2019-01-16 PROCEDURE — 97112 NEUROMUSCULAR REEDUCATION: CPT

## 2019-01-23 ENCOUNTER — HOSPITAL ENCOUNTER (OUTPATIENT)
Dept: PHYSICAL THERAPY | Age: 44
Setting detail: THERAPIES SERIES
Discharge: HOME | End: 2019-01-23
Payer: OTHER GOVERNMENT

## 2019-01-23 PROCEDURE — 97110 THERAPEUTIC EXERCISES: CPT

## 2019-01-23 PROCEDURE — 97112 NEUROMUSCULAR REEDUCATION: CPT

## 2019-01-23 PROCEDURE — 97140 MANUAL THERAPY 1/> REGIONS: CPT

## 2019-01-25 ENCOUNTER — APPOINTMENT (OUTPATIENT)
Dept: PHYSICAL THERAPY | Age: 44
End: 2019-01-25
Payer: OTHER GOVERNMENT

## 2019-06-05 ENCOUNTER — OFFICE VISIT (OUTPATIENT)
Dept: INTERNAL MEDICINE CLINIC | Age: 44
End: 2019-06-05
Payer: OTHER GOVERNMENT

## 2019-06-05 VITALS
DIASTOLIC BLOOD PRESSURE: 92 MMHG | HEIGHT: 62 IN | WEIGHT: 184.8 LBS | HEART RATE: 80 BPM | SYSTOLIC BLOOD PRESSURE: 136 MMHG | BODY MASS INDEX: 34.01 KG/M2

## 2019-06-05 DIAGNOSIS — F33.41 RECURRENT MAJOR DEPRESSIVE DISORDER, IN PARTIAL REMISSION (HCC): ICD-10-CM

## 2019-06-05 DIAGNOSIS — Z13.31 POSITIVE DEPRESSION SCREENING: ICD-10-CM

## 2019-06-05 DIAGNOSIS — R53.83 FATIGUE, UNSPECIFIED TYPE: ICD-10-CM

## 2019-06-05 DIAGNOSIS — Z00.00 HEALTHCARE MAINTENANCE: ICD-10-CM

## 2019-06-05 DIAGNOSIS — I10 ESSENTIAL HYPERTENSION: Primary | ICD-10-CM

## 2019-06-05 DIAGNOSIS — R63.5 WEIGHT GAIN: ICD-10-CM

## 2019-06-05 PROBLEM — F32.4 MAJOR DEPRESSIVE DISORDER IN PARTIAL REMISSION (HCC): Status: ACTIVE | Noted: 2019-06-05

## 2019-06-05 LAB
A/G RATIO: 1.8 (ref 1.1–2.2)
ALBUMIN SERPL-MCNC: 4.4 G/DL (ref 3.4–5)
ALP BLD-CCNC: 84 U/L (ref 40–129)
ALT SERPL-CCNC: 35 U/L (ref 10–40)
ANION GAP SERPL CALCULATED.3IONS-SCNC: 14 MMOL/L (ref 3–16)
AST SERPL-CCNC: 29 U/L (ref 15–37)
BASOPHILS ABSOLUTE: 0 K/UL (ref 0–0.2)
BASOPHILS RELATIVE PERCENT: 0.8 %
BILIRUB SERPL-MCNC: 0.5 MG/DL (ref 0–1)
BUN BLDV-MCNC: 12 MG/DL (ref 7–20)
CALCIUM SERPL-MCNC: 9.1 MG/DL (ref 8.3–10.6)
CHLORIDE BLD-SCNC: 101 MMOL/L (ref 99–110)
CHOLESTEROL, TOTAL: 182 MG/DL (ref 0–199)
CO2: 26 MMOL/L (ref 21–32)
CREAT SERPL-MCNC: 0.6 MG/DL (ref 0.6–1.1)
EOSINOPHILS ABSOLUTE: 0.1 K/UL (ref 0–0.6)
EOSINOPHILS RELATIVE PERCENT: 1.8 %
FOLATE: >20 NG/ML (ref 4.78–24.2)
GFR AFRICAN AMERICAN: >60
GFR NON-AFRICAN AMERICAN: >60
GLOBULIN: 2.4 G/DL
GLUCOSE BLD-MCNC: 86 MG/DL (ref 70–99)
HCT VFR BLD CALC: 40.9 % (ref 36–48)
HDLC SERPL-MCNC: 54 MG/DL (ref 40–60)
HEMOGLOBIN: 13.3 G/DL (ref 12–16)
LDL CHOLESTEROL CALCULATED: 97 MG/DL
LYMPHOCYTES ABSOLUTE: 1.6 K/UL (ref 1–5.1)
LYMPHOCYTES RELATIVE PERCENT: 24.6 %
MCH RBC QN AUTO: 30.1 PG (ref 26–34)
MCHC RBC AUTO-ENTMCNC: 32.4 G/DL (ref 31–36)
MCV RBC AUTO: 93 FL (ref 80–100)
MONOCYTES ABSOLUTE: 0.4 K/UL (ref 0–1.3)
MONOCYTES RELATIVE PERCENT: 6.8 %
NEUTROPHILS ABSOLUTE: 4.3 K/UL (ref 1.7–7.7)
NEUTROPHILS RELATIVE PERCENT: 66 %
PDW BLD-RTO: 13.3 % (ref 12.4–15.4)
PLATELET # BLD: 260 K/UL (ref 135–450)
PMV BLD AUTO: 9.4 FL (ref 5–10.5)
POTASSIUM SERPL-SCNC: 4.3 MMOL/L (ref 3.5–5.1)
RBC # BLD: 4.4 M/UL (ref 4–5.2)
SODIUM BLD-SCNC: 141 MMOL/L (ref 136–145)
TOTAL PROTEIN: 6.8 G/DL (ref 6.4–8.2)
TRIGL SERPL-MCNC: 154 MG/DL (ref 0–150)
TSH SERPL DL<=0.05 MIU/L-ACNC: 2.65 UIU/ML (ref 0.27–4.2)
VITAMIN B-12: 419 PG/ML (ref 211–911)
VITAMIN D 25-HYDROXY: 27.4 NG/ML
VLDLC SERPL CALC-MCNC: 31 MG/DL
WBC # BLD: 6.5 K/UL (ref 4–11)

## 2019-06-05 PROCEDURE — G0444 DEPRESSION SCREEN ANNUAL: HCPCS | Performed by: NURSE PRACTITIONER

## 2019-06-05 PROCEDURE — G8431 POS CLIN DEPRES SCRN F/U DOC: HCPCS | Performed by: NURSE PRACTITIONER

## 2019-06-05 PROCEDURE — 99214 OFFICE O/P EST MOD 30 MIN: CPT | Performed by: NURSE PRACTITIONER

## 2019-06-05 RX ORDER — OXYCODONE HYDROCHLORIDE 5 MG/1
5 TABLET ORAL 2 TIMES DAILY
COMMUNITY
End: 2019-06-26

## 2019-06-05 ASSESSMENT — PATIENT HEALTH QUESTIONNAIRE - PHQ9
1. LITTLE INTEREST OR PLEASURE IN DOING THINGS: 2
5. POOR APPETITE OR OVEREATING: 3
4. FEELING TIRED OR HAVING LITTLE ENERGY: 3
6. FEELING BAD ABOUT YOURSELF - OR THAT YOU ARE A FAILURE OR HAVE LET YOURSELF OR YOUR FAMILY DOWN: 3
9. THOUGHTS THAT YOU WOULD BE BETTER OFF DEAD, OR OF HURTING YOURSELF: 1
SUM OF ALL RESPONSES TO PHQ QUESTIONS 1-9: 19
3. TROUBLE FALLING OR STAYING ASLEEP: 3
2. FEELING DOWN, DEPRESSED OR HOPELESS: 2
10. IF YOU CHECKED OFF ANY PROBLEMS, HOW DIFFICULT HAVE THESE PROBLEMS MADE IT FOR YOU TO DO YOUR WORK, TAKE CARE OF THINGS AT HOME, OR GET ALONG WITH OTHER PEOPLE: 2
SUM OF ALL RESPONSES TO PHQ9 QUESTIONS 1 & 2: 4
8. MOVING OR SPEAKING SO SLOWLY THAT OTHER PEOPLE COULD HAVE NOTICED. OR THE OPPOSITE, BEING SO FIGETY OR RESTLESS THAT YOU HAVE BEEN MOVING AROUND A LOT MORE THAN USUAL: 0
7. TROUBLE CONCENTRATING ON THINGS, SUCH AS READING THE NEWSPAPER OR WATCHING TELEVISION: 2
SUM OF ALL RESPONSES TO PHQ QUESTIONS 1-9: 19
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
## 2019-06-08 LAB — METHYLMALONIC ACID: 0.17 UMOL/L (ref 0–0.4)

## 2019-06-26 ENCOUNTER — TELEPHONE (OUTPATIENT)
Dept: RHEUMATOLOGY | Age: 44
End: 2019-06-26

## 2019-06-26 NOTE — TELEPHONE ENCOUNTER
Pt want to start medication that was discussed at  Her last visit Pt is not aware of the name of the medication     Please use pharmacy ( [facility] ) [facility]
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
## 2019-06-26 NOTE — TELEPHONE ENCOUNTER
I called and spoke with pt. It is for the Contrave. She is no longer taking the pain medication. She states her BP has still been 130-140/80-90 like in the office. She attributes this to her weight gain. She believes if she can lose the weight her BP will follow.

## 2019-06-27 ENCOUNTER — TELEPHONE (OUTPATIENT)
Dept: RHEUMATOLOGY | Age: 44
End: 2019-06-27

## 2019-07-01 ENCOUNTER — TELEPHONE (OUTPATIENT)
Dept: ORTHOPEDIC SURGERY | Age: 44
End: 2019-07-01

## 2019-07-17 ENCOUNTER — OFFICE VISIT (OUTPATIENT)
Dept: INTERNAL MEDICINE CLINIC | Age: 44
End: 2019-07-17
Payer: OTHER GOVERNMENT

## 2019-07-17 VITALS
HEART RATE: 80 BPM | BODY MASS INDEX: 34.3 KG/M2 | HEIGHT: 62 IN | SYSTOLIC BLOOD PRESSURE: 148 MMHG | DIASTOLIC BLOOD PRESSURE: 104 MMHG | WEIGHT: 186.4 LBS

## 2019-07-17 DIAGNOSIS — F33.41 RECURRENT MAJOR DEPRESSIVE DISORDER, IN PARTIAL REMISSION (HCC): ICD-10-CM

## 2019-07-17 DIAGNOSIS — T07.XXXA MULTIPLE TRAUMA: ICD-10-CM

## 2019-07-17 DIAGNOSIS — I10 ESSENTIAL HYPERTENSION: Primary | ICD-10-CM

## 2019-07-17 PROCEDURE — 99214 OFFICE O/P EST MOD 30 MIN: CPT | Performed by: NURSE PRACTITIONER

## 2019-07-17 RX ORDER — DULOXETIN HYDROCHLORIDE 60 MG/1
120 CAPSULE, DELAYED RELEASE ORAL DAILY
Qty: 60 CAPSULE | Refills: 1 | Status: SHIPPED | OUTPATIENT
Start: 2019-07-17 | End: 2019-10-09 | Stop reason: SDUPTHER

## 2019-07-17 RX ORDER — OXYCODONE HYDROCHLORIDE 5 MG/1
5 TABLET ORAL EVERY 8 HOURS PRN
Status: ON HOLD | COMMUNITY
End: 2019-11-11 | Stop reason: HOSPADM

## 2019-07-17 RX ORDER — AMLODIPINE BESYLATE 5 MG/1
5 TABLET ORAL DAILY
Qty: 30 TABLET | Refills: 1 | Status: SHIPPED | OUTPATIENT
Start: 2019-07-17 | End: 2019-08-14 | Stop reason: SDUPTHER

## 2019-07-23 ENCOUNTER — TELEPHONE (OUTPATIENT)
Dept: INTERNAL MEDICINE CLINIC | Age: 44
End: 2019-07-23

## 2019-07-24 ENCOUNTER — OFFICE VISIT (OUTPATIENT)
Dept: INTERNAL MEDICINE CLINIC | Age: 44
End: 2019-07-24
Payer: OTHER GOVERNMENT

## 2019-07-24 VITALS
SYSTOLIC BLOOD PRESSURE: 146 MMHG | HEART RATE: 92 BPM | WEIGHT: 187 LBS | HEIGHT: 62 IN | DIASTOLIC BLOOD PRESSURE: 98 MMHG | BODY MASS INDEX: 34.41 KG/M2

## 2019-07-24 DIAGNOSIS — I10 ESSENTIAL HYPERTENSION: Primary | ICD-10-CM

## 2019-07-24 PROCEDURE — 99213 OFFICE O/P EST LOW 20 MIN: CPT | Performed by: NURSE PRACTITIONER

## 2019-07-24 RX ORDER — HYDROCHLOROTHIAZIDE 25 MG/1
TABLET ORAL
Qty: 30 TABLET | Refills: 3 | Status: CANCELLED | OUTPATIENT
Start: 2019-07-24

## 2019-07-24 RX ORDER — LOSARTAN POTASSIUM AND HYDROCHLOROTHIAZIDE 12.5; 5 MG/1; MG/1
1 TABLET ORAL DAILY
Qty: 30 TABLET | Refills: 5 | Status: SHIPPED | OUTPATIENT
Start: 2019-07-24 | End: 2019-08-14 | Stop reason: SDUPTHER

## 2019-07-24 NOTE — PROGRESS NOTES
has a normal mood and affect. Thought content normal.     Assessment/Plan:  1. Essential hypertension  Uncontrolled. Add in lostartan HCTZ   Monitor home BP and bring in log to next appt   Diet and exercise   - losartan-hydrochlorothiazide (HYZAAR) 50-12.5 MG per tablet; Take 1 tablet by mouth daily  Dispense: 30 tablet;  Refill: 5    Follow up in 2 weeks     Electronically signed by NIKOLE Desai CNP on 7/24/2019 at 1:51 PM

## 2019-08-14 ENCOUNTER — OFFICE VISIT (OUTPATIENT)
Dept: INTERNAL MEDICINE CLINIC | Age: 44
End: 2019-08-14
Payer: OTHER GOVERNMENT

## 2019-08-14 VITALS
HEIGHT: 62 IN | SYSTOLIC BLOOD PRESSURE: 142 MMHG | HEART RATE: 80 BPM | WEIGHT: 185.8 LBS | BODY MASS INDEX: 34.19 KG/M2 | DIASTOLIC BLOOD PRESSURE: 88 MMHG

## 2019-08-14 DIAGNOSIS — F33.41 RECURRENT MAJOR DEPRESSIVE DISORDER, IN PARTIAL REMISSION (HCC): ICD-10-CM

## 2019-08-14 DIAGNOSIS — Z13.31 POSITIVE DEPRESSION SCREENING: ICD-10-CM

## 2019-08-14 DIAGNOSIS — I10 ESSENTIAL HYPERTENSION: Primary | ICD-10-CM

## 2019-08-14 DIAGNOSIS — F41.9 ANXIETY: ICD-10-CM

## 2019-08-14 PROCEDURE — 99213 OFFICE O/P EST LOW 20 MIN: CPT | Performed by: NURSE PRACTITIONER

## 2019-08-14 RX ORDER — AMLODIPINE BESYLATE 10 MG/1
10 TABLET ORAL DAILY
Qty: 90 TABLET | Refills: 1 | Status: SHIPPED | OUTPATIENT
Start: 2019-08-14 | End: 2020-02-11

## 2019-08-14 RX ORDER — LOSARTAN POTASSIUM AND HYDROCHLOROTHIAZIDE 12.5; 5 MG/1; MG/1
2 TABLET ORAL DAILY
Qty: 60 TABLET | Refills: 5 | Status: SHIPPED | OUTPATIENT
Start: 2019-08-14 | End: 2019-09-18

## 2019-08-14 RX ORDER — BUPROPION HYDROCHLORIDE 300 MG/1
300 TABLET ORAL EVERY MORNING
Qty: 90 TABLET | Refills: 1 | Status: SHIPPED | OUTPATIENT
Start: 2019-08-14 | End: 2020-03-05

## 2019-08-14 ASSESSMENT — ENCOUNTER SYMPTOMS
APNEA: 0
WHEEZING: 0
COUGH: 0
CHEST TIGHTNESS: 0
SHORTNESS OF BREATH: 0
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
## 2019-09-18 ENCOUNTER — OFFICE VISIT (OUTPATIENT)
Dept: INTERNAL MEDICINE CLINIC | Age: 44
End: 2019-09-18
Payer: OTHER GOVERNMENT

## 2019-09-18 VITALS
HEART RATE: 76 BPM | DIASTOLIC BLOOD PRESSURE: 86 MMHG | SYSTOLIC BLOOD PRESSURE: 130 MMHG | BODY MASS INDEX: 33.49 KG/M2 | WEIGHT: 182 LBS | HEIGHT: 62 IN

## 2019-09-18 DIAGNOSIS — I10 ESSENTIAL HYPERTENSION: Primary | ICD-10-CM

## 2019-09-18 DIAGNOSIS — Z23 NEED FOR INFLUENZA VACCINATION: ICD-10-CM

## 2019-09-18 DIAGNOSIS — E53.8 VITAMIN B12 DEFICIENCY: ICD-10-CM

## 2019-09-18 DIAGNOSIS — I36.1 NON-RHEUMATIC TRICUSPID VALVE INSUFFICIENCY: ICD-10-CM

## 2019-09-18 PROCEDURE — 90471 IMMUNIZATION ADMIN: CPT | Performed by: NURSE PRACTITIONER

## 2019-09-18 PROCEDURE — 99213 OFFICE O/P EST LOW 20 MIN: CPT | Performed by: NURSE PRACTITIONER

## 2019-09-18 PROCEDURE — 90686 IIV4 VACC NO PRSV 0.5 ML IM: CPT | Performed by: NURSE PRACTITIONER

## 2019-09-18 RX ORDER — CYANOCOBALAMIN 1000 UG/ML
1000 INJECTION INTRAMUSCULAR; SUBCUTANEOUS
Qty: 1 ML | Refills: 5 | Status: SHIPPED | OUTPATIENT
Start: 2019-09-18 | End: 2020-03-31

## 2019-09-18 RX ORDER — VALSARTAN AND HYDROCHLOROTHIAZIDE 160; 25 MG/1; MG/1
1 TABLET ORAL DAILY
Qty: 30 TABLET | Refills: 3 | Status: SHIPPED | OUTPATIENT
Start: 2019-09-18 | End: 2020-01-30

## 2019-10-08 DIAGNOSIS — F33.41 RECURRENT MAJOR DEPRESSIVE DISORDER, IN PARTIAL REMISSION (HCC): ICD-10-CM

## 2019-10-09 RX ORDER — DULOXETIN HYDROCHLORIDE 60 MG/1
120 CAPSULE, DELAYED RELEASE ORAL DAILY
Qty: 60 CAPSULE | Refills: 1 | Status: SHIPPED | OUTPATIENT
Start: 2019-10-09 | End: 2019-11-25 | Stop reason: SDUPTHER

## 2019-10-16 ENCOUNTER — OFFICE VISIT (OUTPATIENT)
Dept: INTERNAL MEDICINE CLINIC | Age: 44
End: 2019-10-16
Payer: OTHER GOVERNMENT

## 2019-10-16 VITALS
WEIGHT: 181 LBS | BODY MASS INDEX: 33.31 KG/M2 | SYSTOLIC BLOOD PRESSURE: 124 MMHG | HEIGHT: 62 IN | HEART RATE: 68 BPM | DIASTOLIC BLOOD PRESSURE: 66 MMHG

## 2019-10-16 DIAGNOSIS — F41.9 ANXIETY: ICD-10-CM

## 2019-10-16 DIAGNOSIS — E66.09 CLASS 1 OBESITY DUE TO EXCESS CALORIES WITH SERIOUS COMORBIDITY AND BODY MASS INDEX (BMI) OF 33.0 TO 33.9 IN ADULT: ICD-10-CM

## 2019-10-16 DIAGNOSIS — F33.41 RECURRENT MAJOR DEPRESSIVE DISORDER, IN PARTIAL REMISSION (HCC): Primary | ICD-10-CM

## 2019-10-16 DIAGNOSIS — R63.5 WEIGHT GAIN: ICD-10-CM

## 2019-10-16 PROBLEM — E66.811 CLASS 1 OBESITY DUE TO EXCESS CALORIES WITH SERIOUS COMORBIDITY AND BODY MASS INDEX (BMI) OF 33.0 TO 33.9 IN ADULT: Status: ACTIVE | Noted: 2019-10-16

## 2019-10-16 PROCEDURE — 99214 OFFICE O/P EST MOD 30 MIN: CPT | Performed by: NURSE PRACTITIONER

## 2019-10-17 ENCOUNTER — TELEPHONE (OUTPATIENT)
Dept: INTERNAL MEDICINE CLINIC | Age: 44
End: 2019-10-17

## 2019-10-17 DIAGNOSIS — F41.9 ANXIETY: Primary | ICD-10-CM

## 2019-10-17 RX ORDER — HYDROXYZINE HYDROCHLORIDE 25 MG/1
25 TABLET, FILM COATED ORAL EVERY 8 HOURS PRN
Qty: 30 TABLET | Refills: 0 | Status: SHIPPED | OUTPATIENT
Start: 2019-10-17 | End: 2021-02-05 | Stop reason: SDUPTHER

## 2019-10-18 ENCOUNTER — TELEPHONE (OUTPATIENT)
Dept: RHEUMATOLOGY | Age: 44
End: 2019-10-18

## 2019-10-30 ENCOUNTER — TELEPHONE (OUTPATIENT)
Dept: INTERNAL MEDICINE CLINIC | Age: 44
End: 2019-10-30

## 2019-10-30 ENCOUNTER — OFFICE VISIT (OUTPATIENT)
Dept: PSYCHIATRY | Age: 44
End: 2019-10-30
Payer: OTHER GOVERNMENT

## 2019-10-30 VITALS
DIASTOLIC BLOOD PRESSURE: 79 MMHG | SYSTOLIC BLOOD PRESSURE: 117 MMHG | BODY MASS INDEX: 33.13 KG/M2 | WEIGHT: 180 LBS | HEIGHT: 62 IN

## 2019-10-30 DIAGNOSIS — F33.1 MODERATE EPISODE OF RECURRENT MAJOR DEPRESSIVE DISORDER (HCC): Primary | ICD-10-CM

## 2019-10-30 DIAGNOSIS — F43.9 TRAUMA AND STRESSOR-RELATED DISORDER: ICD-10-CM

## 2019-10-30 PROCEDURE — 99204 OFFICE O/P NEW MOD 45 MIN: CPT | Performed by: PSYCHIATRY & NEUROLOGY

## 2019-10-30 RX ORDER — PRAZOSIN HYDROCHLORIDE 1 MG/1
1 CAPSULE ORAL NIGHTLY
Qty: 30 CAPSULE | Refills: 1 | Status: SHIPPED | OUTPATIENT
Start: 2019-10-30 | End: 2019-12-26

## 2019-11-11 ENCOUNTER — ANESTHESIA (OUTPATIENT)
Dept: OPERATING ROOM | Age: 44
End: 2019-11-11
Payer: OTHER GOVERNMENT

## 2019-11-11 ENCOUNTER — ANESTHESIA EVENT (OUTPATIENT)
Dept: OPERATING ROOM | Age: 44
End: 2019-11-11
Payer: OTHER GOVERNMENT

## 2019-11-11 ENCOUNTER — HOSPITAL ENCOUNTER (OUTPATIENT)
Age: 44
Setting detail: OUTPATIENT SURGERY
Discharge: HOME | End: 2019-11-11
Attending: PODIATRIST | Admitting: PODIATRIST
Payer: OTHER GOVERNMENT

## 2019-11-11 VITALS
SYSTOLIC BLOOD PRESSURE: 86 MMHG | RESPIRATION RATE: 11 BRPM | OXYGEN SATURATION: 93 % | DIASTOLIC BLOOD PRESSURE: 54 MMHG | TEMPERATURE: 96.6 F

## 2019-11-11 VITALS
BODY MASS INDEX: 33.09 KG/M2 | HEIGHT: 62 IN | TEMPERATURE: 98 F | OXYGEN SATURATION: 100 % | SYSTOLIC BLOOD PRESSURE: 112 MMHG | RESPIRATION RATE: 15 BRPM | DIASTOLIC BLOOD PRESSURE: 77 MMHG | HEART RATE: 90 BPM | WEIGHT: 179.8 LBS

## 2019-11-11 DIAGNOSIS — G89.18 POST-OP PAIN: Primary | ICD-10-CM

## 2019-11-11 LAB
ANION GAP SERPL CALCULATED.3IONS-SCNC: 13 MMOL/L (ref 3–16)
BUN BLDV-MCNC: 13 MG/DL (ref 7–20)
CALCIUM SERPL-MCNC: 9.1 MG/DL (ref 8.3–10.6)
CHLORIDE BLD-SCNC: 101 MMOL/L (ref 99–110)
CO2: 26 MMOL/L (ref 21–32)
CREAT SERPL-MCNC: 0.7 MG/DL (ref 0.6–1.1)
GFR AFRICAN AMERICAN: >60
GFR NON-AFRICAN AMERICAN: >60
GLUCOSE BLD-MCNC: 108 MG/DL (ref 70–99)
GLUCOSE BLD-MCNC: 136 MG/DL (ref 70–99)
HCG(URINE) PREGNANCY TEST: NEGATIVE
PERFORMED ON: ABNORMAL
POTASSIUM SERPL-SCNC: 3.5 MMOL/L (ref 3.5–5.1)
SODIUM BLD-SCNC: 140 MMOL/L (ref 136–145)

## 2019-11-11 PROCEDURE — 80048 BASIC METABOLIC PNL TOTAL CA: CPT

## 2019-11-11 PROCEDURE — 7100000001 HC PACU RECOVERY - ADDTL 15 MIN: Performed by: PODIATRIST

## 2019-11-11 PROCEDURE — 6360000002 HC RX W HCPCS: Performed by: NURSE ANESTHETIST, CERTIFIED REGISTERED

## 2019-11-11 PROCEDURE — 3700000000 HC ANESTHESIA ATTENDED CARE: Performed by: PODIATRIST

## 2019-11-11 PROCEDURE — 7100000000 HC PACU RECOVERY - FIRST 15 MIN: Performed by: PODIATRIST

## 2019-11-11 PROCEDURE — 2500000003 HC RX 250 WO HCPCS: Performed by: PODIATRIST

## 2019-11-11 PROCEDURE — 2580000003 HC RX 258: Performed by: PODIATRIST

## 2019-11-11 PROCEDURE — 84703 CHORIONIC GONADOTROPIN ASSAY: CPT

## 2019-11-11 PROCEDURE — 3600000004 HC SURGERY LEVEL 4 BASE: Performed by: PODIATRIST

## 2019-11-11 PROCEDURE — C1713 ANCHOR/SCREW BN/BN,TIS/BN: HCPCS | Performed by: PODIATRIST

## 2019-11-11 PROCEDURE — 6370000000 HC RX 637 (ALT 250 FOR IP): Performed by: ANESTHESIOLOGY

## 2019-11-11 PROCEDURE — C1769 GUIDE WIRE: HCPCS | Performed by: PODIATRIST

## 2019-11-11 PROCEDURE — 6360000002 HC RX W HCPCS: Performed by: PODIATRIST

## 2019-11-11 PROCEDURE — 2709999900 HC NON-CHARGEABLE SUPPLY: Performed by: PODIATRIST

## 2019-11-11 PROCEDURE — 3600000014 HC SURGERY LEVEL 4 ADDTL 15MIN: Performed by: PODIATRIST

## 2019-11-11 PROCEDURE — 2500000003 HC RX 250 WO HCPCS: Performed by: NURSE ANESTHETIST, CERTIFIED REGISTERED

## 2019-11-11 PROCEDURE — 7100000011 HC PHASE II RECOVERY - ADDTL 15 MIN: Performed by: PODIATRIST

## 2019-11-11 PROCEDURE — 7100000010 HC PHASE II RECOVERY - FIRST 15 MIN: Performed by: PODIATRIST

## 2019-11-11 PROCEDURE — 2720000010 HC SURG SUPPLY STERILE: Performed by: PODIATRIST

## 2019-11-11 PROCEDURE — 3700000001 HC ADD 15 MINUTES (ANESTHESIA): Performed by: PODIATRIST

## 2019-11-11 DEVICE — BONE SCREW, FULLY THREADED, T8
Type: IMPLANTABLE DEVICE | Site: FOOT | Status: FUNCTIONAL
Brand: VARIAX

## 2019-11-11 DEVICE — LOCKING SCREW, FULLY THREADED,T8
Type: IMPLANTABLE DEVICE | Site: FOOT | Status: FUNCTIONAL
Brand: VARIAX

## 2019-11-11 DEVICE — OSTEOSYNTHESIS COMPRESSION STAPLE
Type: IMPLANTABLE DEVICE | Site: FOOT | Status: FUNCTIONAL
Brand: EASY CLIP

## 2019-11-11 DEVICE — CANNULATED SCREW
Type: IMPLANTABLE DEVICE | Site: FOOT | Status: FUNCTIONAL
Brand: ASNIS

## 2019-11-11 DEVICE — SLIM Y-PLATE
Type: IMPLANTABLE DEVICE | Site: FOOT | Status: FUNCTIONAL
Brand: VARIAX

## 2019-11-11 RX ORDER — FENTANYL CITRATE 50 UG/ML
25 INJECTION, SOLUTION INTRAMUSCULAR; INTRAVENOUS EVERY 5 MIN PRN
Status: DISCONTINUED | OUTPATIENT
Start: 2019-11-11 | End: 2019-11-11 | Stop reason: HOSPADM

## 2019-11-11 RX ORDER — LIDOCAINE HYDROCHLORIDE 20 MG/ML
INJECTION, SOLUTION EPIDURAL; INFILTRATION; INTRACAUDAL; PERINEURAL PRN
Status: DISCONTINUED | OUTPATIENT
Start: 2019-11-11 | End: 2019-11-11 | Stop reason: SDUPTHER

## 2019-11-11 RX ORDER — CEFAZOLIN SODIUM 2 G/100ML
2 INJECTION, SOLUTION INTRAVENOUS
Status: COMPLETED | OUTPATIENT
Start: 2019-11-11 | End: 2019-11-11

## 2019-11-11 RX ORDER — OXYCODONE AND ACETAMINOPHEN 10; 325 MG/1; MG/1
1 TABLET ORAL ONCE
Status: COMPLETED | OUTPATIENT
Start: 2019-11-11 | End: 2019-11-11

## 2019-11-11 RX ORDER — FENTANYL CITRATE 50 UG/ML
INJECTION, SOLUTION INTRAMUSCULAR; INTRAVENOUS PRN
Status: DISCONTINUED | OUTPATIENT
Start: 2019-11-11 | End: 2019-11-11 | Stop reason: SDUPTHER

## 2019-11-11 RX ORDER — LABETALOL HYDROCHLORIDE 5 MG/ML
5 INJECTION, SOLUTION INTRAVENOUS EVERY 10 MIN PRN
Status: DISCONTINUED | OUTPATIENT
Start: 2019-11-11 | End: 2019-11-11 | Stop reason: HOSPADM

## 2019-11-11 RX ORDER — PROMETHAZINE HYDROCHLORIDE 25 MG/ML
6.25 INJECTION, SOLUTION INTRAMUSCULAR; INTRAVENOUS
Status: DISCONTINUED | OUTPATIENT
Start: 2019-11-11 | End: 2019-11-11 | Stop reason: HOSPADM

## 2019-11-11 RX ORDER — PROPOFOL 10 MG/ML
INJECTION, EMULSION INTRAVENOUS PRN
Status: DISCONTINUED | OUTPATIENT
Start: 2019-11-11 | End: 2019-11-11 | Stop reason: SDUPTHER

## 2019-11-11 RX ORDER — MIDAZOLAM HYDROCHLORIDE 1 MG/ML
INJECTION INTRAMUSCULAR; INTRAVENOUS PRN
Status: DISCONTINUED | OUTPATIENT
Start: 2019-11-11 | End: 2019-11-11 | Stop reason: SDUPTHER

## 2019-11-11 RX ORDER — SODIUM CHLORIDE, SODIUM LACTATE, POTASSIUM CHLORIDE, CALCIUM CHLORIDE 600; 310; 30; 20 MG/100ML; MG/100ML; MG/100ML; MG/100ML
INJECTION, SOLUTION INTRAVENOUS CONTINUOUS
Status: DISCONTINUED | OUTPATIENT
Start: 2019-11-11 | End: 2019-11-11 | Stop reason: HOSPADM

## 2019-11-11 RX ORDER — SODIUM CHLORIDE 0.9 % (FLUSH) 0.9 %
10 SYRINGE (ML) INJECTION PRN
Status: DISCONTINUED | OUTPATIENT
Start: 2019-11-11 | End: 2019-11-11 | Stop reason: HOSPADM

## 2019-11-11 RX ORDER — DEXAMETHASONE SODIUM PHOSPHATE 4 MG/ML
INJECTION, SOLUTION INTRA-ARTICULAR; INTRALESIONAL; INTRAMUSCULAR; INTRAVENOUS; SOFT TISSUE PRN
Status: DISCONTINUED | OUTPATIENT
Start: 2019-11-11 | End: 2019-11-11 | Stop reason: SDUPTHER

## 2019-11-11 RX ORDER — HYDROMORPHONE HCL 110MG/55ML
0.5 PATIENT CONTROLLED ANALGESIA SYRINGE INTRAVENOUS EVERY 5 MIN PRN
Status: DISCONTINUED | OUTPATIENT
Start: 2019-11-11 | End: 2019-11-11 | Stop reason: HOSPADM

## 2019-11-11 RX ORDER — LIDOCAINE HYDROCHLORIDE 10 MG/ML
0.5 INJECTION, SOLUTION EPIDURAL; INFILTRATION; INTRACAUDAL; PERINEURAL ONCE
Status: COMPLETED | OUTPATIENT
Start: 2019-11-11 | End: 2019-11-11

## 2019-11-11 RX ORDER — BUPIVACAINE HYDROCHLORIDE 5 MG/ML
INJECTION, SOLUTION EPIDURAL; INTRACAUDAL
Status: COMPLETED | OUTPATIENT
Start: 2019-11-11 | End: 2019-11-11

## 2019-11-11 RX ORDER — SODIUM CHLORIDE 0.9 % (FLUSH) 0.9 %
10 SYRINGE (ML) INJECTION EVERY 12 HOURS SCHEDULED
Status: DISCONTINUED | OUTPATIENT
Start: 2019-11-11 | End: 2019-11-11 | Stop reason: HOSPADM

## 2019-11-11 RX ORDER — SODIUM CHLORIDE 9 MG/ML
INJECTION, SOLUTION INTRAVENOUS CONTINUOUS
Status: DISCONTINUED | OUTPATIENT
Start: 2019-11-11 | End: 2019-11-11 | Stop reason: HOSPADM

## 2019-11-11 RX ORDER — PHENYLEPHRINE HCL IN 0.9% NACL 1 MG/10 ML
SYRINGE (ML) INTRAVENOUS PRN
Status: DISCONTINUED | OUTPATIENT
Start: 2019-11-11 | End: 2019-11-11 | Stop reason: SDUPTHER

## 2019-11-11 RX ORDER — ONDANSETRON 2 MG/ML
INJECTION INTRAMUSCULAR; INTRAVENOUS PRN
Status: DISCONTINUED | OUTPATIENT
Start: 2019-11-11 | End: 2019-11-11 | Stop reason: SDUPTHER

## 2019-11-11 RX ORDER — OXYCODONE AND ACETAMINOPHEN 10; 325 MG/1; MG/1
1 TABLET ORAL EVERY 6 HOURS PRN
Qty: 28 TABLET | Refills: 0 | Status: SHIPPED | OUTPATIENT
Start: 2019-11-11 | End: 2019-11-18

## 2019-11-11 RX ADMIN — FENTANYL CITRATE 25 MCG: 50 INJECTION, SOLUTION INTRAMUSCULAR; INTRAVENOUS at 11:02

## 2019-11-11 RX ADMIN — FENTANYL CITRATE 50 MCG: 50 INJECTION, SOLUTION INTRAMUSCULAR; INTRAVENOUS at 09:24

## 2019-11-11 RX ADMIN — Medication 100 MCG: at 11:10

## 2019-11-11 RX ADMIN — Medication 100 MCG: at 10:31

## 2019-11-11 RX ADMIN — DEXAMETHASONE SODIUM PHOSPHATE 4 MG: 4 INJECTION, SOLUTION INTRAMUSCULAR; INTRAVENOUS at 09:30

## 2019-11-11 RX ADMIN — LIDOCAINE HYDROCHLORIDE 0.2 ML: 10 INJECTION, SOLUTION EPIDURAL; INFILTRATION; INTRACAUDAL; PERINEURAL at 08:51

## 2019-11-11 RX ADMIN — MIDAZOLAM 2 MG: 1 INJECTION INTRAMUSCULAR; INTRAVENOUS at 09:17

## 2019-11-11 RX ADMIN — SODIUM CHLORIDE, POTASSIUM CHLORIDE, SODIUM LACTATE AND CALCIUM CHLORIDE: 600; 310; 30; 20 INJECTION, SOLUTION INTRAVENOUS at 08:50

## 2019-11-11 RX ADMIN — ONDANSETRON 4 MG: 2 INJECTION INTRAMUSCULAR; INTRAVENOUS at 09:30

## 2019-11-11 RX ADMIN — Medication 100 MCG: at 09:40

## 2019-11-11 RX ADMIN — PROPOFOL 50 MG: 10 INJECTION, EMULSION INTRAVENOUS at 11:02

## 2019-11-11 RX ADMIN — CEFAZOLIN SODIUM 2 G: 2 INJECTION, SOLUTION INTRAVENOUS at 09:16

## 2019-11-11 RX ADMIN — FENTANYL CITRATE 25 MCG: 50 INJECTION, SOLUTION INTRAMUSCULAR; INTRAVENOUS at 10:14

## 2019-11-11 RX ADMIN — LIDOCAINE HYDROCHLORIDE 80 MG: 20 INJECTION, SOLUTION EPIDURAL; INFILTRATION; INTRACAUDAL; PERINEURAL at 09:24

## 2019-11-11 RX ADMIN — Medication 100 MCG: at 09:38

## 2019-11-11 RX ADMIN — SODIUM CHLORIDE, POTASSIUM CHLORIDE, SODIUM LACTATE AND CALCIUM CHLORIDE: 600; 310; 30; 20 INJECTION, SOLUTION INTRAVENOUS at 10:20

## 2019-11-11 RX ADMIN — Medication 200 MCG: at 09:49

## 2019-11-11 RX ADMIN — PROPOFOL 200 MG: 10 INJECTION, EMULSION INTRAVENOUS at 09:24

## 2019-11-11 RX ADMIN — OXYCODONE AND ACETAMINOPHEN 1 TABLET: 10; 325 TABLET ORAL at 12:26

## 2019-11-11 ASSESSMENT — PULMONARY FUNCTION TESTS
PIF_VALUE: 2
PIF_VALUE: 1
PIF_VALUE: 2
PIF_VALUE: 2
PIF_VALUE: 3
PIF_VALUE: 14
PIF_VALUE: 0
PIF_VALUE: 2
PIF_VALUE: 3
PIF_VALUE: 15
PIF_VALUE: 2
PIF_VALUE: 3
PIF_VALUE: 2
PIF_VALUE: 3
PIF_VALUE: 3
PIF_VALUE: 2
PIF_VALUE: 22
PIF_VALUE: 2
PIF_VALUE: 16
PIF_VALUE: 2
PIF_VALUE: 1
PIF_VALUE: 2
PIF_VALUE: 13
PIF_VALUE: 2
PIF_VALUE: 10
PIF_VALUE: 2
PIF_VALUE: 2
PIF_VALUE: 3
PIF_VALUE: 15
PIF_VALUE: 2
PIF_VALUE: 3
PIF_VALUE: 2
PIF_VALUE: 1
PIF_VALUE: 2
PIF_VALUE: 15
PIF_VALUE: 2
PIF_VALUE: 15
PIF_VALUE: 15
PIF_VALUE: 2
PIF_VALUE: 2
PIF_VALUE: 16
PIF_VALUE: 2
PIF_VALUE: 10
PIF_VALUE: 2
PIF_VALUE: 1
PIF_VALUE: 2
PIF_VALUE: 3
PIF_VALUE: 3
PIF_VALUE: 2
PIF_VALUE: 3
PIF_VALUE: 1
PIF_VALUE: 2
PIF_VALUE: 3
PIF_VALUE: 2
PIF_VALUE: 2

## 2019-11-11 ASSESSMENT — ANXIETY QUESTIONNAIRES
5. BEING SO RESTLESS THAT IT IS HARD TO SIT STILL: 0-NOT AT ALL
1. FEELING NERVOUS, ANXIOUS, OR ON EDGE: 3-NEARLY EVERY DAY
3. WORRYING TOO MUCH ABOUT DIFFERENT THINGS: 3-NEARLY EVERY DAY
2. NOT BEING ABLE TO STOP OR CONTROL WORRYING: 3-NEARLY EVERY DAY
7. FEELING AFRAID AS IF SOMETHING AWFUL MIGHT HAPPEN: 2-OVER HALF THE DAYS
4. TROUBLE RELAXING: 3-NEARLY EVERY DAY
GAD7 TOTAL SCORE: 16
6. BECOMING EASILY ANNOYED OR IRRITABLE: 2-OVER HALF THE DAYS

## 2019-11-11 ASSESSMENT — PAIN DESCRIPTION - PROGRESSION: CLINICAL_PROGRESSION: GRADUALLY WORSENING

## 2019-11-11 ASSESSMENT — PAIN DESCRIPTION - FREQUENCY: FREQUENCY: CONTINUOUS

## 2019-11-11 ASSESSMENT — PATIENT HEALTH QUESTIONNAIRE - PHQ9
1. LITTLE INTEREST OR PLEASURE IN DOING THINGS: 3
8. MOVING OR SPEAKING SO SLOWLY THAT OTHER PEOPLE COULD HAVE NOTICED. OR THE OPPOSITE, BEING SO FIGETY OR RESTLESS THAT YOU HAVE BEEN MOVING AROUND A LOT MORE THAN USUAL: 0
6. FEELING BAD ABOUT YOURSELF - OR THAT YOU ARE A FAILURE OR HAVE LET YOURSELF OR YOUR FAMILY DOWN: 3
2. FEELING DOWN, DEPRESSED OR HOPELESS: 3
3. TROUBLE FALLING OR STAYING ASLEEP: 3
SUM OF ALL RESPONSES TO PHQ QUESTIONS 1-9: 21
9. THOUGHTS THAT YOU WOULD BE BETTER OFF DEAD, OR OF HURTING YOURSELF: 0
4. FEELING TIRED OR HAVING LITTLE ENERGY: 3
5. POOR APPETITE OR OVEREATING: 3
SUM OF ALL RESPONSES TO PHQ QUESTIONS 1-9: 21
SUM OF ALL RESPONSES TO PHQ9 QUESTIONS 1 & 2: 6
10. IF YOU CHECKED OFF ANY PROBLEMS, HOW DIFFICULT HAVE THESE PROBLEMS MADE IT FOR YOU TO DO YOUR WORK, TAKE CARE OF THINGS AT HOME, OR GET ALONG WITH OTHER PEOPLE: 3
7. TROUBLE CONCENTRATING ON THINGS, SUCH AS READING THE NEWSPAPER OR WATCHING TELEVISION: 3

## 2019-11-11 ASSESSMENT — PAIN DESCRIPTION - DESCRIPTORS: DESCRIPTORS: DISCOMFORT;SORE

## 2019-11-11 ASSESSMENT — PAIN - FUNCTIONAL ASSESSMENT: PAIN_FUNCTIONAL_ASSESSMENT: 0-10

## 2019-11-11 ASSESSMENT — PAIN DESCRIPTION - PAIN TYPE: TYPE: SURGICAL PAIN

## 2019-11-11 ASSESSMENT — PAIN DESCRIPTION - ONSET: ONSET: PROGRESSIVE

## 2019-11-11 ASSESSMENT — PAIN DESCRIPTION - LOCATION: LOCATION: FOOT

## 2019-11-11 ASSESSMENT — PAIN DESCRIPTION - ORIENTATION: ORIENTATION: RIGHT

## 2019-11-11 ASSESSMENT — PAIN SCALES - GENERAL: PAINLEVEL_OUTOF10: 4

## 2019-11-25 RX ORDER — DULOXETIN HYDROCHLORIDE 60 MG/1
120 CAPSULE, DELAYED RELEASE ORAL DAILY
Qty: 60 CAPSULE | Refills: 1 | Status: SHIPPED | OUTPATIENT
Start: 2019-11-25 | End: 2020-01-13 | Stop reason: SDUPTHER

## 2019-12-26 RX ORDER — PRAZOSIN HYDROCHLORIDE 1 MG/1
CAPSULE ORAL
Qty: 30 CAPSULE | Refills: 0 | Status: SHIPPED | OUTPATIENT
Start: 2019-12-26 | End: 2020-01-13 | Stop reason: SDUPTHER

## 2020-01-07 ENCOUNTER — OFFICE VISIT (OUTPATIENT)
Dept: INTERNAL MEDICINE CLINIC | Age: 45
End: 2020-01-07
Payer: OTHER GOVERNMENT

## 2020-01-07 VITALS
SYSTOLIC BLOOD PRESSURE: 122 MMHG | HEART RATE: 92 BPM | DIASTOLIC BLOOD PRESSURE: 80 MMHG | WEIGHT: 181 LBS | HEIGHT: 62 IN | TEMPERATURE: 98.1 F | BODY MASS INDEX: 33.31 KG/M2

## 2020-01-07 PROCEDURE — 99213 OFFICE O/P EST LOW 20 MIN: CPT | Performed by: NURSE PRACTITIONER

## 2020-01-07 RX ORDER — HYDROCODONE BITARTRATE AND ACETAMINOPHEN 5; 325 MG/1; MG/1
TABLET ORAL
COMMUNITY
Start: 2019-11-15 | End: 2020-07-13

## 2020-01-07 NOTE — PROGRESS NOTES
SUBJECTIVE:    Patient ID: Seymour Thomas is a 38 y.o. female. CC: Illness    HPI: The patient presents to the office for an acute visit. Chest congestion, cough, sore throat, ear stuffiness. Symptoms for 5-6 days, worse for past 3 days. No fever. Kids sick with viral illnesses. Current Outpatient Medications   Medication Sig Dispense Refill    HYDROcodone-acetaminophen (NORCO) 5-325 MG per tablet TK 1 TO 2 TS PO Q 8 H PRN FOR UP TO 5 DAYS      prazosin (MINIPRESS) 1 MG capsule TAKE ONE CAPSULE BY MOUTH NIGHTLY 30 capsule 0    DULoxetine (CYMBALTA) 60 MG extended release capsule Take 2 capsules by mouth daily 60 capsule 1    SYRINGE-NEEDLE, DISP, 3 ML 25G X 1\" 3 ML MISC 1 applicator by Does not apply route every 30 days 12 each 0    Insulin Pen Needle 31G X 5 MM MISC 1 each by Does not apply route daily 100 each 3    valsartan-hydrochlorothiazide (DIOVAN-HCT) 160-25 MG per tablet Take 1 tablet by mouth daily 30 tablet 3    cyanocobalamin 1000 MCG/ML injection Inject 1 mL into the muscle every 30 days 1 mL 5    amLODIPine (NORVASC) 10 MG tablet Take 1 tablet by mouth daily (Patient taking differently: Take 10 mg by mouth nightly ) 90 tablet 1    buPROPion (WELLBUTRIN XL) 300 MG extended release tablet Take 1 tablet by mouth every morning (Patient taking differently: Take 300 mg by mouth nightly ) 90 tablet 1    gabapentin (NEURONTIN) 400 MG capsule Take 1 capsule by mouth 3 times daily for 30 days. . (Patient taking differently: Take 800 mg by mouth 3 times daily. ) 90 capsule 3     No current facility-administered medications for this visit. Review of Systems   Constitutional: Negative for fever. HENT: Positive for sore throat. Negative for ear pain (stuffiness). Respiratory: Positive for cough and chest tightness. OBJECTIVE:  Physical Exam  Constitutional:       General: She is not in acute distress. Appearance: Normal appearance. She is ill-appearing.  She is not toxic-appearing. HENT:      Head: Normocephalic and atraumatic. Right Ear: A middle ear effusion is present. Tympanic membrane is not injected or erythematous. Left Ear: Tympanic membrane normal.      Mouth/Throat:      Pharynx: Posterior oropharyngeal erythema present. No pharyngeal swelling or oropharyngeal exudate. Pulmonary:      Effort: Pulmonary effort is normal. No respiratory distress. Breath sounds: Normal breath sounds. Skin:     General: Skin is warm and dry. Neurological:      General: No focal deficit present. Mental Status: She is alert and oriented to person, place, and time. /80   Pulse 92   Temp 98.1 °F (36.7 °C) (Temporal)   Ht 5' 2\" (1.575 m)   Wt 181 lb (82.1 kg)   BMI 33.11 kg/m²      PHQ Scores 11/11/2019 6/5/2019 8/16/2017   PHQ2 Score 6 4 2   PHQ9 Score 21 19 2     Interpretation of Total Score Depression Severity: 1-4 = Minimal depression, 5-9 = Mild depression, 10-14 = Moderate depression, 15-19 = Moderately severe depression, 20-27 =Severe depression        ASSESSMENT/PLAN:  Ta Rodríguez was seen today for pharyngitis. Diagnoses and all orders for this visit:    Upper respiratory tract infection, unspecified type  Acute JENNIFER (middle ear effusion), right  - No clear evidence of bacterial infection  - Given length of time, consider abx if not improving or worsening as week progresses, she will call.   - Continue OTC, flonase, recommended warm compresses over sinuses, salt water gargles, etc      Jeannette Lazcano, NIKOLE - CNP

## 2020-01-08 ENCOUNTER — TELEPHONE (OUTPATIENT)
Dept: INTERNAL MEDICINE CLINIC | Age: 45
End: 2020-01-08

## 2020-01-08 ASSESSMENT — ENCOUNTER SYMPTOMS
COUGH: 1
SORE THROAT: 1
CHEST TIGHTNESS: 1

## 2020-01-09 RX ORDER — AMOXICILLIN AND CLAVULANATE POTASSIUM 875; 125 MG/1; MG/1
1 TABLET, FILM COATED ORAL 2 TIMES DAILY
Qty: 14 TABLET | Refills: 0 | Status: SHIPPED | OUTPATIENT
Start: 2020-01-09 | End: 2020-01-16

## 2020-01-13 ENCOUNTER — OFFICE VISIT (OUTPATIENT)
Dept: PSYCHIATRY | Age: 45
End: 2020-01-13
Payer: OTHER GOVERNMENT

## 2020-01-13 ENCOUNTER — OFFICE VISIT (OUTPATIENT)
Dept: INTERNAL MEDICINE CLINIC | Age: 45
End: 2020-01-13
Payer: OTHER GOVERNMENT

## 2020-01-13 VITALS — DIASTOLIC BLOOD PRESSURE: 82 MMHG | WEIGHT: 181 LBS | SYSTOLIC BLOOD PRESSURE: 124 MMHG | BODY MASS INDEX: 33.11 KG/M2

## 2020-01-13 VITALS
HEIGHT: 62 IN | HEART RATE: 103 BPM | SYSTOLIC BLOOD PRESSURE: 122 MMHG | WEIGHT: 181 LBS | BODY MASS INDEX: 33.31 KG/M2 | DIASTOLIC BLOOD PRESSURE: 72 MMHG

## 2020-01-13 PROBLEM — Z98.890 S/P BUNIONECTOMY: Status: ACTIVE | Noted: 2020-01-13

## 2020-01-13 PROBLEM — F33.2 SEVERE EPISODE OF RECURRENT MAJOR DEPRESSIVE DISORDER, WITHOUT PSYCHOTIC FEATURES (HCC): Status: ACTIVE | Noted: 2020-01-13

## 2020-01-13 PROCEDURE — 99214 OFFICE O/P EST MOD 30 MIN: CPT | Performed by: PSYCHIATRY & NEUROLOGY

## 2020-01-13 PROCEDURE — 99214 OFFICE O/P EST MOD 30 MIN: CPT | Performed by: NURSE PRACTITIONER

## 2020-01-13 RX ORDER — DULOXETIN HYDROCHLORIDE 60 MG/1
120 CAPSULE, DELAYED RELEASE ORAL DAILY
Qty: 60 CAPSULE | Refills: 2 | Status: SHIPPED | OUTPATIENT
Start: 2020-01-13 | End: 2020-04-06 | Stop reason: SDUPTHER

## 2020-01-13 RX ORDER — TRAZODONE HYDROCHLORIDE 50 MG/1
25-50 TABLET ORAL NIGHTLY
Qty: 30 TABLET | Refills: 1 | Status: SHIPPED | OUTPATIENT
Start: 2020-01-13 | End: 2020-02-24 | Stop reason: SDUPTHER

## 2020-01-13 RX ORDER — BUPROPION HYDROCHLORIDE 150 MG/1
150 TABLET ORAL EVERY MORNING
Qty: 30 TABLET | Refills: 1 | Status: SHIPPED | OUTPATIENT
Start: 2020-01-13 | End: 2020-02-24 | Stop reason: SDUPTHER

## 2020-01-13 RX ORDER — PRAZOSIN HYDROCHLORIDE 1 MG/1
CAPSULE ORAL
Qty: 30 CAPSULE | Refills: 1 | Status: SHIPPED | OUTPATIENT
Start: 2020-01-13 | End: 2020-02-24 | Stop reason: SDUPTHER

## 2020-01-13 SDOH — ECONOMIC STABILITY: INCOME INSECURITY: HOW HARD IS IT FOR YOU TO PAY FOR THE VERY BASICS LIKE FOOD, HOUSING, MEDICAL CARE, AND HEATING?: SOMEWHAT HARD

## 2020-01-13 SDOH — ECONOMIC STABILITY: TRANSPORTATION INSECURITY
IN THE PAST 12 MONTHS, HAS THE LACK OF TRANSPORTATION KEPT YOU FROM MEDICAL APPOINTMENTS OR FROM GETTING MEDICATIONS?: NO

## 2020-01-13 SDOH — ECONOMIC STABILITY: FOOD INSECURITY: WITHIN THE PAST 12 MONTHS, THE FOOD YOU BOUGHT JUST DIDN'T LAST AND YOU DIDN'T HAVE MONEY TO GET MORE.: NEVER TRUE

## 2020-01-13 SDOH — ECONOMIC STABILITY: TRANSPORTATION INSECURITY
IN THE PAST 12 MONTHS, HAS LACK OF TRANSPORTATION KEPT YOU FROM MEETINGS, WORK, OR FROM GETTING THINGS NEEDED FOR DAILY LIVING?: NO

## 2020-01-13 SDOH — ECONOMIC STABILITY: FOOD INSECURITY: WITHIN THE PAST 12 MONTHS, YOU WORRIED THAT YOUR FOOD WOULD RUN OUT BEFORE YOU GOT MONEY TO BUY MORE.: NEVER TRUE

## 2020-01-13 ASSESSMENT — PATIENT HEALTH QUESTIONNAIRE - PHQ9
SUM OF ALL RESPONSES TO PHQ QUESTIONS 1-9: 22
8. MOVING OR SPEAKING SO SLOWLY THAT OTHER PEOPLE COULD HAVE NOTICED. OR THE OPPOSITE, BEING SO FIGETY OR RESTLESS THAT YOU HAVE BEEN MOVING AROUND A LOT MORE THAN USUAL: 1
3. TROUBLE FALLING OR STAYING ASLEEP: 3
5. POOR APPETITE OR OVEREATING: 3
1. LITTLE INTEREST OR PLEASURE IN DOING THINGS: 3
10. IF YOU CHECKED OFF ANY PROBLEMS, HOW DIFFICULT HAVE THESE PROBLEMS MADE IT FOR YOU TO DO YOUR WORK, TAKE CARE OF THINGS AT HOME, OR GET ALONG WITH OTHER PEOPLE: 3
6. FEELING BAD ABOUT YOURSELF - OR THAT YOU ARE A FAILURE OR HAVE LET YOURSELF OR YOUR FAMILY DOWN: 2
SUM OF ALL RESPONSES TO PHQ QUESTIONS 1-9: 22
7. TROUBLE CONCENTRATING ON THINGS, SUCH AS READING THE NEWSPAPER OR WATCHING TELEVISION: 3
9. THOUGHTS THAT YOU WOULD BE BETTER OFF DEAD, OR OF HURTING YOURSELF: 1
4. FEELING TIRED OR HAVING LITTLE ENERGY: 3
SUM OF ALL RESPONSES TO PHQ9 QUESTIONS 1 & 2: 6
2. FEELING DOWN, DEPRESSED OR HOPELESS: 3

## 2020-01-13 NOTE — PROGRESS NOTES
1/13/20     Chief Complaint   Patient presents with    Blood Pressure Check     Pt state bp has been stable. [facility]     Seymour Thomas returns for follow up of hypertension. Patient has been taking Her medications as prescribed. Patient's blood pressure is  controlled. Side effects related to taking the medications include no medication side effects noted. Was unable to get liraglutide for weight loss approved. She would not tolerate a stimulant due to anxiety. She signed paper today. Was sick last week - is improving   Still coughing and having more drainage. Denies any fever and chills. She is stilling Dr. Lia Villarreal for anxiety and depression. Is increasing wellbutrin. Continues to have a lack of motivation and productivity  Starting trazodone to help with sleep. Right foot bunion removal in November  Doing well since surgery still in a boot. Is still not working. Is going to get started on a resume. Current Outpatient Medications   Medication Sig Dispense Refill    buPROPion (WELLBUTRIN XL) 150 MG extended release tablet Take 1 tablet by mouth every morning Take with 300mg tablet for total of 450mg daily.  30 tablet 1    traZODone (DESYREL) 50 MG tablet Take 0.5-1 tablets by mouth nightly 30 tablet 1    brexpiprazole (REXULTI) 0.5 MG TABS tablet Take 1 tablet by mouth daily 30 tablet 0    prazosin (MINIPRESS) 1 MG capsule TAKE ONE CAPSULE BY MOUTH NIGHTLY 30 capsule 1    DULoxetine (CYMBALTA) 60 MG extended release capsule Take 2 capsules by mouth daily 60 capsule 2    liraglutide-weight management 18 MG/3ML SOPN 0.6 mg once daily for 1 week; increase by 0.6 mg daily at weekly intervals to a target dose of 3 mg once daily 1 pen 3    amoxicillin-clavulanate (AUGMENTIN) 875-125 MG per tablet Take 1 tablet by mouth 2 times daily for 7 days 14 tablet 0    HYDROcodone-acetaminophen (NORCO) 5-325 MG per tablet TK 1 TO 2 TS PO Q 8 H PRN FOR UP TO 5 DAYS      SYRINGE-NEEDLE, DISP, 3 ML 25G X 1\" 3 ML MISC 1 applicator by Does not apply route every 30 days 12 each 0    Insulin Pen Needle 31G X 5 MM MISC 1 each by Does not apply route daily 100 each 3    valsartan-hydrochlorothiazide (DIOVAN-HCT) 160-25 MG per tablet Take 1 tablet by mouth daily 30 tablet 3    cyanocobalamin 1000 MCG/ML injection Inject 1 mL into the muscle every 30 days 1 mL 5    amLODIPine (NORVASC) 10 MG tablet Take 1 tablet by mouth daily (Patient taking differently: Take 10 mg by mouth nightly ) 90 tablet 1    buPROPion (WELLBUTRIN XL) 300 MG extended release tablet Take 1 tablet by mouth every morning (Patient taking differently: Take 300 mg by mouth nightly ) 90 tablet 1    gabapentin (NEURONTIN) 400 MG capsule Take 1 capsule by mouth 3 times daily for 30 days. . (Patient taking differently: Take 800 mg by mouth 3 times daily. ) 90 capsule 3     No current facility-administered medications for this visit. Review of Systems  Negative other than HPI    Vitals:    01/13/20 1025   BP: 122/72   Pulse: 103   Weight: 181 lb (82.1 kg)   Height: 5' 2\" (1.575 m)     Physical Exam  Constitutional:       General: She is not in acute distress. Appearance: Normal appearance. She is obese. She is not ill-appearing. HENT:      Head: Normocephalic and atraumatic. Cardiovascular:      Rate and Rhythm: Normal rate and regular rhythm. Heart sounds: Normal heart sounds. No murmur. Pulmonary:      Effort: Pulmonary effort is normal.      Breath sounds: Normal breath sounds. Skin:     General: Skin is warm. Neurological:      General: No focal deficit present. Mental Status: She is alert and oriented to person, place, and time. Psychiatric:         Mood and Affect: Mood normal.         Behavior: Behavior normal.       1. Essential hypertension  Stable, controlled on current regimen. 2. Moderate episode of recurrent major depressive disorder (HCC)  Stable, controlled on current regimen.        3. Anxiety  Stable, controlled on current regimen. 4. Viral URI  Stable, improving significantly   Continue supportive care since improving     5. Weight gain  Stable, forms and letter refaxed today after signed by pt for PA. Work on diet and increased activity as tolerated and able given recent surgery   - liraglutide-weight management 18 MG/3ML SOPN; 0.6 mg once daily for 1 week; increase by 0.6 mg daily at weekly intervals to a target dose of 3 mg once daily  Dispense: 1 pen; Refill: 3    6. Class 1 obesity due to excess calories with serious comorbidity and body mass index (BMI) of 33.0 to 33.9 in adult  See 5  - liraglutide-weight management 18 MG/3ML SOPN; 0.6 mg once daily for 1 week; increase by 0.6 mg daily at weekly intervals to a target dose of 3 mg once daily  Dispense: 1 pen; Refill: 3    7.  S/P bunionectomy  Stable, doing well post op      Follow up in 6 months and prn     Electronically signed by NIKOLE Perez - CNP on 1/13/2020 at 11:50 AM

## 2020-01-13 NOTE — PROGRESS NOTES
PSYCHIATRY PROGRESS NOTE    Bharath Carter  1975  01/13/20  Face to Face time: 25 min  PCP: NIKOLE Barker - CNP    CC:   Chief Complaint   Patient presents with    Depression         S: Pt reports ongoing depression. Symptoms include anhedonia, fatigue, low motivation to do things, poor sleep at night then sleeping intermittently throughout the day, +guilt, poor concentration, poor follow through on tasks and procrastination. Denies suicidal ideation, wants to be here for her children. She often feels anxious, on edge. Worried about the upcoming anniversary of her 's death and their wedding anniversaries on Feb 13th, Feb 20th. Prazosin did help her nightmares go away. She is still needing benedryl 25-50mg qhs for sleep. She did rebuild her relationship with demetrio, her boyfriend. He doesn't live in the home which seems to have helped their relationship. He is dealing with is own issues coming out of a 21 yr marriage that was playing a role in why he left. She is upset with her weight gain and feels this really impacts her self esteem. With her ankle issues it has been hard for her to use her exercise bike but she is hopeful she can lose weight. She is not working but feels work did help her feel better about herself and was enjoyable but did not change her ability to function and the depression when she was at home. ROS: no headaches, vision problems, dysuria, abd pain, chest pain or SOB. +ankle pain, +back pain    Brief Psych Hx:  Hosp: [facility]ending [facility] to [facility] 26 2014, [facility] 2016  Diagnoses: depression, anxiety  Meds: Wellbutrin (at first somewhat helpful), duloxetine (since 2015, helps joint pain), restoril, clonazepam. Early 20's tried sertraline ? Not helpful despite taking for a couple yrs. [facility]pt: [facility] [place] prior to [facility], [facility] since 2015 NP through [facility]. Was seeing a therapist there as well but therapist left the practice.    Suicide Attempts: twice (2014 - Readings from Last 3 Encounters:   01/07/20 98.1 °F (36.7 °C) (Temporal)   11/11/19 98 °F (36.7 °C) (Temporal)   11/11/19 96.6 °F (35.9 °C)     BP Readings from Last 3 Encounters:   01/13/20 124/82   01/07/20 122/80   11/11/19 112/77     Pulse Readings from Last 3 Encounters:   01/07/20 92   11/11/19 90   10/16/19 68     PHQ Scores 1/13/2020 11/11/2019 6/5/2019 8/16/2017   PHQ2 Score 6 6 4 2   PHQ9 Score 20 21 19 2     Interpretation of Total Score Depression Severity: 1-4 = Minimal depression, 5-9 = Mild depression, 10-14 = Moderate depression, 15-19 = Moderately severe depression, 20-27 = Severe depression    NATHAN 7 SCORE 11/11/2019   NATHAN-7 Total Score 16     Interpretation of NATHAN-7 score: 5-9 = mild anxiety, 10-14 = moderate anxiety, 15+ = severe anxiety. Recommend referral to behavioral health for scores 10 or greater. Mental Status Exam:   Appearance    alert, cooperative  Motor: Normal strength and tone, No abnormal movements, tics or mannerisms. Speech    spontaneous, normal rate and normal volume  Mood/Affect    Depressed / fair motility and range.  Not tearful today  Thought Process    linear, goal directed and coherent  Thought Content    intact , no suicidal ideation  Associations    logical connections  Attention/Concentration    intact  Memory    recent and remote memory intact  Insight/Judgement    Good / Intact    Labs:   Admission on 11/11/2019, Discharged on 11/11/2019   Component Date Value Ref Range Status    Sodium 11/11/2019 140  136 - 145 mmol/L Final    Potassium 11/11/2019 3.5  3.5 - 5.1 mmol/L Final    Chloride 11/11/2019 101  99 - 110 mmol/L Final    CO2 11/11/2019 26  21 - 32 mmol/L Final    Anion Gap 11/11/2019 13  3 - 16 Final    Glucose 11/11/2019 108* 70 - 99 mg/dL Final    BUN 11/11/2019 13  7 - 20 mg/dL Final    CREATININE 11/11/2019 0.7  0.6 - 1.1 mg/dL Final    GFR Non- 11/11/2019 >60  >60 Final    Comment: >60 mL/min/1.73m2 EGFR, calc. for ages 25 and older using the  MDRD formula (not corrected for weight), is valid for stable  renal function.  GFR  11/11/2019 >60  >60 Final    Comment: Chronic Kidney Disease: less than 60 ml/min/1.73 sq.m. Kidney Failure: less than 15 ml/min/1.73 sq.m. Results valid for patients 18 years and older.  Calcium 11/11/2019 9.1  8.3 - 10.6 mg/dL Final    HCG(Urine) Pregnancy Test 11/11/2019 Negative  Detects HCG level >20 MIU/mL Final    Comment: Note:  Always repeat results in question with a serum  quantitative pregnancy test. A serum hCG is positive  2-5 days before the urine hCG test.      POC Glucose 11/11/2019 136* 70 - 99 mg/dl Final    Performed on 11/11/2019 ACCU-CHEK   Final       A:  37 yo F with depression and anxiety. Continues to be depressed. Therapy needs to be a component of her treatment given past trauma and cluster B traits. Prazosin has helped nightmares. 1. Major depressive disorder, recurrent, severe w/o psychosis  2. Trauma and stressor related disorder  3. R/o borderline PD traits. P:   1. Will increase wellbutrin XL to 450mg daily  2. Continue duloxetine 120mg daily. It historically has helped so I worry about changing this medication. genesight may help with determining if >120mg may help. 3. Will give Rx for rexulti 0.5mg daily as an option, but want to see if it is covered initially. We discussed r/b/se including metabolic syndrome, TDs. 4. Contnue prazosin 1mg qhs. Can increase to 2mg if there is recurrance of nightmares over the next month  5. Will add trazodone 25-50mg qhs prn sleep. Side effects in the past but may be worth re-trying at a low dose. 6. Pt will work on establishing with a therapist and psychiatrist at [facility]. Follow-up: RTC in 4-6 weeks  Safety: RF include mood disorder, anxiety,  / , chronic medical issue including chronic pain, hx of self harm.  Pt is high risk for future dangerousness to self or others, however at this time patient is clinically stable, future oriented, denies SI/HI.        Darien Peña MD  Psychiatrist

## 2020-01-14 ENCOUNTER — TELEPHONE (OUTPATIENT)
Dept: INTERNAL MEDICINE CLINIC | Age: 45
End: 2020-01-14

## 2020-01-16 ENCOUNTER — TELEPHONE (OUTPATIENT)
Dept: INTERNAL MEDICINE CLINIC | Age: 45
End: 2020-01-16

## 2020-01-16 NOTE — TELEPHONE ENCOUNTER
PA SUBMITTED VIA CMM FOR Saxenda 18MG/3ML pen-injectors   Key: [id number]   Rx #: [id number]  STATUS: PENDING

## 2020-01-30 RX ORDER — VALSARTAN AND HYDROCHLOROTHIAZIDE 160; 25 MG/1; MG/1
TABLET ORAL
Qty: 30 TABLET | Refills: 2 | Status: SHIPPED | OUTPATIENT
Start: 2020-01-30 | End: 2020-04-22

## 2020-02-11 RX ORDER — AMLODIPINE BESYLATE 10 MG/1
10 TABLET ORAL NIGHTLY
Qty: 90 TABLET | Refills: 0 | Status: SHIPPED | OUTPATIENT
Start: 2020-02-11 | End: 2020-05-08

## 2020-02-14 NOTE — TELEPHONE ENCOUNTER
Beena Whitaker, please let the patient know the rexulti was denied. I will discuss her medications further with her when I see her for follow up.  Thanks

## 2020-02-24 ENCOUNTER — OFFICE VISIT (OUTPATIENT)
Dept: PSYCHIATRY | Age: 45
End: 2020-02-24
Payer: OTHER GOVERNMENT

## 2020-02-24 VITALS — DIASTOLIC BLOOD PRESSURE: 80 MMHG | SYSTOLIC BLOOD PRESSURE: 122 MMHG | BODY MASS INDEX: 32.56 KG/M2 | WEIGHT: 178 LBS

## 2020-02-24 PROCEDURE — 99214 OFFICE O/P EST MOD 30 MIN: CPT | Performed by: PSYCHIATRY & NEUROLOGY

## 2020-02-24 RX ORDER — PRAZOSIN HYDROCHLORIDE 1 MG/1
CAPSULE ORAL
Qty: 30 CAPSULE | Refills: 3 | Status: SHIPPED | OUTPATIENT
Start: 2020-02-24 | End: 2020-07-21

## 2020-02-24 RX ORDER — TRAZODONE HYDROCHLORIDE 50 MG/1
25-50 TABLET ORAL NIGHTLY
Qty: 30 TABLET | Refills: 3 | Status: SHIPPED | OUTPATIENT
Start: 2020-02-24 | End: 2020-04-06 | Stop reason: SDUPTHER

## 2020-02-24 RX ORDER — BUPROPION HYDROCHLORIDE 150 MG/1
150 TABLET ORAL EVERY MORNING
Qty: 90 TABLET | Refills: 1 | Status: SHIPPED | OUTPATIENT
Start: 2020-02-24 | End: 2020-03-05

## 2020-02-24 ASSESSMENT — ANXIETY QUESTIONNAIRES
GAD7 TOTAL SCORE: 14
3. WORRYING TOO MUCH ABOUT DIFFERENT THINGS: 2-OVER HALF THE DAYS
2. NOT BEING ABLE TO STOP OR CONTROL WORRYING: 2-OVER HALF THE DAYS
1. FEELING NERVOUS, ANXIOUS, OR ON EDGE: 2-OVER HALF THE DAYS
4. TROUBLE RELAXING: 3-NEARLY EVERY DAY
5. BEING SO RESTLESS THAT IT IS HARD TO SIT STILL: 1-SEVERAL DAYS
6. BECOMING EASILY ANNOYED OR IRRITABLE: 3-NEARLY EVERY DAY
7. FEELING AFRAID AS IF SOMETHING AWFUL MIGHT HAPPEN: 1-SEVERAL DAYS

## 2020-02-24 ASSESSMENT — PATIENT HEALTH QUESTIONNAIRE - PHQ9
6. FEELING BAD ABOUT YOURSELF - OR THAT YOU ARE A FAILURE OR HAVE LET YOURSELF OR YOUR FAMILY DOWN: 1
9. THOUGHTS THAT YOU WOULD BE BETTER OFF DEAD, OR OF HURTING YOURSELF: 0
4. FEELING TIRED OR HAVING LITTLE ENERGY: 1
SUM OF ALL RESPONSES TO PHQ QUESTIONS 1-9: 8
1. LITTLE INTEREST OR PLEASURE IN DOING THINGS: 1
2. FEELING DOWN, DEPRESSED OR HOPELESS: 1
8. MOVING OR SPEAKING SO SLOWLY THAT OTHER PEOPLE COULD HAVE NOTICED. OR THE OPPOSITE, BEING SO FIGETY OR RESTLESS THAT YOU HAVE BEEN MOVING AROUND A LOT MORE THAN USUAL: 0
3. TROUBLE FALLING OR STAYING ASLEEP: 1
5. POOR APPETITE OR OVEREATING: 1
SUM OF ALL RESPONSES TO PHQ QUESTIONS 1-9: 8
SUM OF ALL RESPONSES TO PHQ9 QUESTIONS 1 & 2: 2
10. IF YOU CHECKED OFF ANY PROBLEMS, HOW DIFFICULT HAVE THESE PROBLEMS MADE IT FOR YOU TO DO YOUR WORK, TAKE CARE OF THINGS AT HOME, OR GET ALONG WITH OTHER PEOPLE: 1
7. TROUBLE CONCENTRATING ON THINGS, SUCH AS READING THE NEWSPAPER OR WATCHING TELEVISION: 2

## 2020-02-24 NOTE — PROGRESS NOTES
current facility-administered medications for this visit. O:  Wt Readings from Last 3 Encounters:   02/24/20 178 lb (80.7 kg)   01/13/20 181 lb (82.1 kg)   01/13/20 181 lb (82.1 kg)     Temp Readings from Last 3 Encounters:   01/07/20 98.1 °F (36.7 °C) (Temporal)   11/11/19 98 °F (36.7 °C) (Temporal)   11/11/19 96.6 °F (35.9 °C)     BP Readings from Last 3 Encounters:   02/24/20 122/80   01/13/20 122/72   01/13/20 124/82     Pulse Readings from Last 3 Encounters:   01/13/20 103   01/07/20 92   11/11/19 90     PHQ Scores 2/24/2020 1/13/2020 11/11/2019 6/5/2019 8/16/2017   PHQ2 Score 2 6 6 4 2   PHQ9 Score 8 22 21 19 2     Interpretation of Total Score Depression Severity: 1-4 = Minimal depression, 5-9 = Mild depression, 10-14 = Moderate depression, 15-19 = Moderately severe depression, 20-27 = Severe depression    NATHAN 7 SCORE 2/24/2020 11/11/2019   NATHAN-7 Total Score 14 16     Interpretation of NATHAN-7 score: 5-9 = mild anxiety, 10-14 = moderate anxiety, 15+ = severe anxiety. Recommend referral to behavioral health for scores 10 or greater. Mental Status Exam:   Appearance    alert, cooperative  Motor: Normal strength and tone, No abnormal movements, tics or mannerisms.   Speech    spontaneous, normal rate and normal volume  Mood/Affect    Ok/ good motility and range  Thought Process    linear, goal directed and coherent  Thought Content    intact , no suicidal ideation  Associations    logical connections  Attention/Concentration    intact  Memory    recent and remote memory intact  Insight/Judgement    Good / Intact    Labs:     Admission on 11/11/2019, Discharged on 11/11/2019   Component Date Value Ref Range Status    Sodium 11/11/2019 140  136 - 145 mmol/L Final    Potassium 11/11/2019 3.5  3.5 - 5.1 mmol/L Final    Chloride 11/11/2019 101  99 - 110 mmol/L Final    CO2 11/11/2019 26  21 - 32 mmol/L Final    Anion Gap 11/11/2019 13  3 - 16 Final    Glucose 11/11/2019 108* 70 - 99 mg/dL Final    BUN 11/11/2019 13  7 - 20 mg/dL Final    CREATININE 11/11/2019 0.7  0.6 - 1.1 mg/dL Final    GFR Non- 11/11/2019 >60  >60 Final    Comment: >60 mL/min/1.73m2 EGFR, calc. for ages 25 and older using the  MDRD formula (not corrected for weight), is valid for stable  renal function.  GFR  11/11/2019 >60  >60 Final    Comment: Chronic Kidney Disease: less than 60 ml/min/1.73 sq.m. Kidney Failure: less than 15 ml/min/1.73 sq.m. Results valid for patients 18 years and older.  Calcium 11/11/2019 9.1  8.3 - 10.6 mg/dL Final    HCG(Urine) Pregnancy Test 11/11/2019 Negative  Detects HCG level >20 MIU/mL Final    Comment: Note:  Always repeat results in question with a serum  quantitative pregnancy test. A serum hCG is positive  2-5 days before the urine hCG test.      POC Glucose 11/11/2019 136* 70 - 99 mg/dl Final    Performed on 11/11/2019 ACCU-CHEK   Final       A:  37 yo F with depression and anxiety. Doing much better, still a little depression but managing much better. Therapy needs to be a component of her treatment given past trauma and cluster B traits. [employer] was not approved. 1. Major depressive disorder, recurrent, in partial remission  2. Trauma and stressor related disorder  3. R/o borderline PD traits. P:   1. Continue wellbutrin XL 450mg daily  2. Continue duloxetine 120mg daily. 3. Contnue prazosin 1mg qhs.   4. Continue trazodone 25-50mg qhs prn sleep. 5. Recommended trial of light therapy 78186 lux 30min qam.   6. Continue to work on finding a therapist in network. I gave the name of Dr. Sera Bryant who I believe takes , but distance may be far for her. She will continue to look for a psychiatrist but if she is doing well over the next couple visits and is seeing a therapist, we may hand back to primary care as an option.      >50% of the visit was spent on counseling and education with the patient    Follow-up: RTC in 6-8 weeks  Safety:

## 2020-02-24 NOTE — PATIENT INSTRUCTIONS
Dr. Roni Velazco, Ph.D  [address]   [phone]       Light Therapy:   Verilux \"Happy Light\", 10,000 lux, [email or web address] com

## 2020-03-05 RX ORDER — BUPROPION HYDROCHLORIDE 300 MG/1
TABLET ORAL
Qty: 90 TABLET | Refills: 2 | Status: SHIPPED
Start: 2020-03-05 | End: 2020-09-14 | Stop reason: DRUGHIGH

## 2020-04-01 RX ORDER — CYANOCOBALAMIN 1000 UG/ML
INJECTION INTRAMUSCULAR; SUBCUTANEOUS
Qty: 1 VIAL | Refills: 5 | Status: SHIPPED | OUTPATIENT
Start: 2020-04-01 | End: 2020-07-13 | Stop reason: SDUPTHER

## 2020-04-06 ENCOUNTER — VIRTUAL VISIT (OUTPATIENT)
Dept: PSYCHIATRY | Age: 45
End: 2020-04-06
Payer: OTHER GOVERNMENT

## 2020-04-06 PROBLEM — F33.41 RECURRENT MAJOR DEPRESSIVE DISORDER, IN PARTIAL REMISSION (HCC): Status: ACTIVE | Noted: 2019-06-05

## 2020-04-06 PROCEDURE — 99214 OFFICE O/P EST MOD 30 MIN: CPT | Performed by: PSYCHIATRY & NEUROLOGY

## 2020-04-06 RX ORDER — TRAZODONE HYDROCHLORIDE 50 MG/1
50-75 TABLET ORAL NIGHTLY PRN
Qty: 45 TABLET | Refills: 5 | Status: SHIPPED | OUTPATIENT
Start: 2020-04-06 | End: 2020-09-14 | Stop reason: SDUPTHER

## 2020-04-06 RX ORDER — BUPROPION HYDROCHLORIDE 150 MG/1
150 TABLET ORAL EVERY MORNING
Qty: 90 TABLET | Refills: 1 | Status: SHIPPED
Start: 2020-04-06 | End: 2020-09-14 | Stop reason: DRUGHIGH

## 2020-04-06 RX ORDER — DULOXETIN HYDROCHLORIDE 60 MG/1
120 CAPSULE, DELAYED RELEASE ORAL DAILY
Qty: 60 CAPSULE | Refills: 5 | Status: SHIPPED | OUTPATIENT
Start: 2020-04-06 | End: 2020-09-14 | Stop reason: SDUPTHER

## 2020-04-22 RX ORDER — VALSARTAN AND HYDROCHLOROTHIAZIDE 160; 25 MG/1; MG/1
TABLET ORAL
Qty: 30 TABLET | Refills: 1 | Status: SHIPPED | OUTPATIENT
Start: 2020-04-22 | End: 2020-07-13 | Stop reason: SDUPTHER

## 2020-05-08 RX ORDER — AMLODIPINE BESYLATE 10 MG/1
TABLET ORAL
Qty: 90 TABLET | Refills: 0 | Status: SHIPPED | OUTPATIENT
Start: 2020-05-08 | End: 2020-07-13 | Stop reason: SDUPTHER

## 2020-05-27 ENCOUNTER — VIRTUAL VISIT (OUTPATIENT)
Dept: PSYCHIATRY | Age: 45
End: 2020-05-27
Payer: OTHER GOVERNMENT

## 2020-05-27 PROCEDURE — 99214 OFFICE O/P EST MOD 30 MIN: CPT | Performed by: PSYCHIATRY & NEUROLOGY

## 2020-05-27 NOTE — PROGRESS NOTES
PSYCHIATRY PROGRESS NOTE  TELEHEALTH VISIT    Viri Dao  1975  05/27/20  Face to Face time: 25 min  Patient Location: Home  Provider Location: Office  PCP: NIKOLE Mccray CNP    CC:   Chief Complaint   Patient presents with    Depression         S: Pursuant to the emergency declaration under the [facility] authority and the [employer] Act, this Virtual  Visit was conducted, with patient's consent, to reduce the patient's risk of exposure to COVID-19 and provide continuity of care for an established patient. Services were provided through a video synchronous discussion virtually to substitute for in-person clinic visit. Doxy. me used for the [employer]. Pt reports feeling pretty stable at this time with her depression and anxiety. She is sleeping better with the trazodone and tolerating this well. Still some depression at times, finds it hard to be motivated to get up in the morning and maintain consistent energy. Main stressor continues to be managing her children at home. She can be highly reactive at times when trying to discipline her children. In particular her 4 yo will make derogatory comments to her or swear and this can make her very emotional, sometimes will cry afterwards. Sometimes this will remind her of her mother and how she was with her and she will feel guilty for how she reacts. Her 2yo is starting  next week and she is trying to get her daughter into counseling and she believes this will help. Relationship with boyfriend is going well. ROS: no headaches, vision problems, dysuria, abd pain, chest pain or SOB. Brief Psych Hx:  Hosp: [facility] [place] [facility], [place] in 2016  Diagnoses: depression, anxiety  Meds: Wellbutrin (at first somewhat helpful), duloxetine (since 2015, helps joint pain), restoril, clonazepam. Early 20's tried sertraline ?

## 2020-05-28 ENCOUNTER — TELEPHONE (OUTPATIENT)
Dept: INTERNAL MEDICINE CLINIC | Age: 45
End: 2020-05-28

## 2020-07-13 ENCOUNTER — VIRTUAL VISIT (OUTPATIENT)
Dept: INTERNAL MEDICINE CLINIC | Age: 45
End: 2020-07-13
Payer: OTHER GOVERNMENT

## 2020-07-13 PROCEDURE — 99213 OFFICE O/P EST LOW 20 MIN: CPT | Performed by: NURSE PRACTITIONER

## 2020-07-13 RX ORDER — AMLODIPINE BESYLATE 10 MG/1
10 TABLET ORAL DAILY
Qty: 90 TABLET | Refills: 0 | Status: SHIPPED | OUTPATIENT
Start: 2020-07-13 | End: 2020-10-19

## 2020-07-13 RX ORDER — OXYCODONE HYDROCHLORIDE 5 MG/1
TABLET ORAL
COMMUNITY
Start: 2020-06-20 | End: 2022-02-24

## 2020-07-13 RX ORDER — CYANOCOBALAMIN 1000 UG/ML
INJECTION INTRAMUSCULAR; SUBCUTANEOUS
Qty: 1 VIAL | Refills: 5 | Status: SHIPPED | OUTPATIENT
Start: 2020-07-13 | End: 2021-02-08

## 2020-07-13 RX ORDER — VALSARTAN AND HYDROCHLOROTHIAZIDE 160; 25 MG/1; MG/1
1 TABLET ORAL DAILY
Qty: 30 TABLET | Refills: 1 | Status: SHIPPED | OUTPATIENT
Start: 2020-07-13 | End: 2020-10-21

## 2020-07-13 ASSESSMENT — ENCOUNTER SYMPTOMS
COUGH: 0
WHEEZING: 0
CHEST TIGHTNESS: 0
SHORTNESS OF BREATH: 0

## 2020-07-13 NOTE — PROGRESS NOTES
2020    TELEHEALTH EVALUATION -- Audio/Visual (During GLLPD-69 public health emergency)    HPI:    Vikki Aiken (:  1975) has requested an audio/video evaluation for the following concern(s):    Vikki Aiken returns for follow up of hypertension. Patient has been taking Her medications as prescribed. Patient's blood pressure is  controlled. Side effects related to taking the medications include no medication side effects noted. Home BP reports as controlled. Pt is seeing Dr. Lorna Valdez for anxiety and depression. She continues to have chronic back pain. It is being managed. Some days are worse than others- depending on her activity levels. Review of Systems   Constitutional: Negative for chills, fatigue and fever. Respiratory: Negative for cough, chest tightness, shortness of breath and wheezing. Cardiovascular: Negative for chest pain, palpitations and leg swelling. Neurological: Negative for dizziness, tremors, light-headedness and headaches. Prior to Visit Medications    Medication Sig Taking?  Authorizing Provider   amLODIPine (NORVASC) 10 MG tablet Take 1 tablet by mouth daily Yes NIKOLE Mclean CNP   valsartan-hydroCHLOROthiazide (DIOVAN-HCT) 160-25 MG per tablet Take 1 tablet by mouth daily Yes NIKOLE Diaz CNP   oxyCODONE (ROXICODONE) 5 MG immediate release tablet  Yes Historical Provider, MD   cyanocobalamin 1000 MCG/ML injection INJECT 1 MILLILITER INTO THE MUSCLE EVERY 30 DAYS Yes NIKOLE Mclean CNP   SYRINGE-NEEDLE, DISP, 3 ML 25G X 1\" 3 ML MISC 1 applicator by Does not apply route every 30 days Yes NIKOLE Diaz CNP   DULoxetine (CYMBALTA) 60 MG extended release capsule Take 2 capsules by mouth daily Yes Woody Cruz MD   traZODone (DESYREL) 50 MG tablet Take 1-1.5 tablets by mouth nightly as needed for Sleep Yes Woody Cruz MD   buPROPion (WELLBUTRIN XL) 150 MG extended release tablet Take 1 tablet by mouth every morning Take with 300mg tablet for total of 450mg daily Yes Jimmy Oviedo MD   buPROPion (WELLBUTRIN XL) 300 MG extended release tablet TAKE ONE TABLET BY MOUTH EVERY MORNING Yes NIKOLE Mclean CNP   prazosin (MINIPRESS) 1 MG capsule TAKE ONE CAPSULE BY MOUTH NIGHTLY Yes Jimmy Oviedo MD   Insulin Pen Needle 31G X 5 MM MISC 1 each by Does not apply route daily Yes NIKOLE Saavedra CNP   gabapentin (NEURONTIN) 400 MG capsule Take 1 capsule by mouth 3 times daily for 30 days. .  Patient taking differently: Take 800 mg by mouth 3 times daily. Yes Pricila Manrique MD     Social History     Tobacco Use    Smoking status: Former Smoker     Years: 1.00     Types: Cigarettes    Smokeless tobacco: Never Used   Substance Use Topics    Alcohol use: Yes     Alcohol/week: 6.0 standard drinks     Types: 6 Glasses of wine per week    Drug use: No            PHYSICAL EXAMINATION:  [ INSTRUCTIONS:  \"[x]\" Indicates a positive item  \"[]\" Indicates a negative item  -- DELETE ALL ITEMS NOT EXAMINED]  Vital Signs: (As obtained by patient/caregiver or practitioner observation)    Patient-Reported Vitals 7/13/2020   Patient-Reported Weight 175   Patient-Reported Systolic 762   Patient-Reported Diastolic 72       Physical Exam  Constitutional:       General: She is not in acute distress. Appearance: Normal appearance. She is not ill-appearing. HENT:      Head: Normocephalic and atraumatic. Pulmonary:      Effort: Pulmonary effort is normal. No respiratory distress. Neurological:      General: No focal deficit present. Mental Status: She is alert and oriented to person, place, and time. Mental status is at baseline.    Psychiatric:         Mood and Affect: Mood normal.         Behavior: Behavior normal.        Other pertinent observable physical exam findings-     Due to this being a TeleHealth encounter, evaluation of the following organ systems is limited: Vitals/Constitutional/EENT/Resp/CV/GI//MS/Neuro/Skin/Heme-Lymph-Imm. ASSESSMENT/PLAN:  1. Essential hypertension  Stable, controlled on current regimen. - amLODIPine (NORVASC) 10 MG tablet; Take 1 tablet by mouth daily  Dispense: 90 tablet; Refill: 0  - valsartan-hydroCHLOROthiazide (DIOVAN-HCT) 160-25 MG per tablet; Take 1 tablet by mouth daily  Dispense: 30 tablet; Refill: 1    2. Vitamin B12 deficiency  Stable, controlled on current regimen. - cyanocobalamin 1000 MCG/ML injection; INJECT 1 MILLILITER INTO THE MUSCLE EVERY 30 DAYS  Dispense: 1 vial; Refill: 5    3. Recurrent major depressive disorder, in partial remission (HCC)  Stable, controlled on current regimen. Continue with plan per psychiatry     4. Anxiety  Stable, controlled on current regimen. Follow up in 6 months and prn   Blood work up to date     An  electronic signature was used to authenticate this note. --NIKOLE Mosqueda - CNP on 7/13/2020 at 1:14 PM        Pursuant to the emergency declaration under the [facility] waiver authority and the [employer] Act, this Virtual  Visit was conducted, with patient's consent, to reduce the patient's risk of exposure to COVID-19 and provide continuity of care for an established patient. Services were provided through a video synchronous discussion virtually to substitute for in-person clinic visit.

## 2020-07-21 RX ORDER — PRAZOSIN HYDROCHLORIDE 1 MG/1
CAPSULE ORAL
Qty: 30 CAPSULE | Refills: 2 | Status: SHIPPED | OUTPATIENT
Start: 2020-07-21 | End: 2020-09-14 | Stop reason: SDUPTHER

## 2020-07-23 ENCOUNTER — TELEPHONE (OUTPATIENT)
Dept: INTERNAL MEDICINE CLINIC | Age: 45
End: 2020-07-23

## 2020-07-23 NOTE — TELEPHONE ENCOUNTER
Spoke with pharmacy, who told the pt she didn't have any refills, they realized they were looking at an old script and will have her refill today. Pt notified.

## 2020-07-23 NOTE — TELEPHONE ENCOUNTER
----- Message from Teresadiego Wallace sent at 7/23/2020 11:53 AM EDT -----  Subject: Refill Request    QUESTIONS  Name of Medication? DULoxetine (CYMBALTA) 60 MG extended release capsule  Patient-reported dosage and instructions? twice a day  How many days do you have left? 0  Preferred [facility]? [facility]  [phone] - F [phone]  Pharmacy phone number (if available)? [phone]  Additional Information for Provider?   ---------------------------------------------------------------------------  --------------  CALL BACK INFO  What is the best way for the office to contact you? OK to leave message on   voicemail  Preferred Call Back Phone Number?  [phone]

## 2020-07-31 ENCOUNTER — VIRTUAL VISIT (OUTPATIENT)
Dept: PSYCHIATRY | Age: 45
End: 2020-07-31
Payer: OTHER GOVERNMENT

## 2020-07-31 ENCOUNTER — TELEPHONE (OUTPATIENT)
Dept: INTERNAL MEDICINE CLINIC | Age: 45
End: 2020-07-31

## 2020-07-31 PROCEDURE — 90833 PSYTX W PT W E/M 30 MIN: CPT | Performed by: PSYCHIATRY & NEUROLOGY

## 2020-07-31 PROCEDURE — 99213 OFFICE O/P EST LOW 20 MIN: CPT | Performed by: PSYCHIATRY & NEUROLOGY

## 2020-07-31 NOTE — PROGRESS NOTES
PSYCHIATRY PROGRESS NOTE  TELEHEALTH VISIT    Pia Sweeney  1975  07/31/20  Face to Face time: 30 min  Patient Location: Home  Provider Location: Office  PCP: NIKOLE Kapadia CNP    CC:   Chief Complaint   Patient presents with    Depression    Anxiety         S: Pursuant to the emergency declaration under the [facility] [address] authority and the [employer] and [employer] Act, this Virtual  Visit was conducted, with patient's consent, to reduce the patient's risk of exposure to COVID-19 and provide continuity of care for an established patient. Services were provided through a video synchronous discussion virtually to substitute for in-person clinic visit. Doxy. me used for the [employer]. Pt overall feels she is doing pretty well. Mood is generally ok and she feels anxiety is pretty well under control. She does express concern today that she may have bipolar disorder. Her father, MGM, and MGF all have held the diagnosis. There are periods of about 3-4 days that occur 4-5 times per year where she will have more energy, be more drive to accomplish things on her task list, feel more energy and less need for sleep about 3-4 hrs per night. She may be a little more lax with her spending. She'll feel her thoughts race more. However on my detailed inquiry, there is no grandiosity, spending is not really unreasonable or excessive or ever caused financial problems. It seems these periods may be the result of chronic procrastination to the point where she finally gets some drive and enough anxiety to start to follow through with tasks. We did briefly touch on the possibility of having ADHD. She found that when she became a single mother her life was much more disorganized and hard to manage. She procrastinates often, has a hard time initiating tasks, has a hard time with time management and prioritization.  She may feel it necessary, for example, to start remodeling a room before understanding that she can't finish it in the time she assumed she could and there are more important task that she is avoiding. Although her mood is good, she feels there is some piece \"missing\" that is making it hard for her to feel more stable with mood swings and consistency in her schedule. Just went to [facility]. Starting a new job at a [employer] on Monday. THERAPY GOALS: reduce anxiety, improve interpersonal skills  THERAPY MODALITIES: supportive, cognitive  THERAPY NOTES: explored her challenges with anxiety regarding her new job and how it relates to concern about her work performance, integrating with new staff members and supervisors. Discussed communication skills and positive ways to collaborate with supervisors/doctors. Challenged assumptions she has about herself. ROS: no headaches, vision problems, dysuria, abd pain, chest pain or SOB. Brief Psych Hx:  Hosp: [facility], [facility] in 2016  Diagnoses: depression, anxiety  Meds: Wellbutrin (at first somewhat helpful), duloxetine (since 2015, helps joint pain), restoril, clonazepam. Early 20's tried sertraline ? Not helpful despite taking for a couple yrs. [facility] [place] [facility] to [facility], [facility] 2015 NP through [facility]. Was seeing a therapist there as well but therapist left the practice.    Suicide Attempts: twice (2014 - OD on medications, 2015 OD on medications)       Current Outpatient Medications   Medication Sig Dispense Refill    prazosin (MINIPRESS) 1 MG capsule TAKE ONE CAPSULE BY MOUTH ONCE NIGHTLY 30 capsule 2    amLODIPine (NORVASC) 10 MG tablet Take 1 tablet by mouth daily 90 tablet 0    valsartan-hydroCHLOROthiazide (DIOVAN-HCT) 160-25 MG per tablet Take 1 tablet by mouth daily 30 tablet 1    oxyCODONE (ROXICODONE) 5 MG immediate release tablet       cyanocobalamin 1000 MCG/ML injection INJECT 1 MILLILITER INTO THE MUSCLE EVERY 30 DAYS 1 vial 5    SYRINGE-NEEDLE, DISP, 3 ML 25G X 1\" 3 ML MISC 1 applicator by Does not apply route every 30 days 12 each 0    DULoxetine (CYMBALTA) 60 MG extended release capsule Take 2 capsules by mouth daily 60 capsule 5    traZODone (DESYREL) 50 MG tablet Take 1-1.5 tablets by mouth nightly as needed for Sleep 45 tablet 5    buPROPion (WELLBUTRIN XL) 150 MG extended release tablet Take 1 tablet by mouth every morning Take with 300mg tablet for total of 450mg daily 90 tablet 1    buPROPion (WELLBUTRIN XL) 300 MG extended release tablet TAKE ONE TABLET BY MOUTH EVERY MORNING 90 tablet 2    Insulin Pen Needle 31G X 5 MM MISC 1 each by Does not apply route daily 100 each 3    gabapentin (NEURONTIN) 400 MG capsule Take 1 capsule by mouth 3 times daily for 30 days. . (Patient taking differently: Take 800 mg by mouth 3 times daily. ) 90 capsule 3     No current facility-administered medications for this visit. O:  Wt Readings from Last 3 Encounters:   02/24/20 178 lb (80.7 kg)   01/13/20 181 lb (82.1 kg)   01/13/20 181 lb (82.1 kg)     Temp Readings from Last 3 Encounters:   01/07/20 98.1 °F (36.7 °C) (Temporal)   11/11/19 98 °F (36.7 °C) (Temporal)   11/11/19 96.6 °F (35.9 °C)     BP Readings from Last 3 Encounters:   02/24/20 122/80   01/13/20 122/72   01/13/20 124/82     Pulse Readings from Last 3 Encounters:   01/13/20 103   01/07/20 92   11/11/19 90     PHQ Scores 2/24/2020 1/13/2020 11/11/2019 6/5/2019 8/16/2017   PHQ2 Score 2 6 6 4 2   PHQ9 Score 8 22 21 19 2     Interpretation of Total Score Depression Severity: 1-4 = Minimal depression, 5-9 = Mild depression, 10-14 = Moderate depression, 15-19 = Moderately severe depression, 20-27 = Severe depression    NATHAN 7 SCORE 2/24/2020 11/11/2019   NATHAN-7 Total Score 14 16     Interpretation of NATHAN-7 score: 5-9 = mild anxiety, 10-14 = moderate anxiety, 15+ = severe anxiety.  Recommend referral to behavioral health for scores has bipolar disorder which was one of her concerns today. 1. Major depressive disorder, recurrent, in partial remission  2. Trauma and stressor related disorder  3. R/o borderline PD traits. P:   1. Continue wellbutrin XL 450mg daily  2. Continue duloxetine 120mg daily. 3. Contnue prazosin 1mg qhs.   4. Continue trazodone 50-75mg qhs prn sleep   5. Light therapy 29506 lux 30min qam.   6. Would benefit from a therapist but has not connected with one. I spent 18 min on psychotherapy with the patient. Follow-up: RTC in 3-4 weeks  Safety: RF include mood disorder, anxiety,  / , chronic medical issue including chronic pain, hx of self harm.  Pt is high risk for future dangerousness to self or others, however at this time patient is clinically stable, future oriented, denies SI/HI.        Minh Jacobs MD  Psychiatrist

## 2020-08-24 ENCOUNTER — TELEPHONE (OUTPATIENT)
Dept: INTERNAL MEDICINE CLINIC | Age: 45
End: 2020-08-24

## 2020-08-24 NOTE — TELEPHONE ENCOUNTER
Patient scheduled an appointment for 9/14/20. She states she started a new job and the change in routine has not helped. Patient states if Dr Shanna Garcia wants to see her sooner she will try to make something work. Virtual visit works best for her at this time.

## 2020-09-14 ENCOUNTER — VIRTUAL VISIT (OUTPATIENT)
Dept: PSYCHIATRY | Age: 45
End: 2020-09-14
Payer: OTHER GOVERNMENT

## 2020-09-14 PROCEDURE — 99214 OFFICE O/P EST MOD 30 MIN: CPT | Performed by: PSYCHIATRY & NEUROLOGY

## 2020-09-14 RX ORDER — TRAZODONE HYDROCHLORIDE 50 MG/1
50-75 TABLET ORAL NIGHTLY PRN
Qty: 45 TABLET | Refills: 5 | Status: SHIPPED | OUTPATIENT
Start: 2020-09-14 | End: 2021-02-24 | Stop reason: SDUPTHER

## 2020-09-14 RX ORDER — DULOXETIN HYDROCHLORIDE 60 MG/1
120 CAPSULE, DELAYED RELEASE ORAL DAILY
Qty: 60 CAPSULE | Refills: 5 | Status: SHIPPED
Start: 2020-09-14 | End: 2021-01-11 | Stop reason: DRUGHIGH

## 2020-09-14 RX ORDER — ATOMOXETINE 40 MG/1
40 CAPSULE ORAL DAILY
Qty: 30 CAPSULE | Refills: 1 | Status: SHIPPED | OUTPATIENT
Start: 2020-09-14 | End: 2020-12-03

## 2020-09-14 RX ORDER — PRAZOSIN HYDROCHLORIDE 1 MG/1
CAPSULE ORAL
Qty: 30 CAPSULE | Refills: 2 | Status: SHIPPED | OUTPATIENT
Start: 2020-09-14 | End: 2021-01-29

## 2020-09-14 RX ORDER — BUPROPION HYDROCHLORIDE 150 MG/1
TABLET ORAL
Qty: 42 TABLET | Refills: 0 | Status: SHIPPED | OUTPATIENT
Start: 2020-09-14 | End: 2021-01-11 | Stop reason: ALTCHOICE

## 2020-09-14 NOTE — PROGRESS NOTES
PSYCHIATRY PROGRESS NOTE  TELEHEALTH VISIT    Olayinka Hayes  1975  09/14/20  Face to Face time: 25 min  Patient Location: Home  Provider Location: Office  PCP: NIKOLE Montes CNP    CC:   Chief Complaint   Patient presents with    Depression    Anxiety         S: Pursuant to the emergency declaration under the [facility] [address] authority and the [employer] Act, this Virtual  Visit was conducted, with patient's consent, to reduce the patient's risk of exposure to COVID-19 and provide continuity of care for an established patient. Services were provided through a video synchronous discussion virtually to substitute for in-person clinic visit. Doxy. me used for the [employer]. Patient reports doing fairly well. He did transition to her child without any major issues. She does have some anxiety working with 1 of the providers who can be more particular and has a stronger personality. This in turn causes her a lot of anxiety. Mood has been okay. Can feel depressed when her 8year-old daughter says very negative things to her, that her self-esteem. Relationship with boyfriend is going really well. Patient continues to feel like there is \"something missing\" with her feeling stable. She struggles mostly with being distracted, having low motivation and drive to do things, feeling disorganized, having hard time prioritizing her tasks. Going back to work has not really changed things in terms of her day-to-day routine and structure. He has dealt with these issues chronically, but feels like they have been worse as an adult parent because of the multiple responsibilities she has had. Frequently compares herself to her boyfriend who does not struggle with these issues, this can in turn make her feel pretty low about herself.     ROS: no headaches, vision problems, dysuria, abd pain, chest pain or SOB.     Brief Psych Hx:  Hosp: [facility] 2014, [facility] in 2016  Diagnoses: depression, anxiety  Meds: Wellbutrin (at first somewhat helpful), duloxetine (since 2015, helps joint pain), restoril, clonazepam. Early 20's tried sertraline ? Not helpful despite taking for a couple yrs. [facility] [place] [facility] to [facility], [facility]. Was seeing a therapist there as well but therapist left the practice. Suicide Attempts: twice (2014 - OD on medications, 2015 OD on medications)       Current Outpatient Medications   Medication Sig Dispense Refill    atomoxetine (STRATTERA) 40 MG capsule Take 1 capsule by mouth daily 30 capsule 1    buPROPion (WELLBUTRIN XL) 150 MG extended release tablet Take 2 tablets by mouth every morning for 14 days, THEN 1 tablet every morning for 14 days. 42 tablet 0    prazosin (MINIPRESS) 1 MG capsule TAKE ONE CAPSULE BY MOUTH ONCE NIGHTLY 30 capsule 2    amLODIPine (NORVASC) 10 MG tablet Take 1 tablet by mouth daily 90 tablet 0    valsartan-hydroCHLOROthiazide (DIOVAN-HCT) 160-25 MG per tablet Take 1 tablet by mouth daily 30 tablet 1    oxyCODONE (ROXICODONE) 5 MG immediate release tablet       cyanocobalamin 1000 MCG/ML injection INJECT 1 MILLILITER INTO THE MUSCLE EVERY 30 DAYS 1 vial 5    SYRINGE-NEEDLE, DISP, 3 ML 25G X 1\" 3 ML MISC 1 applicator by Does not apply route every 30 days 12 each 0    DULoxetine (CYMBALTA) 60 MG extended release capsule Take 2 capsules by mouth daily 60 capsule 5    traZODone (DESYREL) 50 MG tablet Take 1-1.5 tablets by mouth nightly as needed for Sleep 45 tablet 5    Insulin Pen Needle 31G X 5 MM MISC 1 each by Does not apply route daily 100 each 3    gabapentin (NEURONTIN) 400 MG capsule Take 1 capsule by mouth 3 times daily for 30 days. . (Patient taking differently: Take 800 mg by mouth 3 times daily. ) 90 capsule 3     No current facility-administered medications for this visit.         O:  Wt Readings from Last 3 Encounters:   02/24/20 178 lb (80.7 kg)   01/13/20 181 lb (82.1 kg)   01/13/20 181 lb (82.1 kg)     Temp Readings from Last 3 Encounters:   01/07/20 98.1 °F (36.7 °C) (Temporal)   11/11/19 98 °F (36.7 °C) (Temporal)   11/11/19 96.6 °F (35.9 °C)     BP Readings from Last 3 Encounters:   02/24/20 122/80   01/13/20 122/72   01/13/20 124/82     Pulse Readings from Last 3 Encounters:   01/13/20 103   01/07/20 92   11/11/19 90     PHQ Scores 2/24/2020 1/13/2020 11/11/2019 6/5/2019 8/16/2017   PHQ2 Score 2 6 6 4 2   PHQ9 Score 8 22 21 19 2     Interpretation of Total Score Depression Severity: 1-4 = Minimal depression, 5-9 = Mild depression, 10-14 = Moderate depression, 15-19 = Moderately severe depression, 20-27 = Severe depression    NATHAN 7 SCORE 2/24/2020 11/11/2019   NATHAN-7 Total Score 14 16     Interpretation of NATHAN-7 score: 5-9 = mild anxiety, 10-14 = moderate anxiety, 15+ = severe anxiety. Recommend referral to behavioral health for scores 10 or greater. Mental Status Exam:   Appearance    alert, cooperative  Motor: Normal strength and tone, No abnormal movements, tics or mannerisms.   Speech    spontaneous, normal rate and normal volume  Mood/Affect    Ok/ good motility and range  Thought Process    linear, goal directed and coherent  Thought Content    intact , no suicidal ideation  Associations    logical connections  Attention/Concentration    intact  Memory    recent and remote memory intact  Insight/Judgement    Good / Intact    Labs:     Admission on 11/11/2019, Discharged on 11/11/2019   Component Date Value Ref Range Status    Sodium 11/11/2019 140  136 - 145 mmol/L Final    Potassium 11/11/2019 3.5  3.5 - 5.1 mmol/L Final    Chloride 11/11/2019 101  99 - 110 mmol/L Final    CO2 11/11/2019 26  21 - 32 mmol/L Final    Anion Gap 11/11/2019 13  3 - 16 Final    Glucose 11/11/2019 108* 70 - 99 mg/dL Final    BUN 11/11/2019 13  7 - 20 mg/dL Final    CREATININE 11/11/2019 0.7  0.6 - 1.1 mg/dL Final    GFR Non- 11/11/2019 >60  >60 Final    Comment: >60 mL/min/1.73m2 EGFR, calc. for ages 25 and older using the  MDRD formula (not corrected for weight), is valid for stable  renal function.  GFR  11/11/2019 >60  >60 Final    Comment: Chronic Kidney Disease: less than 60 ml/min/1.73 sq.m. Kidney Failure: less than 15 ml/min/1.73 sq.m. Results valid for patients 18 years and older.  Calcium 11/11/2019 9.1  8.3 - 10.6 mg/dL Final    HCG(Urine) Pregnancy Test 11/11/2019 Negative  Detects HCG level >20 MIU/mL Final    Comment: Note:  Always repeat results in question with a serum  quantitative pregnancy test. A serum hCG is positive  2-5 days before the urine hCG test.      POC Glucose 11/11/2019 136* 70 - 99 mg/dl Final    Performed on 11/11/2019 ACCU-CHEK   Final       A:  37 yo F with depression and anxiety. Doing pretty well. There is a possibility she has ADHD, but is hard to tell of these issues related to longstanding depression and anxiety issues. It is worth a trial of the ADHD treatment and to see if this provides any sort of benefit. 1. Major depressive disorder, recurrent, in partial remission  2. Trauma and stressor related disorder  3. R/o ADHD      P:   1. Reduce Wellbutrin XL to 300 mg daily for 2 weeks then reduce to 150 mg daily for 2 weeks then stop  2. We will start atomoxetine 40 mg daily in 2 weeks, will titrate up at next visit based on effect and tolerability. Discussed the risk benefits and side effects particularly when used in conjunction with her other medications. 3. Continue duloxetine 120mg daily. 4. Contnue prazosin 1mg qhs.   5. Continue trazodone 50-75mg qhs prn sleep   6. Light therapy 42946 lux 30min qam.   7. Would benefit from a therapist but has not connected with one.        Follow-up: RTC in 4 to 6 weeks  Safety: RF include mood disorder, anxiety,  / , chronic medical issue including chronic pain, hx of self harm.  Pt is high risk for future dangerousness to self or others, however at this time patient is clinically stable, future oriented, denies SI/HI.        Jossy Lowry MD  Psychiatrist

## 2020-09-28 ENCOUNTER — TELEPHONE (OUTPATIENT)
Dept: INTERNAL MEDICINE CLINIC | Age: 45
End: 2020-09-28

## 2020-09-28 NOTE — TELEPHONE ENCOUNTER
----- Message from Brett Jeter sent at 9/28/2020  1:33 PM EDT -----  Subject: Message to Provider    QUESTIONS  Information for Provider? Pt called to book appointment with [facility] [employer] rep was unable to contact office and pt was on lunch break. Please   call to schedule appointment   Pt states Dr. Rambo Zee wants to see her in office. Pt has Oct 20th in the   morning available.   ---------------------------------------------------------------------------  --------------  Sandrita BORJAS  What is the best way for the office to contact you? OK to leave message on   voicemail  Preferred Call Back Phone Number? [phone]  ---------------------------------------------------------------------------  --------------  SCRIPT ANSWERS  Relationship to Patient?  Self

## 2020-10-19 RX ORDER — AMLODIPINE BESYLATE 10 MG/1
TABLET ORAL
Qty: 90 TABLET | Refills: 0 | Status: SHIPPED | OUTPATIENT
Start: 2020-10-19 | End: 2021-01-22

## 2020-10-21 RX ORDER — VALSARTAN AND HYDROCHLOROTHIAZIDE 160; 25 MG/1; MG/1
TABLET ORAL
Qty: 90 TABLET | Refills: 1 | Status: SHIPPED | OUTPATIENT
Start: 2020-10-21 | End: 2021-05-03

## 2020-11-17 ENCOUNTER — HOSPITAL ENCOUNTER (OUTPATIENT)
Age: 45
Discharge: HOME | End: 2020-11-17
Payer: OTHER GOVERNMENT

## 2020-11-17 ENCOUNTER — HOSPITAL ENCOUNTER (OUTPATIENT)
Dept: GENERAL RADIOLOGY | Age: 45
Discharge: HOME | End: 2020-11-17
Payer: OTHER GOVERNMENT

## 2020-11-17 PROCEDURE — 72072 X-RAY EXAM THORAC SPINE 3VWS: CPT

## 2020-11-17 PROCEDURE — 72110 X-RAY EXAM L-2 SPINE 4/>VWS: CPT

## 2020-12-03 RX ORDER — ATOMOXETINE 40 MG/1
CAPSULE ORAL
Qty: 30 CAPSULE | Refills: 0 | Status: SHIPPED | OUTPATIENT
Start: 2020-12-03 | End: 2021-01-18 | Stop reason: SDUPTHER

## 2020-12-03 NOTE — TELEPHONE ENCOUNTER
Medication:   Requested Prescriptions     Pending Prescriptions Disp Refills    atomoxetine (STRATTERA) 40 MG capsule [Pharmacy Med Name: ATOMOXETINE HCL 40 MG CAPSULE] 30 capsule 0     Sig: TAKE ONE CAPSULE BY MOUTH DAILY        Last Filled:      Patient Phone Number: [phone] (home)     Last appt: 11/13/2020   Next appt: 1/11/2021    Last OARRS:   RX Monitoring 11/11/2019   Acute Pain Prescriptions Severe pain not adequately treated with lower dose.    Periodic Controlled Substance Monitoring -

## 2021-01-11 ENCOUNTER — OFFICE VISIT (OUTPATIENT)
Dept: PSYCHIATRY | Age: 46
End: 2021-01-11
Payer: OTHER GOVERNMENT

## 2021-01-11 ENCOUNTER — OFFICE VISIT (OUTPATIENT)
Dept: INTERNAL MEDICINE CLINIC | Age: 46
End: 2021-01-11
Payer: OTHER GOVERNMENT

## 2021-01-11 VITALS
DIASTOLIC BLOOD PRESSURE: 84 MMHG | HEART RATE: 96 BPM | WEIGHT: 180 LBS | TEMPERATURE: 97.6 F | SYSTOLIC BLOOD PRESSURE: 118 MMHG | BODY MASS INDEX: 32.92 KG/M2

## 2021-01-11 VITALS
HEART RATE: 96 BPM | TEMPERATURE: 97.6 F | SYSTOLIC BLOOD PRESSURE: 118 MMHG | DIASTOLIC BLOOD PRESSURE: 84 MMHG | BODY MASS INDEX: 33.13 KG/M2 | HEIGHT: 62 IN | WEIGHT: 180 LBS

## 2021-01-11 DIAGNOSIS — R00.2 PALPITATIONS: ICD-10-CM

## 2021-01-11 DIAGNOSIS — E53.8 VITAMIN B12 DEFICIENCY: ICD-10-CM

## 2021-01-11 DIAGNOSIS — Z13.220 LIPID SCREENING: ICD-10-CM

## 2021-01-11 DIAGNOSIS — Z23 NEED FOR INFLUENZA VACCINATION: ICD-10-CM

## 2021-01-11 DIAGNOSIS — R00.2 INTERMITTENT PALPITATIONS: Primary | ICD-10-CM

## 2021-01-11 DIAGNOSIS — F41.9 ANXIETY: ICD-10-CM

## 2021-01-11 DIAGNOSIS — R00.0 TACHYCARDIA: ICD-10-CM

## 2021-01-11 DIAGNOSIS — F33.42 RECURRENT MAJOR DEPRESSIVE DISORDER, IN FULL REMISSION (HCC): Primary | ICD-10-CM

## 2021-01-11 DIAGNOSIS — F43.9 TRAUMA AND STRESSOR-RELATED DISORDER: ICD-10-CM

## 2021-01-11 DIAGNOSIS — F33.41 RECURRENT MAJOR DEPRESSIVE DISORDER, IN PARTIAL REMISSION (HCC): ICD-10-CM

## 2021-01-11 DIAGNOSIS — I10 ESSENTIAL HYPERTENSION: ICD-10-CM

## 2021-01-11 LAB
A/G RATIO: 2.1 (ref 1.1–2.2)
ALBUMIN SERPL-MCNC: 4.6 G/DL (ref 3.4–5)
ALP BLD-CCNC: 63 U/L (ref 40–129)
ALT SERPL-CCNC: 25 U/L (ref 10–40)
ANION GAP SERPL CALCULATED.3IONS-SCNC: 11 MMOL/L (ref 3–16)
AST SERPL-CCNC: 22 U/L (ref 15–37)
BASOPHILS ABSOLUTE: 0 K/UL (ref 0–0.2)
BASOPHILS RELATIVE PERCENT: 0.8 %
BILIRUB SERPL-MCNC: 0.4 MG/DL (ref 0–1)
BUN BLDV-MCNC: 24 MG/DL (ref 7–20)
CALCIUM SERPL-MCNC: 9.5 MG/DL (ref 8.3–10.6)
CHLORIDE BLD-SCNC: 99 MMOL/L (ref 99–110)
CHOLESTEROL, TOTAL: 184 MG/DL (ref 0–199)
CO2: 27 MMOL/L (ref 21–32)
CREAT SERPL-MCNC: 0.7 MG/DL (ref 0.6–1.1)
EOSINOPHILS ABSOLUTE: 0.1 K/UL (ref 0–0.6)
EOSINOPHILS RELATIVE PERCENT: 2.5 %
GFR AFRICAN AMERICAN: >60
GFR NON-AFRICAN AMERICAN: >60
GLOBULIN: 2.2 G/DL
GLUCOSE BLD-MCNC: 113 MG/DL (ref 70–99)
HCT VFR BLD CALC: 41.4 % (ref 36–48)
HDLC SERPL-MCNC: 58 MG/DL (ref 40–60)
HEMOGLOBIN: 13.7 G/DL (ref 12–16)
LDL CHOLESTEROL CALCULATED: 108 MG/DL
LYMPHOCYTES ABSOLUTE: 1.6 K/UL (ref 1–5.1)
LYMPHOCYTES RELATIVE PERCENT: 34.1 %
MCH RBC QN AUTO: 30.8 PG (ref 26–34)
MCHC RBC AUTO-ENTMCNC: 33.1 G/DL (ref 31–36)
MCV RBC AUTO: 93.1 FL (ref 80–100)
MONOCYTES ABSOLUTE: 0.4 K/UL (ref 0–1.3)
MONOCYTES RELATIVE PERCENT: 9.4 %
NEUTROPHILS ABSOLUTE: 2.5 K/UL (ref 1.7–7.7)
NEUTROPHILS RELATIVE PERCENT: 53.2 %
PDW BLD-RTO: 12.4 % (ref 12.4–15.4)
PLATELET # BLD: 221 K/UL (ref 135–450)
PMV BLD AUTO: 10.5 FL (ref 5–10.5)
POTASSIUM SERPL-SCNC: 4.5 MMOL/L (ref 3.5–5.1)
RBC # BLD: 4.45 M/UL (ref 4–5.2)
SODIUM BLD-SCNC: 137 MMOL/L (ref 136–145)
TOTAL PROTEIN: 6.8 G/DL (ref 6.4–8.2)
TRIGL SERPL-MCNC: 91 MG/DL (ref 0–150)
TSH REFLEX FT4: 2.83 UIU/ML (ref 0.27–4.2)
VLDLC SERPL CALC-MCNC: 18 MG/DL
WBC # BLD: 4.7 K/UL (ref 4–11)

## 2021-01-11 PROCEDURE — 90471 IMMUNIZATION ADMIN: CPT | Performed by: NURSE PRACTITIONER

## 2021-01-11 PROCEDURE — 90686 IIV4 VACC NO PRSV 0.5 ML IM: CPT | Performed by: NURSE PRACTITIONER

## 2021-01-11 PROCEDURE — 99214 OFFICE O/P EST MOD 30 MIN: CPT | Performed by: PSYCHIATRY & NEUROLOGY

## 2021-01-11 PROCEDURE — 99396 PREV VISIT EST AGE 40-64: CPT | Performed by: NURSE PRACTITIONER

## 2021-01-11 PROCEDURE — 93000 ELECTROCARDIOGRAM COMPLETE: CPT | Performed by: NURSE PRACTITIONER

## 2021-01-11 PROCEDURE — 90833 PSYTX W PT W E/M 30 MIN: CPT | Performed by: PSYCHIATRY & NEUROLOGY

## 2021-01-11 RX ORDER — DULOXETIN HYDROCHLORIDE 30 MG/1
30 CAPSULE, DELAYED RELEASE ORAL DAILY
Qty: 30 CAPSULE | Refills: 2 | Status: SHIPPED | OUTPATIENT
Start: 2021-01-11 | End: 2021-03-24 | Stop reason: ALTCHOICE

## 2021-01-11 RX ORDER — DULOXETIN HYDROCHLORIDE 60 MG/1
60 CAPSULE, DELAYED RELEASE ORAL DAILY
Qty: 30 CAPSULE | Refills: 2 | Status: SHIPPED | OUTPATIENT
Start: 2021-01-11 | End: 2021-03-24 | Stop reason: SDUPTHER

## 2021-01-11 ASSESSMENT — ENCOUNTER SYMPTOMS
WHEEZING: 0
SHORTNESS OF BREATH: 0
CHEST TIGHTNESS: 0
COUGH: 0

## 2021-01-11 NOTE — PROGRESS NOTES
1/11/21     Chief Complaint   Patient presents with    6 Month Follow-Up     HPI    Pt is here for 6 month follow up   Overall feeling well and doing well   Seeing Dr. Mariel Saleem for anxiety and depression     Intermittent palpitations - decreasing cymbalta with psychiatry to help- today is the first day of decreased dose. New onset a few weeks ago   Palpitations have been improving compared to the last few weeks. Less frequent and less intense. Denies any cp, sob or dizziness   Lasting a few seconds then resolves without intervention   Denies any today     HTN - has been well controlled. Getting 10k steps a day   No set exercise routine    Caffeine - 1 cup of tea in the am - does not make them worse.       Sleep - up and down at night   Medication is helping with nightmares and sleep   Snores at times - does not think it is consistent     No Known Allergies    Current Outpatient Medications   Medication Sig Dispense Refill    DULoxetine (CYMBALTA) 60 MG extended release capsule Take 1 capsule by mouth daily 30 capsule 2    DULoxetine (CYMBALTA) 30 MG extended release capsule Take 1 capsule by mouth daily Take with 60mg cap for total of 90mg daily 30 capsule 2    atomoxetine (STRATTERA) 40 MG capsule TAKE ONE CAPSULE BY MOUTH DAILY 30 capsule 0    valsartan-hydroCHLOROthiazide (DIOVAN-HCT) 160-25 MG per tablet TAKE ONE TABLET BY MOUTH DAILY 90 tablet 1    amLODIPine (NORVASC) 10 MG tablet TAKE ONE TABLET BY MOUTH DAILY 90 tablet 0    traZODone (DESYREL) 50 MG tablet Take 1-1.5 tablets by mouth nightly as needed for Sleep 45 tablet 5    prazosin (MINIPRESS) 1 MG capsule TAKE ONE CAPSULE BY MOUTH ONCE NIGHTLY 30 capsule 2    oxyCODONE (ROXICODONE) 5 MG immediate release tablet       cyanocobalamin 1000 MCG/ML injection INJECT 1 MILLILITER INTO THE MUSCLE EVERY 30 DAYS 1 vial 5    SYRINGE-NEEDLE, DISP, 3 ML 25G X 1\" 3 ML MISC 1 applicator by Does not apply route every 30 days 12 each 0    Insulin Pen Needle 31G X 5 MM MISC 1 each by Does not apply route daily 100 each 3    gabapentin (NEURONTIN) 400 MG capsule Take 1 capsule by mouth 3 times daily for 30 days. . (Patient taking differently: Take 800 mg by mouth 3 times daily. ) 90 capsule 3     No current facility-administered medications for this visit. Review of Systems   Constitutional: Negative for chills, fatigue and fever. Respiratory: Negative for cough, chest tightness, shortness of breath and wheezing. Cardiovascular: Positive for palpitations (improving). Negative for chest pain and leg swelling. Neurological: Negative for dizziness, tremors, light-headedness and headaches. Vitals:    01/11/21 1145   BP: 118/84   Pulse: 96   Temp: 97.6 °F (36.4 °C)   Weight: 180 lb (81.6 kg)      Physical Exam  Vitals signs reviewed. Constitutional:       General: She is not in acute distress. Appearance: She is well-developed. She is not ill-appearing or diaphoretic. HENT:      Head: Normocephalic and atraumatic. Cardiovascular:      Rate and Rhythm: Normal rate and regular rhythm. Heart sounds: Normal heart sounds. No murmur. Pulmonary:      Effort: Pulmonary effort is normal. No respiratory distress. Breath sounds: Normal breath sounds. No wheezing or rhonchi. Musculoskeletal:      Right lower leg: No edema. Left lower leg: No edema. Skin:     General: Skin is warm and dry. Neurological:      General: No focal deficit present. Mental Status: She is alert and oriented to person, place, and time. Psychiatric:         Mood and Affect: Mood and affect normal.         Behavior: Behavior normal.         Assessment/Plan:  1. Intermittent palpitations  Stable, mild and intermittent - reports improving since onset  Checking blood work  Decreasing Cymbalta with psychiatry   If not improving or worsening - we discussed a holter monitor and/or sleep study   - Comprehensive Metabolic Panel;  Future  - CBC Auto Differential; Future  - TSH WITH REFLEX TO FT4; Future  - EKG 12 Lead: NSR without acute ischemia or infarct     2. Anxiety  Stable, controlled on current regimen. Continue with plan per psychiatry.   - TSH WITH REFLEX TO FT4; Future    3. Recurrent major depressive disorder, in partial remission (HCC)  Stable, controlled on current regimen. Continue with plan per psychiatry. 4. Essential hypertension  Stable, controlled on current regimen. 5. Vitamin B12 deficiency  Stable, controlled on current regimen. 6. Need for influenza vaccination  - INFLUENZA, QUADV, 3 YRS AND OLDER, IM PF, PREFILL SYR OR SDV, 0.5ML (AFLURIA QUADV, PF)    7. Lipid screening  - Lipid Panel; Future      Discussed medications with patient, who voiced understanding of their use and indications. All questions answered.     F/u yearly and prn   Sooner if palpitations do not improve with medication changes     Electronically signed by NIKOLE Taylor CNP on 1/11/2021 at 2:44 PM

## 2021-01-11 NOTE — PROGRESS NOTES
PSYCHIATRY PROGRESS NOTE      Lionel Paul  1975  01/11/21  PCP: NIKOLE Tony - CNP    CC:   Chief Complaint   Patient presents with    Follow-up         S:   Pt was last seen 3-4 months ago. At that time we tapered her off bupropion to strattera to see if this improves some issues that were being noticed regarding attention deficit. Since that time patient has noted things have been going fairly well. This is multi-factorial - seems she is feeling more confident in her abilities at work and more adjusted to the people and work environment. She works 12 hrs and can feel pretty exhausted by the end of the day. Over the last couple weeks she has noticed more motivation in regards to cleaning her home environment, removing clutter, organizing more. Other changes include getting proposed to by her boyfriend in December. Things have generally been going well but there have been some notable conflicts, including a period of time where she didn't speak to him for 1 week. Stressors include having more pain (neck, feet) recently. THERAPY GOALS: reduce anxiety and depression  THERAPY MODALITIES: cognitive behavioral  THERAPY NOTES: discussed her feelings, emotions during recent conflicts. Validated patient's emotional reactions. Discussed interpersonal effectiveness and recognizing and addressing underlying emotional needs. ROS: +racing heart beat sensation, possible palpitations. no headaches, vision problems, dysuria, abd pain, chest pain or SOB. Brief Psych Hx:  Hosp: [facility] 2014, [facility] in 2016  Diagnoses: depression, anxiety  Meds: Wellbutrin (at first somewhat helpful), duloxetine (since 2015, helps joint pain), restoril, clonazepam. Early 20's tried sertraline ? Not helpful despite taking for a couple yrs. [facility]pt: [facility] [place] prior to 2012, [facility]. Was seeing a therapist there as well but therapist left the practice. Suicide Attempts: twice (2014 - OD on medications, 2015 OD on medications)       Current Outpatient Medications   Medication Sig Dispense Refill    DULoxetine (CYMBALTA) 60 MG extended release capsule Take 1 capsule by mouth daily 30 capsule 2    DULoxetine (CYMBALTA) 30 MG extended release capsule Take 1 capsule by mouth daily Take with 60mg cap for total of 90mg daily 30 capsule 2    atomoxetine (STRATTERA) 40 MG capsule TAKE ONE CAPSULE BY MOUTH DAILY 30 capsule 0    valsartan-hydroCHLOROthiazide (DIOVAN-HCT) 160-25 MG per tablet TAKE ONE TABLET BY MOUTH DAILY 90 tablet 1    amLODIPine (NORVASC) 10 MG tablet TAKE ONE TABLET BY MOUTH DAILY 90 tablet 0    traZODone (DESYREL) 50 MG tablet Take 1-1.5 tablets by mouth nightly as needed for Sleep 45 tablet 5    prazosin (MINIPRESS) 1 MG capsule TAKE ONE CAPSULE BY MOUTH ONCE NIGHTLY 30 capsule 2    oxyCODONE (ROXICODONE) 5 MG immediate release tablet       cyanocobalamin 1000 MCG/ML injection INJECT 1 MILLILITER INTO THE MUSCLE EVERY 30 DAYS 1 vial 5    SYRINGE-NEEDLE, DISP, 3 ML 25G X 1\" 3 ML MISC 1 applicator by Does not apply route every 30 days 12 each 0    Insulin Pen Needle 31G X 5 MM MISC 1 each by Does not apply route daily 100 each 3    gabapentin (NEURONTIN) 400 MG capsule Take 1 capsule by mouth 3 times daily for 30 days. . (Patient taking differently: Take 800 mg by mouth 3 times daily. ) 90 capsule 3     No current facility-administered medications for this visit.         O:  Wt Readings from Last 3 Encounters:   01/11/21 180 lb (81.6 kg)   01/11/21 180 lb (81.6 kg)   02/24/20 178 lb (80.7 kg)     Temp Readings from Last 3 Encounters:   01/11/21 97.6 °F (36.4 °C)   01/11/21 97.6 °F (36.4 °C) (Temporal)   01/07/20 98.1 °F (36.7 °C) (Temporal)     BP Readings from Last 3 Encounters:   01/11/21 118/84   01/11/21 118/84   02/24/20 122/80     Pulse Readings from Last 3 Encounters:   01/11/21 96   01/11/21 96   01/13/20 103     PHQ Scores 2/24/2020 1/13/2020 11/11/2019 6/5/2019 8/16/2017   PHQ2 Score 2 6 6 4 2   PHQ9 Score 8 22 21 19 2     Interpretation of Total Score Depression Severity: 1-4 = Minimal depression, 5-9 = Mild depression, 10-14 = Moderate depression, 15-19 = Moderately severe depression, 20-27 = Severe depression    NATHAN 7 SCORE 2/24/2020 11/11/2019   NATHAN-7 Total Score 14 16     Interpretation of NATHAN-7 score: 5-9 = mild anxiety, 10-14 = moderate anxiety, 15+ = severe anxiety. Recommend referral to behavioral health for scores 10 or greater. Mental Status Exam:   Appearance    alert, cooperative  Motor: No abnormal movements, tics or mannerisms. Speech    spontaneous, normal rate and normal volume  Mood/Affect    Ok / good motility and range, almost tearful at times  Thought Process    linear, goal directed and coherent  Thought Content    intact , no suicidal ideation  Associations    logical connections  Attention/Concentration    intact  Memory    recent and remote memory intact  Insight/Judgement    Good / Intact    Labs:     Admission on 11/11/2019, Discharged on 11/11/2019   Component Date Value Ref Range Status    Sodium 11/11/2019 140  136 - 145 mmol/L Final    Potassium 11/11/2019 3.5  3.5 - 5.1 mmol/L Final    Chloride 11/11/2019 101  99 - 110 mmol/L Final    CO2 11/11/2019 26  21 - 32 mmol/L Final    Anion Gap 11/11/2019 13  3 - 16 Final    Glucose 11/11/2019 108* 70 - 99 mg/dL Final    BUN 11/11/2019 13  7 - 20 mg/dL Final    CREATININE 11/11/2019 0.7  0.6 - 1.1 mg/dL Final    GFR Non- 11/11/2019 >60  >60 Final    Comment: >60 mL/min/1.73m2 EGFR, calc. for ages 25 and older using the  MDRD formula (not corrected for weight), is valid for stable  renal function.  GFR  11/11/2019 >60  >60 Final    Comment: Chronic Kidney Disease: less than 60 ml/min/1.73 sq.m. Kidney Failure: less than 15 ml/min/1.73 sq.m. Results valid for patients 18 years and older.       Calcium 11/11/2019

## 2021-01-18 ENCOUNTER — TELEPHONE (OUTPATIENT)
Dept: INTERNAL MEDICINE CLINIC | Age: 46
End: 2021-01-18

## 2021-01-18 RX ORDER — ATOMOXETINE 40 MG/1
CAPSULE ORAL
Qty: 30 CAPSULE | Refills: 2 | Status: SHIPPED
Start: 2021-01-18 | End: 2021-03-24 | Stop reason: DRUGHIGH

## 2021-01-19 ENCOUNTER — TELEPHONE (OUTPATIENT)
Dept: INTERNAL MEDICINE CLINIC | Age: 46
End: 2021-01-19

## 2021-01-19 NOTE — TELEPHONE ENCOUNTER
----- Message from Hong Mello sent at 1/19/2021  3:33 PM EST -----  Subject: Message to Provider    QUESTIONS  Information for Provider? Pt called in b/c she needs a rapid covid test   done but know that her PCP doesn't do it. Pt would like an order to go to   a hospital and get it done so that she can get back to work asap  ---------------------------------------------------------------------------  --------------  [employer]  What is the best way for the office to contact you? OK to leave message on   voicemail  Preferred Call Back Phone Number? [phone]  ---------------------------------------------------------------------------  --------------  SCRIPT ANSWERS  Relationship to Patient?  Self

## 2021-01-20 ENCOUNTER — OFFICE VISIT (OUTPATIENT)
Dept: ORTHOPEDIC SURGERY | Age: 46
End: 2021-01-20
Payer: OTHER GOVERNMENT

## 2021-01-20 DIAGNOSIS — G56.02 CARPAL TUNNEL SYNDROME OF LEFT WRIST: Primary | ICD-10-CM

## 2021-01-20 PROCEDURE — 99213 OFFICE O/P EST LOW 20 MIN: CPT | Performed by: ORTHOPAEDIC SURGERY

## 2021-01-20 PROCEDURE — 20526 THER INJECTION CARP TUNNEL: CPT | Performed by: ORTHOPAEDIC SURGERY

## 2021-01-20 RX ORDER — TRIAMCINOLONE ACETONIDE 40 MG/ML
40 INJECTION, SUSPENSION INTRA-ARTICULAR; INTRAMUSCULAR ONCE
Status: COMPLETED | OUTPATIENT
Start: 2021-01-20 | End: 2021-01-20

## 2021-01-20 RX ORDER — LIDOCAINE HYDROCHLORIDE 10 MG/ML
20 INJECTION, SOLUTION INFILTRATION; PERINEURAL ONCE
Status: COMPLETED | OUTPATIENT
Start: 2021-01-20 | End: 2021-01-20

## 2021-01-20 RX ADMIN — TRIAMCINOLONE ACETONIDE 40 MG: 40 INJECTION, SUSPENSION INTRA-ARTICULAR; INTRAMUSCULAR at 11:05

## 2021-01-20 RX ADMIN — LIDOCAINE HYDROCHLORIDE 20 ML: 10 INJECTION, SOLUTION INFILTRATION; PERINEURAL at 11:04

## 2021-01-20 NOTE — PROGRESS NOTES
Assessment: 42-year-old female presenting with history of left wrist trauma now approximately 3 years ago also with a history of numbness and tingling in left hand now worsening over the last several months to 1 year  1. Suspect peripheral compression neuropathy including left carpal tunnel syndrome and possible left cubital tunnel syndrome    Treatment Plan: I discussed with the patient results of images today. We also discussed her current symptoms consistent with likely compression neuropathy including carpal tunnel syndrome and based on her description and exam possibly component of cubital tunnel syndrome or multifocal compression neuropathy  We discussed conservative management options including bracing at nighttime for both her elbow and her wrist and we did describe several relative elbow extension splinting techniques at nighttime. We also discussed ulnar nerve glide exercises with her to initiate treatment  I also offered her diagnostic and therapeutic corticosteroid injection for her left carpal tunnel today  The risks and benefits of steroid injection were discussed thoroughly. Risks discussed included infection, adverse drug reaction, increased pain post-injection, skin de-pigmentation, fatty atrophy, and persistent clinical symptoms despite injection. The injection was given after cleansing the skin with alcohol. The patients left carpal tunnel was prepped for steroid injection. Using sterile technique, the left carpal tunnel was injected with a mixture of 1 ml of 40mg/ml Kenalog and 1 mL 1% lidocaine. Appropriate post injection instructions were given. The patient tolerated the injection well and a Band-aid was placed. We will plan to obtain electrodiagnostic study left upper extremity for further evaluation as well  I will see her back in approximately 1 month after results of electrodiagnostic study and initial response to most recent conservative management    No follow-ups on file. History of Present Illness  Sajniv Duarte is a 41 y.o. female here today for a follow-up for her left hand and wrist symptoms specifically numbness and tingling  She has been diagnosed with carpal tunnel syndrome in the past and has initiated conservative management several years ago with wrist braces at night  She did have initial benefit from wearing the braces at nighttime but has noticed some more frequent and progressive numbness and tingling in her fingers with gripping and certain day-to-day activities including using hairbrush. She feels that typically all of her fingers can be involved and she does have some radiating symptoms up into her elbow as well  She does have nocturnal symptoms waking up several times at nighttime  She notices some occasional right-sided symptoms but not as consistent as well  She was also seen by me in the past for ulnar-sided wrist pain that has been overall stable and she also had a closed radial styloid fracture treated nonoperatively as well      Review of Systems  Pertinent items are noted in HPI  Review of systems reviewed from Patient History Form dated on 1/20/2021 and available in the patient's chart under the Media tab. Past Medical History:   Diagnosis Date    Anxiety     Depression     Hypertension     Insomnia           Vital Signs  There were no vitals filed for this visit. Physical Exam  Constitutional:  Patient is well-nourished and demonstrates normal hygiene. Mental Status:  Patient is alert and oriented to person, place and time. Skin:  Intact, no rashes or lesions.      Left Wrist/left upper extremity Examination:     Inspection:  No swelling throughout left wrist, no ecchymosis or additional skin changes  No obvious deformity of left wrist including the distal radial ulnar joint     Palpation: No tenderness to palpation overlying radial styloid and dorsal distal radius and no specific tenderness throughout the remainder of the wrist joint  No tenderness along ECU tendon sheath and mild tenderness at ulnar fovea     Range of Motion:  Active range of motion 70 ° extension and 70 ° of flexion with no crepitus or instability  Patient able to actively perform flexion and extension as well as pronation and supination with no discomfort, pronation and supination smooth with stable distal radial ulnar joint,   Strength: Active finger flexion, extension, abduction with full composite fist and extension of all fingers  Active 5 out of 5 EPL, FPL, thumb abduction without pain  No thenar atrophy or intrinsic muscle wasting    Special Tests:    No tenderness ulnar fovea without crepitus  Sensation grossly intact to median, ulnar, radial nerve without deficit  Positive provocative testing for carpal tunnel with positive Tinel's overlying carpal tunnel  Positive direct carpal tunnel compression test  Positive Tinel's at left cubital tunnel and positive elbow hyperflexion test  3+ brachioradialis triceps tendon reflex with negative Eder sign      Capillary refill brisk all fingers, symmetric  Gross sensation intact to light touch median/ulnar/radial nerves  Sensation intact to radial/ulnar aspect of fingertip           Radiology:    X-rays obtained and reviewed in office:  3 views of the left wrist:  No degenerative changes. There is evidence of healed radial styloid fracture with no new intra-articular step-off, appropriate alignment otherwise with no additional osseous abnormalities      Additional Diagnostic Test Findings:  MRI left wrist joint without contrast 10/30/2018  Radial styloid fracture with no significant articular surface depression  2. High-grade sprain/partial tear of the dorsal scapholunate ligament with minimal dorsal scapholunate interval widening  3.  TFCC Stout 1A tear and 1B sprain with small DRUJ effusion  As interpreted by [facility] radiologist, images personally reviewed by me    Capillary refill brisk all fingers, symmetric  Gross sensation intact to light touch median/ulnar/radial nerves  Sensation intact to radial/ulnar aspect of fingertip        Additional Diagnostic Test Findings:    Office Procedures:  No orders of the defined types were placed in this encounter. Vinicius Brody MD  Orthopaedic Surgeon, Hand & Upper Extremity   [facility]    Contact Information:  Eulalio Huffman: [phone] Clinical )    This dictation was performed with a verbal recognition program [facility]) and it was checked for errors. It is possible that there are still dictated errors within this office note. If so, please bring any errors to my attention for an addendum. All efforts were made to ensure that this office note is accurate.

## 2021-01-20 NOTE — TELEPHONE ENCOUNTER
Please schedule for covid test - needs to quarantine until test results are back and patient is feeling better.

## 2021-01-22 DIAGNOSIS — I10 ESSENTIAL HYPERTENSION: ICD-10-CM

## 2021-01-22 RX ORDER — AMLODIPINE BESYLATE 10 MG/1
TABLET ORAL
Qty: 90 TABLET | Refills: 1 | Status: SHIPPED | OUTPATIENT
Start: 2021-01-22 | End: 2021-08-10

## 2021-02-04 ENCOUNTER — TELEPHONE (OUTPATIENT)
Dept: INTERNAL MEDICINE CLINIC | Age: 46
End: 2021-02-04

## 2021-02-04 DIAGNOSIS — F41.9 ANXIETY: ICD-10-CM

## 2021-02-04 NOTE — TELEPHONE ENCOUNTER
Pt calling ---crying---for something for her nerves---has been diagnosed with cervical cancer---has young daughter with all kinds o anxiety problems and now the fiance has left her---she is a mess---can you please call the pt. Thanks.

## 2021-02-05 RX ORDER — HYDROXYZINE HYDROCHLORIDE 25 MG/1
25 TABLET, FILM COATED ORAL EVERY 8 HOURS PRN
Qty: 30 TABLET | Refills: 0 | Status: SHIPPED | OUTPATIENT
Start: 2021-02-05 | End: 2021-02-15

## 2021-02-05 NOTE — TELEPHONE ENCOUNTER
Attempted to call - no answer. Sending over hydroxyzine she has been on this before.    Seamus Morrison

## 2021-02-06 DIAGNOSIS — E53.8 VITAMIN B12 DEFICIENCY: ICD-10-CM

## 2021-02-08 RX ORDER — CYANOCOBALAMIN 1000 UG/ML
INJECTION INTRAMUSCULAR; SUBCUTANEOUS
Qty: 1 ML | Refills: 4 | Status: SHIPPED | OUTPATIENT
Start: 2021-02-08 | End: 2021-08-10

## 2021-02-24 ENCOUNTER — VIRTUAL VISIT (OUTPATIENT)
Dept: PSYCHIATRY | Age: 46
End: 2021-02-24
Payer: OTHER GOVERNMENT

## 2021-02-24 DIAGNOSIS — F33.1 MODERATE EPISODE OF RECURRENT MAJOR DEPRESSIVE DISORDER (HCC): Primary | ICD-10-CM

## 2021-02-24 PROCEDURE — G8427 DOCREV CUR MEDS BY ELIG CLIN: HCPCS | Performed by: PSYCHIATRY & NEUROLOGY

## 2021-02-24 PROCEDURE — 99214 OFFICE O/P EST MOD 30 MIN: CPT | Performed by: PSYCHIATRY & NEUROLOGY

## 2021-02-24 PROCEDURE — 90833 PSYTX W PT W E/M 30 MIN: CPT | Performed by: PSYCHIATRY & NEUROLOGY

## 2021-02-24 RX ORDER — TRAZODONE HYDROCHLORIDE 50 MG/1
50-75 TABLET ORAL NIGHTLY PRN
Qty: 45 TABLET | Refills: 5 | Status: SHIPPED | OUTPATIENT
Start: 2021-02-24 | End: 2021-08-04 | Stop reason: SDUPTHER

## 2021-02-24 NOTE — PROGRESS NOTES
and a good foundation for herself. We explored some of the factors that are not in her control in an effort to reduce her anxiety about these factors. ROS: No palpitations or chest pain. Brief Psych Hx:  Hosp: [facility] 2014, [facility] in 2016  Diagnoses: depression, anxiety  Meds: Wellbutrin (at first somewhat helpful), duloxetine (since 2015, helps joint pain), restoril, clonazepam. Early 20's tried sertraline ? Not helpful despite taking for a couple yrs. [facility]: [facility] [place] [facility] to [facility], [facility] NP through [facility]. Was seeing a therapist there as well but therapist left the practice.    Suicide Attempts: twice (2014 - OD on medications, 2015 OD on medications)       Current Outpatient Medications   Medication Sig Dispense Refill    cyanocobalamin 1000 MCG/ML injection INJECT ONE MILLILITER INTO THE MUSCLE EVERY 30 DAYS 1 mL 4    prazosin (MINIPRESS) 1 MG capsule TAKE ONE CAPSULE BY MOUTH ONCE NIGHTLY 30 capsule 2    amLODIPine (NORVASC) 10 MG tablet TAKE ONE TABLET BY MOUTH DAILY 90 tablet 1    atomoxetine (STRATTERA) 40 MG capsule TAKE ONE CAPSULE BY MOUTH DAILY 30 capsule 2    DULoxetine (CYMBALTA) 60 MG extended release capsule Take 1 capsule by mouth daily 30 capsule 2    DULoxetine (CYMBALTA) 30 MG extended release capsule Take 1 capsule by mouth daily Take with 60mg cap for total of 90mg daily 30 capsule 2    valsartan-hydroCHLOROthiazide (DIOVAN-HCT) 160-25 MG per tablet TAKE ONE TABLET BY MOUTH DAILY 90 tablet 1    traZODone (DESYREL) 50 MG tablet Take 1-1.5 tablets by mouth nightly as needed for Sleep 45 tablet 5    oxyCODONE (ROXICODONE) 5 MG immediate release tablet       SYRINGE-NEEDLE, DISP, 3 ML 25G X 1\" 3 ML MISC 1 applicator by Does not apply route every 30 days 12 each 0    Insulin Pen Needle 31G X 5 MM MISC 1 each by Does not apply route daily 100 each 3    gabapentin (NEURONTIN) 400 MG capsule Take 1 capsule by mouth 3 times daily for 30 days.. (Patient taking differently: Take 800 mg by mouth 3 times daily. ) 90 capsule 3     No current facility-administered medications for this visit. O:  Wt Readings from Last 3 Encounters:   01/11/21 180 lb (81.6 kg)   01/11/21 180 lb (81.6 kg)   02/24/20 178 lb (80.7 kg)     Temp Readings from Last 3 Encounters:   01/11/21 97.6 °F (36.4 °C)   01/11/21 97.6 °F (36.4 °C) (Temporal)   01/07/20 98.1 °F (36.7 °C) (Temporal)     BP Readings from Last 3 Encounters:   01/11/21 118/84   01/11/21 118/84   02/24/20 122/80     Pulse Readings from Last 3 Encounters:   01/11/21 96   01/11/21 96   01/13/20 103       Mental Status Exam:   Appearance    alert, cooperative  Motor: No abnormal movements, tics or mannerisms.   Speech    spontaneous, normal rate and normal volume  Mood/Affect    anxious/fair motility and range  Thought Process    linear, goal directed and coherent  Thought Content    intact , no suicidal ideation  Associations    logical connections  Attention/Concentration    intact  Memory    recent and remote memory intact  Insight/Judgement    Good / Intact    Labs:     Orders Only on 01/11/2021   Component Date Value Ref Range Status    TSH Reflex FT4 01/11/2021 2.83  0.27 - 4.20 uIU/mL Final    Cholesterol, Total 01/11/2021 184  0 - 199 mg/dL Final    Triglycerides 01/11/2021 91  0 - 150 mg/dL Final    HDL 01/11/2021 58  40 - 60 mg/dL Final    LDL Calculated 01/11/2021 108* <100 mg/dL Final    VLDL Cholesterol Calculated 01/11/2021 18  Not Established mg/dL Final    WBC 01/11/2021 4.7  4.0 - 11.0 K/uL Final    RBC 01/11/2021 4.45  4.00 - 5.20 M/uL Final    Hemoglobin 01/11/2021 13.7  12.0 - 16.0 g/dL Final    Hematocrit 01/11/2021 41.4  36.0 - 48.0 % Final    MCV 01/11/2021 93.1  80.0 - 100.0 fL Final    MCH 01/11/2021 30.8  26.0 - 34.0 pg Final    MCHC 01/11/2021 33.1  31.0 - 36.0 g/dL Final    RDW 01/11/2021 12.4  12.4 - 15.4 % Final    Platelets 09/61/4547 221  135 - 450 K/uL Final    stress. It does seem the Strattera has been helpful in regards to her focus issues. However the safety of the combined use of this and duloxetine needs to be considered. She did have palpitations that higher dose of duloxetine which likely has resolved since dose reduction. She did have normal EKG and TSH, reviewed as well as the PCP notes from her last evaluation. If Strattera dose were to be increased, we would need to take this in account and plan to lower her duloxetine further. Given this comes with the risk of depression worsening, and recent issues with low mood, this may be a bad time to attempt this. 1. Major depressive disorder, recurrent, moderate  2. Trauma and stressor related disorder  3. R/o ADHD    4. Palpitationsresolved    P:   1. Continue duloxetine 90mg daily. 2. Continue atomoxetine 40mg daily  3. Contnue prazosin 1mg qhs.   4. Continue trazodone 50-75mg qhs prn sleep   5. Light therapy 98225 lux 30min qam.   6. Psychotherapy would be a future consideration. We have discussed this previously.   7. Seeing PCP today and will discuss palpitations    I spent 18 min on psychotherapy with the patient    Follow-up: RTC in 4 weeks      Ulises Corbett MD  Psychiatrist

## 2021-03-24 ENCOUNTER — VIRTUAL VISIT (OUTPATIENT)
Dept: PSYCHIATRY | Age: 46
End: 2021-03-24
Payer: OTHER GOVERNMENT

## 2021-03-24 DIAGNOSIS — F90.0 ATTENTION DEFICIT HYPERACTIVITY DISORDER (ADHD), PREDOMINANTLY INATTENTIVE TYPE: ICD-10-CM

## 2021-03-24 DIAGNOSIS — F33.0 MILD EPISODE OF RECURRENT MAJOR DEPRESSIVE DISORDER (HCC): Primary | ICD-10-CM

## 2021-03-24 DIAGNOSIS — F43.9 TRAUMA AND STRESSOR-RELATED DISORDER: ICD-10-CM

## 2021-03-24 PROCEDURE — G8427 DOCREV CUR MEDS BY ELIG CLIN: HCPCS | Performed by: PSYCHIATRY & NEUROLOGY

## 2021-03-24 PROCEDURE — 99214 OFFICE O/P EST MOD 30 MIN: CPT | Performed by: PSYCHIATRY & NEUROLOGY

## 2021-03-24 RX ORDER — DULOXETIN HYDROCHLORIDE 60 MG/1
60 CAPSULE, DELAYED RELEASE ORAL DAILY
Qty: 30 CAPSULE | Refills: 2 | Status: SHIPPED | OUTPATIENT
Start: 2021-03-24 | End: 2021-06-25 | Stop reason: SDUPTHER

## 2021-03-24 RX ORDER — ATOMOXETINE 60 MG/1
60 CAPSULE ORAL DAILY
Qty: 30 CAPSULE | Refills: 2 | Status: SHIPPED | OUTPATIENT
Start: 2021-03-24 | End: 2021-06-25 | Stop reason: SDUPTHER

## 2021-03-24 RX ORDER — PRAZOSIN HYDROCHLORIDE 1 MG/1
1 CAPSULE ORAL NIGHTLY
Qty: 30 CAPSULE | Refills: 5 | Status: SHIPPED | OUTPATIENT
Start: 2021-03-24 | End: 2021-11-19

## 2021-03-24 NOTE — PROGRESS NOTES
PSYCHIATRY PROGRESS NOTE  TELEHEALTH VISIT      Jeannette Enriquez  1975  03/24/21  Patient location: Home  Provider location: Office  PCP: NIKOLE Olivera CNP    CC:   Chief Complaint   Patient presents with    Follow-up         S:   This virtual visit was conducted with the patient's consent. Services were provided through video using [employer] me software. Patient reports general stability with her mood and anxiety. She does have some ongoing low mood that manifests with feeling overwhelmed, fatigue, unmotivated at times. She does feel she is struggling to manage all the responsibilities that she has and feels fatigued by the routine that she has to have to maintain her responsibilities (work, taking care of her kids, doctor's appointments, etc.). Did have a lot of anxiety going into recent LEEP procedure. Procedure went well, she is recovering fine, about to go back to work. Margins from pathology looked ok, and she is going to continue to follow routinely with her provider for further monitoring. She has not had any issues with rapid heartbeat, chest pain, palpitations. With atomoxetine she has noticed that her brain does not \"ping-pong\", as much and she feels more focused and organized with her thoughts. She notices she is a little bit more organized with her environment and initiating tasks is a little bit easier. He did have an opportunity to go to the zoo recently which she found to be very enjoyable, hopes to do more activities like this to detach from day-to-day stresses. ROS: No palpitations or chest pain. Brief Psych Hx:  Hosp: [facility] 2014, [facility] in 2016  Diagnoses: depression, anxiety  Meds: Wellbutrin (at first somewhat helpful), duloxetine (since 2015, helps joint pain), restoril, clonazepam. Early 20's tried sertraline ? Not helpful despite taking for a couple yrs. [facility]: [facility] [place] prior to [facility], [facility] NP through [facility].  Was seeing a therapist there as well but therapist left the practice. Suicide Attempts: twice (2014 - OD on medications, 2015 OD on medications)       Current Outpatient Medications   Medication Sig Dispense Refill    traZODone (DESYREL) 50 MG tablet Take 1-1.5 tablets by mouth nightly as needed for Sleep 45 tablet 5    cyanocobalamin 1000 MCG/ML injection INJECT ONE MILLILITER INTO THE MUSCLE EVERY 30 DAYS 1 mL 4    prazosin (MINIPRESS) 1 MG capsule TAKE ONE CAPSULE BY MOUTH ONCE NIGHTLY 30 capsule 2    amLODIPine (NORVASC) 10 MG tablet TAKE ONE TABLET BY MOUTH DAILY 90 tablet 1    atomoxetine (STRATTERA) 40 MG capsule TAKE ONE CAPSULE BY MOUTH DAILY 30 capsule 2    DULoxetine (CYMBALTA) 60 MG extended release capsule Take 1 capsule by mouth daily 30 capsule 2    DULoxetine (CYMBALTA) 30 MG extended release capsule Take 1 capsule by mouth daily Take with 60mg cap for total of 90mg daily 30 capsule 2    valsartan-hydroCHLOROthiazide (DIOVAN-HCT) 160-25 MG per tablet TAKE ONE TABLET BY MOUTH DAILY 90 tablet 1    oxyCODONE (ROXICODONE) 5 MG immediate release tablet       SYRINGE-NEEDLE, DISP, 3 ML 25G X 1\" 3 ML MISC 1 applicator by Does not apply route every 30 days 12 each 0    Insulin Pen Needle 31G X 5 MM MISC 1 each by Does not apply route daily 100 each 3    gabapentin (NEURONTIN) 400 MG capsule Take 1 capsule by mouth 3 times daily for 30 days. . (Patient taking differently: Take 800 mg by mouth 3 times daily. ) 90 capsule 3     No current facility-administered medications for this visit.         O:  Wt Readings from Last 3 Encounters:   01/11/21 180 lb (81.6 kg)   01/11/21 180 lb (81.6 kg)   02/24/20 178 lb (80.7 kg)     Temp Readings from Last 3 Encounters:   01/11/21 97.6 °F (36.4 °C)   01/11/21 97.6 °F (36.4 °C) (Temporal)   01/07/20 98.1 °F (36.7 °C) (Temporal)     BP Readings from Last 3 Encounters:   01/11/21 118/84   01/11/21 118/84   02/24/20 122/80     Pulse Readings from Last 3 Encounters: 01/11/21 96   01/11/21 96   01/13/20 103       Mental Status Exam:   Appearance    alert, cooperative  Motor: No abnormal movements, tics or mannerisms.   Speech    spontaneous, normal rate and normal volume  Mood/Affect    okay/fair motility and range  Thought Process    linear, goal directed and coherent  Thought Content    intact , no suicidal ideation  Associations    logical connections  Attention/Concentration    intact  Memory    recent and remote memory intact  Insight/Judgement    Good / Intact    Labs:     Orders Only on 01/11/2021   Component Date Value Ref Range Status    TSH Reflex FT4 01/11/2021 2.83  0.27 - 4.20 uIU/mL Final    Cholesterol, Total 01/11/2021 184  0 - 199 mg/dL Final    Triglycerides 01/11/2021 91  0 - 150 mg/dL Final    HDL 01/11/2021 58  40 - 60 mg/dL Final    LDL Calculated 01/11/2021 108* <100 mg/dL Final    VLDL Cholesterol Calculated 01/11/2021 18  Not Established mg/dL Final    WBC 01/11/2021 4.7  4.0 - 11.0 K/uL Final    RBC 01/11/2021 4.45  4.00 - 5.20 M/uL Final    Hemoglobin 01/11/2021 13.7  12.0 - 16.0 g/dL Final    Hematocrit 01/11/2021 41.4  36.0 - 48.0 % Final    MCV 01/11/2021 93.1  80.0 - 100.0 fL Final    MCH 01/11/2021 30.8  26.0 - 34.0 pg Final    MCHC 01/11/2021 33.1  31.0 - 36.0 g/dL Final    RDW 01/11/2021 12.4  12.4 - 15.4 % Final    Platelets 90/44/2943 221  135 - 450 K/uL Final    MPV 01/11/2021 10.5  5.0 - 10.5 fL Final    Neutrophils % 01/11/2021 53.2  % Final    Lymphocytes % 01/11/2021 34.1  % Final    Monocytes % 01/11/2021 9.4  % Final    Eosinophils % 01/11/2021 2.5  % Final    Basophils % 01/11/2021 0.8  % Final    Neutrophils Absolute 01/11/2021 2.5  1.7 - 7.7 K/uL Final    Lymphocytes Absolute 01/11/2021 1.6  1.0 - 5.1 K/uL Final    Monocytes Absolute 01/11/2021 0.4  0.0 - 1.3 K/uL Final    Eosinophils Absolute 01/11/2021 0.1  0.0 - 0.6 K/uL Final    Basophils Absolute 01/11/2021 0.0  0.0 - 0.2 K/uL Final    Sodium 01/11/2021 137  136 - 145 mmol/L Final    Potassium 01/11/2021 4.5  3.5 - 5.1 mmol/L Final    Chloride 01/11/2021 99  99 - 110 mmol/L Final    CO2 01/11/2021 27  21 - 32 mmol/L Final    Anion Gap 01/11/2021 11  3 - 16 Final    Glucose 01/11/2021 113* 70 - 99 mg/dL Final    BUN 01/11/2021 24* 7 - 20 mg/dL Final    CREATININE 01/11/2021 0.7  0.6 - 1.1 mg/dL Final    GFR Non- 01/11/2021 >60  >60 Final    Comment: >60 mL/min/1.73m2 EGFR, calc. for ages 25 and older using the  MDRD formula (not corrected for weight), is valid for stable  renal function.  GFR  01/11/2021 >60  >60 Final    Comment: Chronic Kidney Disease: less than 60 ml/min/1.73 sq.m. Kidney Failure: less than 15 ml/min/1.73 sq.m. Results valid for patients 18 years and older.  Calcium 01/11/2021 9.5  8.3 - 10.6 mg/dL Final    Total Protein 01/11/2021 6.8  6.4 - 8.2 g/dL Final    Albumin 01/11/2021 4.6  3.4 - 5.0 g/dL Final    Albumin/Globulin Ratio 01/11/2021 2.1  1.1 - 2.2 Final    Total Bilirubin 01/11/2021 0.4  0.0 - 1.0 mg/dL Final    Alkaline Phosphatase 01/11/2021 63  40 - 129 U/L Final    ALT 01/11/2021 25  10 - 40 U/L Final    AST 01/11/2021 22  15 - 37 U/L Final    Globulin 01/11/2021 2.2  g/dL Final       A:  40 yo F with depression and anxiety. Still having ongoing residual depressive symptoms and anxiety. Likely she would see improvement if she had more available free time to herself and/or more support for the responsibilities that she does have. It seems atomoxetine is helping in regards to focus issues, and we may try optimizing the dose further to see if this may improve her mood a little further. 1. Major depressive disorder, recurrent, mild  2. Trauma and stressor related disorder  3. ADHD, predominantly inattentive type      P:   1. Reduce duloxetine to 60 mg daily. If mood worsens, we should reconsider returning to prior dose. 2.  Increase atomoxetine to 60 mg daily. 3. Contnue prazosin 1mg qhs.   4. Continue trazodone 50-75mg qhs prn sleep   5.  Light therapy [id number] lux 30min qam.       Follow-up: RTC in 6 weeks      Gladys Manzanares MD  Psychiatrist

## 2021-03-30 ENCOUNTER — TELEPHONE (OUTPATIENT)
Dept: INTERNAL MEDICINE CLINIC | Age: 46
End: 2021-03-30

## 2021-03-30 NOTE — TELEPHONE ENCOUNTER
Called pt to schedule with Jen Pendleton on tomorrow for virtual visit. Her phone dropped and I called back had to lm on [employer]. Please schedule.

## 2021-03-31 ENCOUNTER — VIRTUAL VISIT (OUTPATIENT)
Dept: INTERNAL MEDICINE CLINIC | Age: 46
End: 2021-03-31
Payer: OTHER GOVERNMENT

## 2021-03-31 DIAGNOSIS — R68.83 CHILLS: ICD-10-CM

## 2021-03-31 DIAGNOSIS — R52 BODY ACHES: Primary | ICD-10-CM

## 2021-03-31 DIAGNOSIS — R61 DIAPHORESIS: ICD-10-CM

## 2021-03-31 PROCEDURE — 87804 INFLUENZA ASSAY W/OPTIC: CPT | Performed by: NURSE PRACTITIONER

## 2021-03-31 PROCEDURE — 1036F TOBACCO NON-USER: CPT | Performed by: NURSE PRACTITIONER

## 2021-03-31 PROCEDURE — G8427 DOCREV CUR MEDS BY ELIG CLIN: HCPCS | Performed by: NURSE PRACTITIONER

## 2021-03-31 PROCEDURE — G8417 CALC BMI ABV UP PARAM F/U: HCPCS | Performed by: NURSE PRACTITIONER

## 2021-03-31 PROCEDURE — G8482 FLU IMMUNIZE ORDER/ADMIN: HCPCS | Performed by: NURSE PRACTITIONER

## 2021-03-31 PROCEDURE — 99213 OFFICE O/P EST LOW 20 MIN: CPT | Performed by: NURSE PRACTITIONER

## 2021-03-31 ASSESSMENT — ENCOUNTER SYMPTOMS
SHORTNESS OF BREATH: 0
TROUBLE SWALLOWING: 0
VOMITING: 0
SORE THROAT: 0
SINUS PRESSURE: 1
EYE PAIN: 0
CONSTIPATION: 0
ABDOMINAL PAIN: 1
WHEEZING: 0
COUGH: 1
NAUSEA: 1
DIARRHEA: 1
RHINORRHEA: 1

## 2021-03-31 NOTE — PROGRESS NOTES
TELEHEALTH EVALUATION -- Audio/Visual (During REQME-10 public health emergency)  Acute Office Visit  3/31/2021    SUBJECTIVE:    Patient ID: Adry Small is a 41 y.o. female. Chief Complaint   Patient presents with    Nausea    Abdominal Pain    Generalized Body Aches     HPI: The patient presents for an acute visit. Pt reports that 2 days ago, she started with headaches, nausea, diarrhea, fever (100.3 highest), body aches, chills and sweating. States today, she is feeling better than she was the last two days. However, still having nasal congestion and body aches. Nausea resolving. Denies cough. No sore throat or ear pain. Denies CP, SOB or wheezing. Treatment tried and response - ibuprofen  No recent travel. Recent ill contacts? 8year old child was ill last week  Tobacco use or second hand smoke exposure - No  Condition better, worsening, or stable - Improving    No Known Allergies     Current Outpatient Medications   Medication Sig Dispense Refill    atomoxetine (STRATTERA) 60 MG capsule Take 1 capsule by mouth daily 30 capsule 2    prazosin (MINIPRESS) 1 MG capsule Take 1 capsule by mouth nightly 30 capsule 5    DULoxetine (CYMBALTA) 60 MG extended release capsule Take 1 capsule by mouth daily 30 capsule 2    traZODone (DESYREL) 50 MG tablet Take 1-1.5 tablets by mouth nightly as needed for Sleep 45 tablet 5    cyanocobalamin 1000 MCG/ML injection INJECT ONE MILLILITER INTO THE MUSCLE EVERY 30 DAYS 1 mL 4    amLODIPine (NORVASC) 10 MG tablet TAKE ONE TABLET BY MOUTH DAILY 90 tablet 1    valsartan-hydroCHLOROthiazide (DIOVAN-HCT) 160-25 MG per tablet TAKE ONE TABLET BY MOUTH DAILY 90 tablet 1    oxyCODONE (ROXICODONE) 5 MG immediate release tablet       gabapentin (NEURONTIN) 400 MG capsule Take 1 capsule by mouth 3 times daily for 30 days. . (Patient taking differently: Take 800 mg by mouth 3 times daily. ) 90 capsule 3    SYRINGE-NEEDLE, DISP, 3 ML 25G X 1\" 3 ML MISC 1 applicator by Does not apply route every 30 days 12 each 0    Insulin Pen Needle 31G X 5 MM MISC 1 each by Does not apply route daily 100 each 3     No current facility-administered medications for this visit. Review of Systems   Constitutional: Positive for chills, diaphoresis and fever. Negative for appetite change. Body aches   HENT: Positive for congestion, rhinorrhea and sinus pressure. Negative for drooling, ear discharge, ear pain, hearing loss, postnasal drip, sneezing, sore throat and trouble swallowing. Eyes: Negative for pain and visual disturbance. Respiratory: Positive for cough. Negative for shortness of breath and wheezing. Cardiovascular: Negative for chest pain and palpitations. Gastrointestinal: Positive for abdominal pain, diarrhea and nausea. Negative for constipation and vomiting. Skin: Negative for pallor. Neurological: Positive for headaches. Negative for dizziness and light-headedness. OBJECTIVE:  Video Virtual Visit  Patient-Reported Vitals 3/31/2021   Patient-Reported Weight 170 lb   Patient-Reported Height 5'2''   Patient-Reported Systolic -   Patient-Reported Diastolic -   Patient-Reported Pulse 86   Patient-Reported Temperature 97.2    ^no access to other VS d/t VV per pt. Physical Exam  Constitutional:       General: She is not in acute distress. Appearance: Normal appearance. She is not ill-appearing. Pulmonary:      Effort: Pulmonary effort is normal. No respiratory distress. Skin:     Coloration: Skin is not jaundiced or pale. Neurological:      Mental Status: She is alert. Comments: No facial asymmetry noted   Psychiatric:         Mood and Affect: Mood normal.     Due to this being a TeleHealth encounter, evaluation of the following organ systems is limited: Vitals/Constitutional/EENT/Resp/CV/GI//MS/Neuro/Skin/Heme-Lymph-Imm. ASSESSMENT/PLAN:  Lashon Santiago was seen today for nausea, abdominal pain and generalized body aches.     Diagnoses and all orders for this visit:    Body aches/Chills/Diaphoresis  -    Recommend COVID-19 and influenza testing.   - Symptomatic management reviewed  - POCT Influenza A/B  - Patient will call if symptoms worsen or fail to improve  - Red flag warning signs reviewed with the patient and she will go to the ER if these occur  - Work letter written per patient request.    Return for as previously scheduled or sooner if needed. Eliza Galloway is a 41 y.o. female being evaluated by a Virtual Visit (video visit) encounter to address concerns as mentioned above. A caregiver was present when appropriate. Due to this being a TeleHealth encounter (During XBGTP-63 public health emergency), evaluation of the following organ systems was limited: Vitals/Constitutional/EENT/Resp/CV/GI//MS/Neuro/Skin/Heme-Lymph-Imm. Pursuant to the emergency declaration under the [address] [place] [address] and the [employer] Act, this Virtual Visit was conducted with patient's (and/or legal guardian's) consent, to reduce the patient's risk of exposure to COVID-19 and provide necessary medical care. The patient (and/or legal guardian) has also been advised to contact this office for worsening conditions or problems, and seek emergency medical treatment and/or call 911 if deemed necessary. Patient identification was verified at the start of the visit: Yes    Total time spent on this encounter: Not billed by time    Services were provided through a video synchronous discussion virtually to substitute for in-person clinic visit. Patient and provider were located at their individual homes. All questions answered. Pt states no further questions or concerns at this time.    Electronically signed by: William Ross 03/31/21

## 2021-04-02 LAB
INFLUENZA A ANTIBODY: NORMAL
INFLUENZA B ANTIBODY: NORMAL

## 2021-04-05 ENCOUNTER — TELEPHONE (OUTPATIENT)
Dept: INTERNAL MEDICINE CLINIC | Age: 46
End: 2021-04-05

## 2021-04-05 NOTE — TELEPHONE ENCOUNTER
Patient states she saw Jimmy Naik on 3/31/21. At that time she declined nausea medication but now states she does need something for nausea. Patient uses [address] [facility] on [facility].

## 2021-04-06 RX ORDER — ONDANSETRON 4 MG/1
4 TABLET, FILM COATED ORAL DAILY PRN
Qty: 10 TABLET | Refills: 0 | Status: SHIPPED | OUTPATIENT
Start: 2021-04-06 | End: 2022-03-25 | Stop reason: ALTCHOICE

## 2021-04-06 NOTE — TELEPHONE ENCOUNTER
10 tablets prescribed of nausea medications as needed.   If she is still symptomatic, I recommend evaluation

## 2021-04-07 ENCOUNTER — TELEPHONE (OUTPATIENT)
Dept: INTERNAL MEDICINE CLINIC | Age: 46
End: 2021-04-07

## 2021-04-07 NOTE — TELEPHONE ENCOUNTER
Patient is requesting a letter to employer that states she was tested for Covid-19 on 4/2/21 and she can return to work on 4/12/21. She states employer has told her she has to be off for 10 days from when she was tested. Patient requested letter be faxed to #[phone] attn: Evan.

## 2021-04-07 NOTE — LETTER
[facility] Internal Medicine  [address] [address]  Phone: [phone]  Fax: [phone]    NIKOLE Hdez CNP        April 7, 2021     Patient: Nicole Leslie   YOB: 1975   Date of Visit: 4/7/2021       To Whom it May Concern:    Nicole Leslie may return to work on 4/8/2021. If you have any questions or concerns, please don't hesitate to call.     Sincerely,         NIKOLE Hdez CNP

## 2021-04-07 NOTE — TELEPHONE ENCOUNTER
Please call and inform the patient that based off CDC criteria, the patient may return to work tomorrow. Return to work note written.

## 2021-04-07 NOTE — TELEPHONE ENCOUNTER
Spoke with patient. Her symptoms have improved and she has been fever free the last 24 hours and has not used any fever-reducing medication.  Patient requests for test results to remain confidential.

## 2021-05-02 DIAGNOSIS — I10 ESSENTIAL HYPERTENSION: ICD-10-CM

## 2021-05-03 RX ORDER — VALSARTAN AND HYDROCHLOROTHIAZIDE 160; 25 MG/1; MG/1
TABLET ORAL
Qty: 30 TABLET | Refills: 0 | Status: SHIPPED | OUTPATIENT
Start: 2021-05-03 | End: 2021-06-11

## 2021-05-09 ENCOUNTER — HOSPITAL ENCOUNTER (OUTPATIENT)
Dept: CT IMAGING | Age: 46
Discharge: HOME | End: 2021-05-09
Payer: OTHER GOVERNMENT

## 2021-05-09 DIAGNOSIS — S32.001A STABLE BURST FRACTURE OF UNSPECIFIED LUMBAR VERTEBRA, INITIAL ENCOUNTER FOR CLOSED FRACTURE (HCC): ICD-10-CM

## 2021-05-09 PROCEDURE — 72131 CT LUMBAR SPINE W/O DYE: CPT

## 2021-05-09 PROCEDURE — 72128 CT CHEST SPINE W/O DYE: CPT

## 2021-05-17 ENCOUNTER — NURSE TRIAGE (OUTPATIENT)
Dept: OTHER | Facility: CLINIC | Age: 46
End: 2021-05-17

## 2021-05-17 ENCOUNTER — TELEPHONE (OUTPATIENT)
Dept: INTERNAL MEDICINE CLINIC | Age: 46
End: 2021-05-17

## 2021-05-17 NOTE — TELEPHONE ENCOUNTER
Patient experiencing diverticulitis flare up. She is unable to see specialist until June. Patient c/o severe pain on L side, chills, and is feverish. Currently trying to stay on liquid diet. Has been taking ibuprofen- no relief. Please advise.

## 2021-05-17 NOTE — TELEPHONE ENCOUNTER
Received call from Carol Ann at pre-service center [facility]) Sadia with Red Flag Complaint. Brief description of triage: see below    Triage indicates for patient to see HCP within 4 hours (or PCP triage). Recommended ED to pt and she was agreeable to POC. Care advice provided, patient verbalizes understanding; denies any other questions or concerns; instructed to call back for any new or worsening symptoms. Attention Provider: Thank you for allowing me to participate in the care of your patient. The patient was connected to triage in response to information provided to the ECC. Please do not respond through this encounter as the response is not directed to a shared pool. Reason for Disposition   Pain is mainly in upper abdomen  (if needed ask: \"is it mainly above the belly button? \")   [1] MILD-MODERATE pain AND [2] constant AND [3] present > 2 hours    Answer Assessment - Initial Assessment Questions  1. LOCATION: \"Where does it hurt? \"       Left upper quadrant    2. RADIATION: \"Does the pain shoot anywhere else? \" (e.g., chest, back)      Pt denies    3. ONSET: \"When did the pain begin? \" (e.g., minutes, hours or days ago)       05/15 am    4. SUDDEN: \"Gradual or sudden onset? \"      Pt states she woke up Saturday morning with issues with it    5. PATTERN \"Does the pain come and go, or is it constant? \"     - If constant: \"Is it getting better, staying the same, or worsening? \"       (Note: Constant means the pain never goes away completely; most serious pain is constant and it progresses)      - If intermittent: \"How long does it last?\" \"Do you have pain now? \"      (Note: Intermittent means the pain goes away completely between bouts)      Pt states constant    6. SEVERITY: \"How bad is the pain? \"  (e.g., Scale 1-10; mild, moderate, or severe)    - MILD (1-3): doesn't interfere with normal activities, abdomen soft and not tender to touch     - MODERATE (4-7): interferes with normal activities or awakens from sleep, tender to touch     - SEVERE (8-10): excruciating pain, doubled over, unable to do any normal activities       Pt states dull aching pain 6/10    7. RECURRENT SYMPTOM: \"Have you ever had this type of abdominal pain before? \" If so, ask: \"When was the last time? \" and \"What happened that time? \"       Pt states hx diverticulitis and feels the same    8. CAUSE: \"What do you think is causing the abdominal pain? \"      Pt states it feels like diverticulitis     9. RELIEVING/AGGRAVATING FACTORS: \"What makes it better or worse? \" (e.g., movement, antacids, bowel movement)      Pt denies    10. OTHER SYMPTOMS: \"Has there been any vomiting, diarrhea, constipation, or urine problems? \"        States she feels feverish (has been taking Ibuprofen); denies diarrhea/constipation    11. PREGNANCY: \"Is there any chance you are pregnant? \" \"When was your last menstrual period? \"        Pt denies; LMP March 2021    Protocols used: ABDOMINAL PAIN - FEMALE-ADULT-, ABDOMINAL PAIN - UPPER-ADULT-AH

## 2021-06-11 DIAGNOSIS — I10 ESSENTIAL HYPERTENSION: ICD-10-CM

## 2021-06-11 RX ORDER — VALSARTAN AND HYDROCHLOROTHIAZIDE 160; 25 MG/1; MG/1
TABLET ORAL
Qty: 30 TABLET | Refills: 5 | Status: SHIPPED | OUTPATIENT
Start: 2021-06-11 | End: 2022-02-04

## 2021-06-25 ENCOUNTER — OFFICE VISIT (OUTPATIENT)
Dept: PSYCHIATRY | Age: 46
End: 2021-06-25
Payer: OTHER GOVERNMENT

## 2021-06-25 VITALS
HEIGHT: 62 IN | HEART RATE: 100 BPM | BODY MASS INDEX: 31.28 KG/M2 | SYSTOLIC BLOOD PRESSURE: 120 MMHG | TEMPERATURE: 97.6 F | OXYGEN SATURATION: 99 % | DIASTOLIC BLOOD PRESSURE: 80 MMHG | WEIGHT: 170 LBS

## 2021-06-25 DIAGNOSIS — F43.9 TRAUMA AND STRESSOR-RELATED DISORDER: ICD-10-CM

## 2021-06-25 DIAGNOSIS — F90.0 ATTENTION DEFICIT HYPERACTIVITY DISORDER (ADHD), PREDOMINANTLY INATTENTIVE TYPE: ICD-10-CM

## 2021-06-25 DIAGNOSIS — F33.0 MILD EPISODE OF RECURRENT MAJOR DEPRESSIVE DISORDER (HCC): Primary | ICD-10-CM

## 2021-06-25 PROCEDURE — 1036F TOBACCO NON-USER: CPT | Performed by: PSYCHIATRY & NEUROLOGY

## 2021-06-25 PROCEDURE — 99214 OFFICE O/P EST MOD 30 MIN: CPT | Performed by: PSYCHIATRY & NEUROLOGY

## 2021-06-25 PROCEDURE — 90833 PSYTX W PT W E/M 30 MIN: CPT | Performed by: PSYCHIATRY & NEUROLOGY

## 2021-06-25 PROCEDURE — G8417 CALC BMI ABV UP PARAM F/U: HCPCS | Performed by: PSYCHIATRY & NEUROLOGY

## 2021-06-25 PROCEDURE — G8427 DOCREV CUR MEDS BY ELIG CLIN: HCPCS | Performed by: PSYCHIATRY & NEUROLOGY

## 2021-06-25 RX ORDER — ATOMOXETINE 60 MG/1
60 CAPSULE ORAL DAILY
Qty: 30 CAPSULE | Refills: 2 | Status: SHIPPED | OUTPATIENT
Start: 2021-06-25 | End: 2021-10-01

## 2021-06-25 RX ORDER — DULOXETIN HYDROCHLORIDE 60 MG/1
60 CAPSULE, DELAYED RELEASE ORAL DAILY
Qty: 30 CAPSULE | Refills: 2 | Status: SHIPPED | OUTPATIENT
Start: 2021-06-25 | End: 2021-12-20 | Stop reason: SDUPTHER

## 2021-06-25 NOTE — PROGRESS NOTES
PSYCHIATRY PROGRESS NOTE        Melissa Montoya  1975  06/25/21  PCP: NIKOLE Wells - CNP    CC:   Chief Complaint   Patient presents with    Depression    Anxiety         S:   Pt is tolerating current medication treatment ok. HR does seem to be high frequently. However she is not having palpitations like she was previously, denies chest pain. Is feeling better with her anxiety / mood as she is noticing her daughter is being less difficult with her behavior. She's not sure why the change, but she is pleased to see her being less oppositional towards her. Did go to a couple family therapy sessions with Kiya Mckee with intention to discuss her daughter. The first two went well, however the last one she felt went poorly, she didn't like the therapist's approach, she was upset and tearful and doesn't want to go back. She is stressed about her older daughter going forward with mastectomy as part of her transgender F->M change. It's been hard for her to process the change - feels she is grieving the loss of her daughter. THERAPY GOALS: Reduce anxiety/depression  THERAPY MODALITIES: behavioral, supportive  THERAPY NOTES: we explored feelings patient had towards the couples counselor. Validated many of the feelings she was having regarding the interaction and helped clarify the feelings of being judged, singled out, her side not being taken. We discussed the benefits of returning, expressing how the interaction made her feel in the therapy setting, and how this could demonstrate confidence and improve how she handles situations with other people in her life. Discussed benefits of opening communication with her daughter. ROS: No palpitations or chest pain. Brief Psych Hx:  [facility], [facility] [place]  Diagnoses: depression, anxiety  Meds: Wellbutrin (at first somewhat helpful), duloxetine (since 2015, helps joint pain), restoril, clonazepam. Early 20's tried sertraline ?  Not helpful despite taking for a couple yrs. [facility]: [facility] to [facility], [facility] since 2015 NP through [facility] Was seeing a therapist there as well but therapist left the practice. Suicide Attempts: twice (2014 - OD on medications, 2015 OD on medications)       Current Outpatient Medications   Medication Sig Dispense Refill    valsartan-hydroCHLOROthiazide (DIOVAN-HCT) 160-25 MG per tablet TAKE ONE TABLET BY MOUTH DAILY 30 tablet 5    ondansetron (ZOFRAN) 4 MG tablet Take 1 tablet by mouth daily as needed for Nausea or Vomiting 10 tablet 0    atomoxetine (STRATTERA) 60 MG capsule Take 1 capsule by mouth daily 30 capsule 2    prazosin (MINIPRESS) 1 MG capsule Take 1 capsule by mouth nightly 30 capsule 5    DULoxetine (CYMBALTA) 60 MG extended release capsule Take 1 capsule by mouth daily 30 capsule 2    traZODone (DESYREL) 50 MG tablet Take 1-1.5 tablets by mouth nightly as needed for Sleep 45 tablet 5    cyanocobalamin 1000 MCG/ML injection INJECT ONE MILLILITER INTO THE MUSCLE EVERY 30 DAYS 1 mL 4    amLODIPine (NORVASC) 10 MG tablet TAKE ONE TABLET BY MOUTH DAILY 90 tablet 1    SYRINGE-NEEDLE, DISP, 3 ML 25G X 1\" 3 ML MISC 1 applicator by Does not apply route every 30 days 12 each 0    Insulin Pen Needle 31G X 5 MM MISC 1 each by Does not apply route daily 100 each 3    gabapentin (NEURONTIN) 400 MG capsule Take 1 capsule by mouth 3 times daily for 30 days. . (Patient taking differently: Take 800 mg by mouth 3 times daily. ) 90 capsule 3    oxyCODONE (ROXICODONE) 5 MG immediate release tablet  (Patient not taking: Reported on 6/25/2021)       No current facility-administered medications for this visit.        O:  Wt Readings from Last 3 Encounters:   06/25/21 170 lb (77.1 kg)   01/11/21 180 lb (81.6 kg)   01/11/21 180 lb (81.6 kg)     Temp Readings from Last 3 Encounters:   06/25/21 97.6 °F (36.4 °C)   01/11/21 97.6 °F (36.4 °C)   01/11/21 97.6 °F (36.4 °C) (Temporal)     BP Continue trazodone 50-75mg qhs prn sleep   5. Light therapy 85731 lux 30min qam.     I spent 20 min on psychotherapy with the patient.      Follow-up: RTC in 6 weeks      Gorge Ovalle MD  Psychiatrist

## 2021-08-04 RX ORDER — TRAZODONE HYDROCHLORIDE 50 MG/1
50-75 TABLET ORAL NIGHTLY PRN
Qty: 45 TABLET | Refills: 5 | Status: SHIPPED | OUTPATIENT
Start: 2021-08-04 | End: 2022-03-25

## 2021-08-10 DIAGNOSIS — E53.8 VITAMIN B12 DEFICIENCY: ICD-10-CM

## 2021-08-10 DIAGNOSIS — I10 ESSENTIAL HYPERTENSION: ICD-10-CM

## 2021-08-10 RX ORDER — CYANOCOBALAMIN 1000 UG/ML
INJECTION INTRAMUSCULAR; SUBCUTANEOUS
Qty: 1 ML | Refills: 4 | Status: SHIPPED | OUTPATIENT
Start: 2021-08-10 | End: 2022-02-04

## 2021-08-10 RX ORDER — AMLODIPINE BESYLATE 10 MG/1
TABLET ORAL
Qty: 90 TABLET | Refills: 1 | Status: SHIPPED | OUTPATIENT
Start: 2021-08-10 | End: 2022-02-11

## 2021-08-31 NOTE — TELEPHONE ENCOUNTER
Spoke with patient. Other symptoms have improved. The nausea comes and goes, but it is bothersome. Most recent episode occurred before I called her. Glycopyrrolate Pregnancy And Lactation Text: This medication is Pregnancy Category B and is considered safe during pregnancy. It is unknown if it is excreted breast milk.

## 2021-10-01 RX ORDER — ATOMOXETINE 60 MG/1
CAPSULE ORAL
Qty: 30 CAPSULE | Refills: 2 | Status: SHIPPED | OUTPATIENT
Start: 2021-10-01 | End: 2022-01-10

## 2021-10-19 ENCOUNTER — OFFICE VISIT (OUTPATIENT)
Dept: INTERNAL MEDICINE CLINIC | Age: 46
End: 2021-10-19
Payer: OTHER GOVERNMENT

## 2021-10-19 VITALS
DIASTOLIC BLOOD PRESSURE: 68 MMHG | BODY MASS INDEX: 32.19 KG/M2 | OXYGEN SATURATION: 97 % | HEART RATE: 80 BPM | WEIGHT: 176 LBS | TEMPERATURE: 97.7 F | SYSTOLIC BLOOD PRESSURE: 136 MMHG

## 2021-10-19 DIAGNOSIS — Z01.818 PREOP EXAMINATION: Primary | ICD-10-CM

## 2021-10-19 DIAGNOSIS — G56.02 LEFT CARPAL TUNNEL SYNDROME: ICD-10-CM

## 2021-10-19 DIAGNOSIS — R35.0 URINARY FREQUENCY: ICD-10-CM

## 2021-10-19 LAB
BACTERIA: ABNORMAL /HPF
BILIRUBIN URINE: NEGATIVE
BLOOD, URINE: NEGATIVE
CLARITY: ABNORMAL
COLOR: YELLOW
EPITHELIAL CELLS, UA: 4 /HPF (ref 0–5)
GLUCOSE URINE: NEGATIVE MG/DL
HYALINE CASTS: 2 /LPF (ref 0–8)
KETONES, URINE: NEGATIVE MG/DL
LEUKOCYTE ESTERASE, URINE: ABNORMAL
MICROSCOPIC EXAMINATION: YES
NITRITE, URINE: POSITIVE
PH UA: 7 (ref 5–8)
PROTEIN UA: 30 MG/DL
RBC UA: 2 /HPF (ref 0–4)
SPECIFIC GRAVITY UA: 1.02 (ref 1–1.03)
URINE REFLEX TO CULTURE: YES
URINE TYPE: ABNORMAL
UROBILINOGEN, URINE: 0.2 E.U./DL
WBC UA: 198 /HPF (ref 0–5)

## 2021-10-19 PROCEDURE — G8484 FLU IMMUNIZE NO ADMIN: HCPCS | Performed by: NURSE PRACTITIONER

## 2021-10-19 PROCEDURE — 1036F TOBACCO NON-USER: CPT | Performed by: NURSE PRACTITIONER

## 2021-10-19 PROCEDURE — G8427 DOCREV CUR MEDS BY ELIG CLIN: HCPCS | Performed by: NURSE PRACTITIONER

## 2021-10-19 PROCEDURE — 99214 OFFICE O/P EST MOD 30 MIN: CPT | Performed by: NURSE PRACTITIONER

## 2021-10-19 PROCEDURE — G8417 CALC BMI ABV UP PARAM F/U: HCPCS | Performed by: NURSE PRACTITIONER

## 2021-10-19 RX ORDER — PREGABALIN 100 MG/1
100 CAPSULE ORAL 2 TIMES DAILY
COMMUNITY
End: 2022-02-24

## 2021-10-19 NOTE — PROGRESS NOTES
Preoperative Consultation      Virginia Hancock  YOB: 1975    Date of Service:  10/19/2021    This patient presents today at the request of the surgeon for a preoperative consultation. Surgeon: Dr. Isa Gaspar  Indication for surgery: Left carpal tunnel  Scheduled procedure: Left carpal tunnel  Date of surgery: 10/28/21  Location of surgery: [facility]  Indicated/required preoperative testing: H&P  Smoker: No  Previous anesthesia complications: No    Family history of anesthesia complications: No  Loose, capped or false teeth: No  Recent chest pain or SOB: No  Known Bleeding Risk: No recent or remote history of abnormal bleeding  Personal or FH of DVT/PE: No    Patient objection to receiving blood products: No      No Known Allergies      Current Outpatient Medications   Medication Sig Dispense Refill    pregabalin (LYRICA) 100 MG capsule Take 100 mg by mouth 2 times daily.       atomoxetine (STRATTERA) 60 MG capsule TAKE ONE CAPSULE BY MOUTH DAILY 30 capsule 2    amLODIPine (NORVASC) 10 MG tablet TAKE ONE TABLET BY MOUTH DAILY 90 tablet 1    cyanocobalamin 1000 MCG/ML injection INJECT ONE MILLILITER INTO THE MUSCLE EVERY 30 DAYS 1 mL 4    traZODone (DESYREL) 50 MG tablet Take 1-1.5 tablets by mouth nightly as needed for Sleep 45 tablet 5    DULoxetine (CYMBALTA) 60 MG extended release capsule Take 1 capsule by mouth daily 30 capsule 2    valsartan-hydroCHLOROthiazide (DIOVAN-HCT) 160-25 MG per tablet TAKE ONE TABLET BY MOUTH DAILY 30 tablet 5    ondansetron (ZOFRAN) 4 MG tablet Take 1 tablet by mouth daily as needed for Nausea or Vomiting 10 tablet 0    prazosin (MINIPRESS) 1 MG capsule Take 1 capsule by mouth nightly 30 capsule 5    oxyCODONE (ROXICODONE) 5 MG immediate release tablet  (Patient not taking: Reported on 6/25/2021)      SYRINGE-NEEDLE, DISP, 3 ML 25G X 1\" 3 ML MISC 1 applicator by Does not apply route every 30 days 12 each 0    Insulin Pen Needle 31G X 5 MM MISC 1 each by Does not apply route daily 100 each 3     No current facility-administered medications for this visit. Patient Active Problem List   Diagnosis    Essential hypertension    Multiple trauma    Closed fracture of left distal radius    Injury of triangular fibrocartilage complex of left wrist    Left wrist tendonitis    Recurrent major depressive disorder, in partial remission ([facility] [address]    Anxiety    Class 1 obesity due to excess calories with serious comorbidity and body mass index (BMI) of 33.0 to 33.9 in adult    S/P bunionectomy       Past Medical History:   Diagnosis Date    Anxiety     Depression     Hypertension     Insomnia        Past Surgical History:   Procedure Laterality Date    BACK SURGERY      RODS AND SCREWS IN PLACE    BUNIONECTOMY Right 11/11/2019    LAPIDUS BUNIONECTOMY RIGHT FOOT WITH [address] OSTEOTOMY - MONI performed by Guy Maurice DPM at [place] [address]         Family History   Problem Relation Age of Onset    High Blood Pressure Mother          Review of Systems  A comprehensive review of systems was negative except for: Genitourinary: positive for frequency       Physical Exam   Vitals:    10/19/21 1154   BP: 136/68   Pulse: 80   Temp: 97.7 °F (36.5 °C)   SpO2: 97%   Weight: 176 lb (79.8 kg)        Constitutional: She is oriented to person, place, and time. She appears well-developed and well-nourished. No distress. HENT:   Head: Normocephalic and atraumatic. Mouth/Throat: Uvula is midline, oropharynx is clear and moist and mucous membranes are normal.   Eyes: Conjunctivae and EOM are normal.   Neck: Trachea normal and normal range of motion. Neck supple. Cardiovascular: Normal rate, regular rhythm, normal heart sounds and intact distal pulses. Pulmonary/Chest: Effort normal and breath sounds normal. No respiratory distress. She has no wheezes. She has no rales. Abdominal: Soft, non-tender.  Bowel sounds are normal. Musculoskeletal: She exhibits no edema and no tenderness. Neurological: She is alert and oriented to person, place, and time. Skin: Skin is warm and dry. No rash noted. No erythema. Psychiatric: She has a normal mood and affect. Her behavior is normal.       EKG Interpretation: n/a  Lab Review:  Lab Results   Component Value Date     01/11/2021    K 4.5 01/11/2021    CL 99 01/11/2021    CO2 27 01/11/2021    BUN 24 01/11/2021    CREATININE 0.7 01/11/2021    GLUCOSE 113 01/11/2021    CALCIUM 9.5 01/11/2021     Lab Results   Component Value Date    WBC 4.7 01/11/2021    HGB 13.7 01/11/2021    HCT 41.4 01/11/2021    MCV 93.1 01/11/2021     01/11/2021         Assessment:     40 y.o. patient with planned surgery as above. Known risk factors for perioperative complications: Hypertension     Plan:     1. Preoperative workup as follows: urinalysis (urinary tract instrumentation planned)  2. Urinary frequency/urgency: UA today and will treat accordingly   3. Change in medication regimen before surgery: None  4. Prophylaxis for cardiac events with perioperative beta-blockers: Not indicated  5. Deep vein thrombosis prophylaxis: regimen to be chosen by surgical team  6. No contraindications to planned surgery.       Rahel Worthy, [address] [place] [address],[address] Internal Medicine and Rheumatology  [phone]

## 2021-10-21 LAB
ORGANISM: ABNORMAL
URINE CULTURE, ROUTINE: ABNORMAL

## 2021-10-22 DIAGNOSIS — N30.00 ACUTE CYSTITIS WITHOUT HEMATURIA: Primary | ICD-10-CM

## 2021-10-22 RX ORDER — CIPROFLOXACIN 500 MG/1
500 TABLET, FILM COATED ORAL 2 TIMES DAILY
Qty: 6 TABLET | Refills: 0 | Status: SHIPPED | OUTPATIENT
Start: 2021-10-22 | End: 2021-10-25

## 2021-10-26 ENCOUNTER — TELEPHONE (OUTPATIENT)
Dept: INTERNAL MEDICINE CLINIC | Age: 46
End: 2021-10-26

## 2021-10-26 NOTE — TELEPHONE ENCOUNTER
----- Message from Sury Colon sent at 10/26/2021 12:35 PM EDT -----  Subject: Message to Provider    QUESTIONS  Information for Provider? Pt would like to schedule appt with psychiatrist   Nayan Lynne MD. Please reach back out to pt before the end of today   10/26, reached out to office and got no response within 2 mins; spoke with   PCP Marisol Monroy   ---------------------------------------------------------------------------  --------------  [address]  What is the best way for the office to contact you? OK to leave message on   voicemail  Preferred Call Back Phone Number?  [phone]  ---------------------------------------------------------------------------  --------------  SCRIPT ANSWERS  undefined

## 2021-10-26 NOTE — TELEPHONE ENCOUNTER
LVM for pt to call office to schedule appt. If pt returns call please schedule first available with Dr. Justice Truong.

## 2021-10-26 NOTE — TELEPHONE ENCOUNTER
----- Message from Timothy Mccracken sent at 10/26/2021 11:59 AM EDT -----  Subject: Message to Provider    QUESTIONS  Information for Provider? Pt would like to schedule appt with psychiatrist   Jimmy Napoles MD. Please reach back out to pt before the end of today   10/26, reached out to office and got no respone within 2 mins   ---------------------------------------------------------------------------  --------------  CALL BACK INFO  What is the best way for the office to contact you? OK to leave message on   voicemail  Preferred Call Back Phone Number? [phone]  ---------------------------------------------------------------------------  --------------  SCRIPT ANSWERS  Relationship to Patient?  Self

## 2021-10-26 NOTE — TELEPHONE ENCOUNTER
ELISSAM for pt to schedule appt. Please schedule first available with Dr. Dunne Dials if pt returns call.

## 2021-11-19 RX ORDER — PRAZOSIN HYDROCHLORIDE 1 MG/1
CAPSULE ORAL
Qty: 30 CAPSULE | Refills: 5 | Status: SHIPPED | OUTPATIENT
Start: 2021-11-19 | End: 2022-01-21 | Stop reason: ALTCHOICE

## 2021-11-29 ENCOUNTER — TELEPHONE (OUTPATIENT)
Dept: INTERNAL MEDICINE CLINIC | Age: 46
End: 2021-11-29

## 2021-11-29 NOTE — TELEPHONE ENCOUNTER
Patient calling with symptoms of nasal congestion, sore throat, SOB, and fatigue. States it feels similar to when she had bronchitis. Advised her that no appointments were available. Could something be called into [address] [facility] on [place] for her symptoms? States she will do anything Jessica Viarmontes recommends since she is wanting to go back to work this Wednesday. Call back number [phone].

## 2021-11-29 NOTE — TELEPHONE ENCOUNTER
Started about a week ago. Pt. Has not had COVID or Flu vaccines. No known exposures to ill contacts.  I recommended supportive care as listed below and patient will do that and go to urgent care if needed

## 2021-11-29 NOTE — TELEPHONE ENCOUNTER
How long has she had symptoms? Has she been vaccinated for flu and/or covid? Has she had any known exposures to ill contacts? I recommend supportive care as listed below.    If worsening or not improving we can offer a VV for Weds morning   ThanksEver     Supportive care:   Humidified air  Honey lemon tea  Nasal rinse prn  Flonase daily  NSAIDs/ tylenol for pain and fever  Mucinex prn for congestion

## 2021-12-02 ENCOUNTER — TELEPHONE (OUTPATIENT)
Dept: INTERNAL MEDICINE CLINIC | Age: 46
End: 2021-12-02

## 2021-12-02 NOTE — TELEPHONE ENCOUNTER
Pt. Is not active and mychart and spoke to pt. Monday she was going to urgent care because we were booked on she was told she will need seen by us before we can get her a note.

## 2021-12-02 NOTE — TELEPHONE ENCOUNTER
----- Message from Annmarie Area sent at 12/1/2021  1:01 PM EST -----  Subject: Message to Provider    QUESTIONS  Information for Provider? Patient needs a return to work note. Please   follow up. She can receive it through [employer].  ---------------------------------------------------------------------------  --------------  CALL BACK INFO  What is the best way for the office to contact you? OK to leave message on   voicemail  Preferred Call Back Phone Number? [phone]  ---------------------------------------------------------------------------  --------------  SCRIPT ANSWERS  Relationship to Patient?  Self

## 2021-12-20 ENCOUNTER — TELEPHONE (OUTPATIENT)
Dept: INTERNAL MEDICINE CLINIC | Age: 46
End: 2021-12-20

## 2021-12-20 RX ORDER — DULOXETIN HYDROCHLORIDE 60 MG/1
60 CAPSULE, DELAYED RELEASE ORAL DAILY
Qty: 30 CAPSULE | Refills: 0 | Status: SHIPPED | OUTPATIENT
Start: 2021-12-20 | End: 2022-01-17

## 2021-12-20 NOTE — TELEPHONE ENCOUNTER
Patient calling requesting refill of DULoxetine (CYMBALTA) 60 MG extended release capsule    Last written 06/25/21  Last OV 06/25/21  Next OV 01/21/22  Last recommended OV 08/06/21     Please send to [facility] on [address] in [place]. Patient states she will be out of the medication on Wednesday 12/22/21.

## 2021-12-29 ENCOUNTER — TELEPHONE (OUTPATIENT)
Dept: INTERNAL MEDICINE CLINIC | Age: 46
End: 2021-12-29

## 2021-12-29 DIAGNOSIS — Z20.822 SUSPECTED COVID-19 VIRUS INFECTION: Primary | ICD-10-CM

## 2021-12-29 NOTE — TELEPHONE ENCOUNTER
Patient is requesting to get a covid test at [facility] office tomorrow. She states covid symptoms started 12/27/21 in the evening. She was notified a coworker tested positive for covid yesterday and that 4 other people are out sick. She states she has fever, chills, headache, body aches and sore throat. Patient states she has not been vaccinated. Please call patient if test ordered so she will know to go tomorrow.

## 2021-12-30 DIAGNOSIS — Z20.822 SUSPECTED COVID-19 VIRUS INFECTION: ICD-10-CM

## 2021-12-31 LAB — SARS-COV-2: DETECTED

## 2022-01-03 ENCOUNTER — TELEPHONE (OUTPATIENT)
Dept: INTERNAL MEDICINE CLINIC | Age: 47
End: 2022-01-03

## 2022-01-03 NOTE — TELEPHONE ENCOUNTER
Pt calling got sick last Tues had covid test on Thursday---it is positive ---needs note sent to work stating she is positive---please fax to [phone]. Thanks.

## 2022-01-03 NOTE — LETTER
[facility]  [address] [address]  Phone: [phone]  Fax: [phone]    NIKOLE Tuttle CNP        January 3, 2022     Patient: Yaritza Sparrow   YOB: 1975   Date of Visit: 1/3/2022       To Whom It May Concern:    Yaritza Sparrow tested positive for covid 19 on 12/30/2021 and will follow CDC guidelines on returning to work. .    If you have any questions or concerns, please don't hesitate to call.     Sincerely,         NIKOLE Tuttle CNP

## 2022-01-17 RX ORDER — DULOXETIN HYDROCHLORIDE 60 MG/1
CAPSULE, DELAYED RELEASE ORAL
Qty: 30 CAPSULE | Refills: 0 | Status: SHIPPED | OUTPATIENT
Start: 2022-01-17 | End: 2022-04-04 | Stop reason: SDUPTHER

## 2022-01-21 ENCOUNTER — OFFICE VISIT (OUTPATIENT)
Dept: PSYCHIATRY | Age: 47
End: 2022-01-21
Payer: MEDICAID

## 2022-01-21 DIAGNOSIS — F90.0 ATTENTION DEFICIT HYPERACTIVITY DISORDER (ADHD), PREDOMINANTLY INATTENTIVE TYPE: Primary | ICD-10-CM

## 2022-01-21 DIAGNOSIS — F33.1 MODERATE EPISODE OF RECURRENT MAJOR DEPRESSIVE DISORDER (HCC): ICD-10-CM

## 2022-01-21 PROCEDURE — G8428 CUR MEDS NOT DOCUMENT: HCPCS | Performed by: PSYCHIATRY & NEUROLOGY

## 2022-01-21 PROCEDURE — 99214 OFFICE O/P EST MOD 30 MIN: CPT | Performed by: PSYCHIATRY & NEUROLOGY

## 2022-01-21 PROCEDURE — 90833 PSYTX W PT W E/M 30 MIN: CPT | Performed by: PSYCHIATRY & NEUROLOGY

## 2022-01-21 PROCEDURE — 1036F TOBACCO NON-USER: CPT | Performed by: PSYCHIATRY & NEUROLOGY

## 2022-01-21 PROCEDURE — G8417 CALC BMI ABV UP PARAM F/U: HCPCS | Performed by: PSYCHIATRY & NEUROLOGY

## 2022-01-21 PROCEDURE — G8484 FLU IMMUNIZE NO ADMIN: HCPCS | Performed by: PSYCHIATRY & NEUROLOGY

## 2022-01-21 RX ORDER — ATOMOXETINE 40 MG/1
40 CAPSULE ORAL DAILY
Qty: 14 CAPSULE | Refills: 0 | Status: SHIPPED | OUTPATIENT
Start: 2022-01-21 | End: 2022-02-24

## 2022-01-21 RX ORDER — DULOXETIN HYDROCHLORIDE 30 MG/1
30 CAPSULE, DELAYED RELEASE ORAL DAILY
Qty: 30 CAPSULE | Refills: 3 | Status: SHIPPED | OUTPATIENT
Start: 2022-01-21 | End: 2022-06-08 | Stop reason: SDUPTHER

## 2022-01-21 RX ORDER — ATOMOXETINE 25 MG/1
25 CAPSULE ORAL DAILY
Qty: 14 CAPSULE | Refills: 0 | Status: SHIPPED | OUTPATIENT
Start: 2022-01-21 | End: 2022-02-24

## 2022-01-21 NOTE — PATIENT INSTRUCTIONS
Week 1-2 : reduce prazosin to every other night then stop                    Reduce strattera to 40mg daily   Week 3-4: reduce strattera to 25mg daily                   Stop prazosin  Week 5: start taking extra duloxetine dose at 30mg daily

## 2022-02-03 DIAGNOSIS — E53.8 VITAMIN B12 DEFICIENCY: ICD-10-CM

## 2022-02-03 DIAGNOSIS — I10 ESSENTIAL HYPERTENSION: ICD-10-CM

## 2022-02-04 RX ORDER — VALSARTAN AND HYDROCHLOROTHIAZIDE 160; 25 MG/1; MG/1
TABLET ORAL
Qty: 30 TABLET | Refills: 5 | Status: SHIPPED | OUTPATIENT
Start: 2022-02-04 | End: 2022-03-25 | Stop reason: SDUPTHER

## 2022-02-04 RX ORDER — CYANOCOBALAMIN 1000 UG/ML
INJECTION INTRAMUSCULAR; SUBCUTANEOUS
Qty: 1 ML | Refills: 4 | Status: SHIPPED | OUTPATIENT
Start: 2022-02-04 | End: 2022-03-25 | Stop reason: SDUPTHER

## 2022-02-11 DIAGNOSIS — I10 ESSENTIAL HYPERTENSION: ICD-10-CM

## 2022-02-11 RX ORDER — AMLODIPINE BESYLATE 10 MG/1
TABLET ORAL
Qty: 90 TABLET | Refills: 0 | Status: SHIPPED | OUTPATIENT
Start: 2022-02-11 | End: 2022-03-25 | Stop reason: SDUPTHER

## 2022-02-24 NOTE — PROGRESS NOTES
Name_______________________________________Printed:____________________  Date and time of surgery___2/28/2022________1130_____________Arrival Time:___1000_____________   1. The instructions given regarding when and if a patient needs to stop oral intake prior to surgery varies. Follow the specific instructions you were given                  _x__Nothing to eat or to drink after Midnight the night before.                   ____Carbo loading or ERAS instructions will be given to select patients-if you have been given those instructions -please do the following                           The evening before your surgery after dinner before midnight drink 40 ounces of gatorade. If you are diabetic use sugar free. The morning of surgery drink 40 ounces of water. This needs to be finished 3 hours prior to your surgery start time. 2. Take the following pills with a small sip of water on the morning of surgery____cymbalta__and gabapentin_____________________________________________                  Do not take blood pressure medications ending in pril or sartan the manuel prior to surgery or the morning of surgery_   3. Aspirin, Ibuprofen, Advil, Naproxen, Vitamin E and other Anti-inflammatory products and supplements should be stopped for 5 -7days before surgery or as directed by your physician. 4. Check with your Doctor regarding stopping Plavix, Coumadin,Eliquis, Lovenox,Effient,Pradaxa,Xarelto, Fragmin or other blood thinners and follow their instructions. 5. Do not smoke, and do not drink any alcoholic beverages 24 hours prior to surgery. This includes NA Beer. Refrain from the usage of any recreational drugs. 6. You may brush your teeth and gargle the morning of surgery. DO NOT SWALLOW WATER   7. You MUST make arrangements for a responsible adult to stay on site while you are here and take you home after your surgery. You will not be allowed to leave alone or drive yourself home.   It is strongly suggested someone stay with you the first 24 hrs. Your surgery will be cancelled if you do not have a ride home. 8. A parent/legal guardian must accompany a child scheduled for surgery and plan to stay at the hospital until the child is discharged. Please do not bring other children with you. 9. Please wear simple, loose fitting clothing to the hospital.  Ramsay Shadow not bring valuables (money, credit cards, checkbooks, etc.) Do not wear any makeup (including no eye makeup) or nail polish on your fingers or toes. 10. DO NOT wear any jewelry or piercings on day of surgery. All body piercing jewelry must be removed. 11. If you have ___dentures, they will be removed before going to the OR; we will provide you a container. If you wear ___contact lenses or _x__glasses, they will be removed; please bring a case for them. 12. Please see your family doctor/pediatrician for a history & physical and/or concerning medications. Bring any test results/reports from your physician's office. PCP__________________Phone___________H&P Appt. Date________             13 If you  have a Living Will and Durable Power of  for Healthcare, please bring in a copy. 15. Notify your Surgeon if you develop any illness between now and surgery  time, cough, cold, fever, sore throat, nausea, vomiting, etc.  Please notify your surgeon if you experience dizziness, shortness of breath or blurred vision between now & the time of your surgery             15. DO NOT shave your operative site 96 hours prior to surgery. For face & neck surgery, men may use an electric razor 48 hours prior to surgery. 16. Shower the night before or morning of surgery using an antibacterial soap or as you have been instructed. 17. To provide excellent care visitors will be limited to one in the room at any given time. 18.  Please bring picture ID and insurance card.              19.  Visit our web site for additional information:  [email or web address]              20.During flu season no children under the age of 15 are permitted in the hospital for the safety of all patients. 21. If you take a long acting insulin in the evening only  take half of your usual  dose the night  before your procedure              22. If you use a c-pap please bring DOS if staying overnight,             23.For your convenience [facility] has a pharmacy on site to fill your prescriptions. 24. If you use oxygen and have a portable tank please bring it  with you the DOS             25. Bring a complete list of all your medications with name and dose include any supplements. 26. Other__________________________________________   *Please call pre admission testing if you any further questions   [facility]         [address]    [place] [phone]  [phone]   [facility] [address] IMELDA DAS   [phone]           COVID TESTING    __x_ Covid test to be done 3-5 days prior to scheduled surgery -patient aware they are REQUIRED to bring a copy of the negative result DOS-if they receive a positive result to notify their surgeon         If known - indicate where patient is getting covid test done ___________________________________________________________    ___ Rapid - DOS    ___ Other___________________________________      Carloz Valdivia POLICY(subject to change)    There is a one visitor policy at [facility] for all surgeries and endoscopies. Whether the visitor can stay or will be asked to wait in the car will depend on the current policy and if social distancing can be maintained. The policy is subject to change at any time. Please make sure the visitor has a cell phone that is on,charged and able to accept calls, as this may be the way that the staff communicates with them. Pain management is NO VISITOR policyThe patients ride is expected to remain in the car with a cell phone for communication. If the ride is leaving the hospital grounds please make sure they are back in time for pickup. Have the patient inform the staff on arrival what their rides plans are while the patient is in the facility. At the MAIN there is one visitor allowed. Please note that the visitor policy is subject to change. All above information reviewed with patient in person or by phone. Patient verbalizes understanding. All questions and concerns addressed.                                                                                                  Patient/Rep____patient________________                                                                                                                                    PRE OP INSTRUCTIONS

## 2022-02-27 ENCOUNTER — ANESTHESIA EVENT (OUTPATIENT)
Dept: OPERATING ROOM | Age: 47
End: 2022-02-27
Payer: OTHER GOVERNMENT

## 2022-02-28 ENCOUNTER — ANESTHESIA (OUTPATIENT)
Dept: OPERATING ROOM | Age: 47
End: 2022-02-28
Payer: OTHER GOVERNMENT

## 2022-02-28 ENCOUNTER — HOSPITAL ENCOUNTER (OUTPATIENT)
Age: 47
Setting detail: OUTPATIENT SURGERY
Discharge: HOME | End: 2022-02-28
Attending: PODIATRIST | Admitting: PODIATRIST
Payer: OTHER GOVERNMENT

## 2022-02-28 VITALS
DIASTOLIC BLOOD PRESSURE: 63 MMHG | HEIGHT: 62 IN | WEIGHT: 181.13 LBS | OXYGEN SATURATION: 97 % | RESPIRATION RATE: 15 BRPM | HEART RATE: 87 BPM | TEMPERATURE: 97.6 F | SYSTOLIC BLOOD PRESSURE: 100 MMHG | BODY MASS INDEX: 33.33 KG/M2

## 2022-02-28 VITALS
DIASTOLIC BLOOD PRESSURE: 57 MMHG | SYSTOLIC BLOOD PRESSURE: 100 MMHG | OXYGEN SATURATION: 100 % | RESPIRATION RATE: 1 BRPM

## 2022-02-28 DIAGNOSIS — G89.18 POST-OP PAIN: Primary | ICD-10-CM

## 2022-02-28 DIAGNOSIS — Z98.890 S/P BUNIONECTOMY: ICD-10-CM

## 2022-02-28 LAB — HCG(URINE) PREGNANCY TEST: NEGATIVE

## 2022-02-28 PROCEDURE — 7100000011 HC PHASE II RECOVERY - ADDTL 15 MIN: Performed by: PODIATRIST

## 2022-02-28 PROCEDURE — 3700000001 HC ADD 15 MINUTES (ANESTHESIA): Performed by: PODIATRIST

## 2022-02-28 PROCEDURE — 6360000002 HC RX W HCPCS: Performed by: PODIATRIST

## 2022-02-28 PROCEDURE — 3600000013 HC SURGERY LEVEL 3 ADDTL 15MIN: Performed by: PODIATRIST

## 2022-02-28 PROCEDURE — 3600000003 HC SURGERY LEVEL 3 BASE: Performed by: PODIATRIST

## 2022-02-28 PROCEDURE — 6360000002 HC RX W HCPCS: Performed by: ANESTHESIOLOGY

## 2022-02-28 PROCEDURE — 7100000001 HC PACU RECOVERY - ADDTL 15 MIN: Performed by: PODIATRIST

## 2022-02-28 PROCEDURE — 2500000003 HC RX 250 WO HCPCS: Performed by: ANESTHESIOLOGY

## 2022-02-28 PROCEDURE — 3700000000 HC ANESTHESIA ATTENDED CARE: Performed by: PODIATRIST

## 2022-02-28 PROCEDURE — 2580000003 HC RX 258: Performed by: PODIATRIST

## 2022-02-28 PROCEDURE — 7100000010 HC PHASE II RECOVERY - FIRST 15 MIN: Performed by: PODIATRIST

## 2022-02-28 PROCEDURE — 2709999900 HC NON-CHARGEABLE SUPPLY: Performed by: PODIATRIST

## 2022-02-28 PROCEDURE — 2500000003 HC RX 250 WO HCPCS: Performed by: PODIATRIST

## 2022-02-28 PROCEDURE — 7100000000 HC PACU RECOVERY - FIRST 15 MIN: Performed by: PODIATRIST

## 2022-02-28 PROCEDURE — 84703 CHORIONIC GONADOTROPIN ASSAY: CPT

## 2022-02-28 RX ORDER — HYDROCODONE BITARTRATE AND ACETAMINOPHEN 5; 325 MG/1; MG/1
1 TABLET ORAL EVERY 6 HOURS PRN
Qty: 20 TABLET | Refills: 0 | Status: SHIPPED | OUTPATIENT
Start: 2022-02-28 | End: 2022-03-05

## 2022-02-28 RX ORDER — HYDROMORPHONE HCL 110MG/55ML
0.25 PATIENT CONTROLLED ANALGESIA SYRINGE INTRAVENOUS EVERY 5 MIN PRN
Status: DISCONTINUED | OUTPATIENT
Start: 2022-02-28 | End: 2022-02-28 | Stop reason: HOSPADM

## 2022-02-28 RX ORDER — BUPIVACAINE HYDROCHLORIDE 5 MG/ML
INJECTION, SOLUTION EPIDURAL; INTRACAUDAL
Status: COMPLETED | OUTPATIENT
Start: 2022-02-28 | End: 2022-02-28

## 2022-02-28 RX ORDER — ONDANSETRON 2 MG/ML
4 INJECTION INTRAMUSCULAR; INTRAVENOUS
Status: DISCONTINUED | OUTPATIENT
Start: 2022-02-28 | End: 2022-02-28 | Stop reason: HOSPADM

## 2022-02-28 RX ORDER — PROPOFOL 10 MG/ML
INJECTION, EMULSION INTRAVENOUS CONTINUOUS PRN
Status: DISCONTINUED | OUTPATIENT
Start: 2022-02-28 | End: 2022-02-28 | Stop reason: SDUPTHER

## 2022-02-28 RX ORDER — MIDAZOLAM HYDROCHLORIDE 1 MG/ML
INJECTION INTRAMUSCULAR; INTRAVENOUS PRN
Status: DISCONTINUED | OUTPATIENT
Start: 2022-02-28 | End: 2022-02-28 | Stop reason: SDUPTHER

## 2022-02-28 RX ORDER — LIDOCAINE HYDROCHLORIDE 10 MG/ML
INJECTION, SOLUTION EPIDURAL; INFILTRATION; INTRACAUDAL; PERINEURAL
Status: COMPLETED | OUTPATIENT
Start: 2022-02-28 | End: 2022-02-28

## 2022-02-28 RX ORDER — DIPHENHYDRAMINE HYDROCHLORIDE 50 MG/ML
12.5 INJECTION INTRAMUSCULAR; INTRAVENOUS
Status: DISCONTINUED | OUTPATIENT
Start: 2022-02-28 | End: 2022-02-28 | Stop reason: HOSPADM

## 2022-02-28 RX ORDER — DEXAMETHASONE SODIUM PHOSPHATE 4 MG/ML
INJECTION, SOLUTION INTRA-ARTICULAR; INTRALESIONAL; INTRAMUSCULAR; INTRAVENOUS; SOFT TISSUE PRN
Status: DISCONTINUED | OUTPATIENT
Start: 2022-02-28 | End: 2022-02-28 | Stop reason: SDUPTHER

## 2022-02-28 RX ORDER — SODIUM CHLORIDE, SODIUM LACTATE, POTASSIUM CHLORIDE, CALCIUM CHLORIDE 600; 310; 30; 20 MG/100ML; MG/100ML; MG/100ML; MG/100ML
INJECTION, SOLUTION INTRAVENOUS CONTINUOUS
Status: DISCONTINUED | OUTPATIENT
Start: 2022-02-28 | End: 2022-02-28 | Stop reason: HOSPADM

## 2022-02-28 RX ORDER — ONDANSETRON 2 MG/ML
INJECTION INTRAMUSCULAR; INTRAVENOUS PRN
Status: DISCONTINUED | OUTPATIENT
Start: 2022-02-28 | End: 2022-02-28 | Stop reason: SDUPTHER

## 2022-02-28 RX ORDER — LIDOCAINE HYDROCHLORIDE 10 MG/ML
0.5 INJECTION, SOLUTION EPIDURAL; INFILTRATION; INTRACAUDAL; PERINEURAL ONCE
Status: DISCONTINUED | OUTPATIENT
Start: 2022-02-28 | End: 2022-02-28 | Stop reason: HOSPADM

## 2022-02-28 RX ORDER — HYDROMORPHONE HCL 110MG/55ML
0.5 PATIENT CONTROLLED ANALGESIA SYRINGE INTRAVENOUS EVERY 5 MIN PRN
Status: DISCONTINUED | OUTPATIENT
Start: 2022-02-28 | End: 2022-02-28 | Stop reason: HOSPADM

## 2022-02-28 RX ORDER — PROPOFOL 10 MG/ML
INJECTION, EMULSION INTRAVENOUS PRN
Status: DISCONTINUED | OUTPATIENT
Start: 2022-02-28 | End: 2022-02-28 | Stop reason: SDUPTHER

## 2022-02-28 RX ORDER — LIDOCAINE HYDROCHLORIDE 20 MG/ML
INJECTION, SOLUTION INFILTRATION; PERINEURAL PRN
Status: DISCONTINUED | OUTPATIENT
Start: 2022-02-28 | End: 2022-02-28 | Stop reason: SDUPTHER

## 2022-02-28 RX ORDER — SODIUM CHLORIDE 0.9 % (FLUSH) 0.9 %
5-40 SYRINGE (ML) INJECTION EVERY 12 HOURS SCHEDULED
Status: DISCONTINUED | OUTPATIENT
Start: 2022-02-28 | End: 2022-02-28 | Stop reason: HOSPADM

## 2022-02-28 RX ORDER — SODIUM CHLORIDE 9 MG/ML
25 INJECTION, SOLUTION INTRAVENOUS PRN
Status: DISCONTINUED | OUTPATIENT
Start: 2022-02-28 | End: 2022-02-28 | Stop reason: HOSPADM

## 2022-02-28 RX ORDER — MEPERIDINE HYDROCHLORIDE 25 MG/ML
12.5 INJECTION INTRAMUSCULAR; INTRAVENOUS; SUBCUTANEOUS EVERY 5 MIN PRN
Status: DISCONTINUED | OUTPATIENT
Start: 2022-02-28 | End: 2022-02-28 | Stop reason: HOSPADM

## 2022-02-28 RX ORDER — SODIUM CHLORIDE 0.9 % (FLUSH) 0.9 %
5-40 SYRINGE (ML) INJECTION PRN
Status: DISCONTINUED | OUTPATIENT
Start: 2022-02-28 | End: 2022-02-28 | Stop reason: HOSPADM

## 2022-02-28 RX ADMIN — CEFAZOLIN SODIUM 2000 MG: 10 INJECTION, POWDER, FOR SOLUTION INTRAVENOUS at 12:27

## 2022-02-28 RX ADMIN — ONDANSETRON 4 MG: 2 INJECTION INTRAMUSCULAR; INTRAVENOUS at 12:37

## 2022-02-28 RX ADMIN — DEXAMETHASONE SODIUM PHOSPHATE 4 MG: 4 INJECTION, SOLUTION INTRAMUSCULAR; INTRAVENOUS at 12:37

## 2022-02-28 RX ADMIN — MIDAZOLAM 2 MG: 1 INJECTION INTRAMUSCULAR; INTRAVENOUS at 12:28

## 2022-02-28 RX ADMIN — LIDOCAINE HYDROCHLORIDE 100 MG: 20 INJECTION, SOLUTION INFILTRATION; PERINEURAL at 12:34

## 2022-02-28 RX ADMIN — SODIUM CHLORIDE, POTASSIUM CHLORIDE, SODIUM LACTATE AND CALCIUM CHLORIDE: 600; 310; 30; 20 INJECTION, SOLUTION INTRAVENOUS at 11:08

## 2022-02-28 RX ADMIN — PROPOFOL 120 MCG/KG/MIN: 10 INJECTION, EMULSION INTRAVENOUS at 12:34

## 2022-02-28 RX ADMIN — PROPOFOL 80 MG: 10 INJECTION, EMULSION INTRAVENOUS at 12:34

## 2022-02-28 ASSESSMENT — PULMONARY FUNCTION TESTS
PIF_VALUE: 1
PIF_VALUE: 1
PIF_VALUE: 0
PIF_VALUE: 1
PIF_VALUE: 0

## 2022-02-28 ASSESSMENT — PAIN SCALES - GENERAL
PAINLEVEL_OUTOF10: 0

## 2022-02-28 ASSESSMENT — PAIN - FUNCTIONAL ASSESSMENT
PAIN_FUNCTIONAL_ASSESSMENT: PREVENTS OR INTERFERES SOME ACTIVE ACTIVITIES AND ADLS
PAIN_FUNCTIONAL_ASSESSMENT: 0-10

## 2022-02-28 ASSESSMENT — PAIN DESCRIPTION - DESCRIPTORS: DESCRIPTORS: BURNING;SHOOTING

## 2022-02-28 NOTE — BRIEF OP NOTE
Brief Postoperative Note      Patient: Steve Bledsoe  YOB: 1975  MRN: [id number]    Date of Procedure: 2/28/2022    Pre-Op Diagnosis: T84.84XA PAIN DUE TO INTERNAL ORTHOPEDIC PROSTHESIS DEVICE/GRAFT    Post-Op Diagnosis: Same       Procedure(s):  REMOVAL OF PAINFUL HARDWARE RIGHT FOOT-MONI    Surgeon(s):  Mortimer Milch, DPM    Assistant:  * No surgical staff found *    Anesthesia: Monitor Anesthesia Care    Estimated Blood Loss (mL): Minimal    Complications: None    Specimens:   * No specimens in log *    Implants:  * No implants in log *      Drains: * No LDAs found *    Findings: 1 screw removed. All other hardware is flush with bone and covered by soft tissue.     Electronically signed by Luna Douglass DPM on 2/28/2022 at 12:55 PM

## 2022-02-28 NOTE — H&P
[facility]   HISTORY AND PHYSICAL      I am seeing Manny Tejeda at the request of Dr Be Sutherland for evaluation of the patient's medical problems prior to removal of painful hardware R foot    2022 10:53 AM    Patient Information:  Manny Tejeda is a 40 y.o. female [id number]  PCP:  NIKOLE Gibson CNP (Tel: [phone] )    Chief complaint:  R foot pain      History of Present Illness:  Chris Saucedo is a 40 y.o. female who presented with R foot pain. Symptom onset was constant for a time period of several months but worse in the past month. The severity is described as severe. The course of her symptoms over time is worsening. The symptoms improved with nothing and worsened with wearing shoes, standing. The patient's symptom is associated with swelling and hardware almost protruding thru the top of her foot. She denies fevers, recent illness, cp, or sob. REVIEW OF SYSTEMS:   Constitutional:  Negative for fever,chills or night sweats  ENT:  Negative for rhinorrhea, epistaxis, hoarseness, sore throat. Respiratory:   Negative for shortness of breath,wheezing  Cardiovascular:   Negative for  chest pain, palpitations   Gastrointestinal:  Negative for nausea, vomiting, diarrhea  Genitourinary:  Negative for polyuria, dysuria   Hematologic/Lymphatic:  Negative for  bleeding tendency, easy bruising  Neurologic:  Negative for  confusion,dysarthria. Skin:  Negative for itching,rash  Psychiatric:  Negative for depression,anxiety, agitation. Endocrine:  Negative for polydipsia,polyuria,heat /cold intolerance. Past Medical History:   has a past medical history of Anxiety, Depression, Hypertension, and Insomnia. Past Surgical History:   has a past surgical history that includes  section; back surgery; and Bunionectomy (Right, 2019).      Medications:  No current facility-administered medications on file prior to encounter. Current Outpatient Medications on File Prior to Encounter   Medication Sig Dispense Refill    amLODIPine (NORVASC) 10 MG tablet TAKE ONE TABLET BY MOUTH DAILY 90 tablet 0    cyanocobalamin 1000 MCG/ML injection INJECT ONE MILLILITER INTRAMUSCULARLY EVERY 30 DAYS 1 mL 4    valsartan-hydroCHLOROthiazide (DIOVAN-HCT) 160-25 MG per tablet TAKE ONE TABLET BY MOUTH DAILY 30 tablet 5    DULoxetine (CYMBALTA) 30 MG extended release capsule Take 1 capsule by mouth daily Take with 60mg dose for a total of 90mg daily 30 capsule 3    DULoxetine (CYMBALTA) 60 MG extended release capsule TAKE ONE CAPSULE BY MOUTH DAILY 30 capsule 0    traZODone (DESYREL) 50 MG tablet Take 1-1.5 tablets by mouth nightly as needed for Sleep 45 tablet 5    ondansetron (ZOFRAN) 4 MG tablet Take 1 tablet by mouth daily as needed for Nausea or Vomiting 10 tablet 0    SYRINGE-NEEDLE, DISP, 3 ML 25G X 1\" 3 ML MISC 1 applicator by Does not apply route every 30 days 12 each 0    Insulin Pen Needle 31G X 5 MM MISC 1 each by Does not apply route daily 100 each 3       Allergies:  No Known Allergies     Social History:   reports that she has quit smoking. Her smoking use included cigarettes. She quit after 1.00 year of use. She has never used smokeless tobacco. She reports current alcohol use of about 6.0 standard drinks of alcohol per week. She reports that she does not use drugs. Family History:  family history includes High Blood Pressure in her mother. Physical Exam:  /73   Pulse 75   Temp 97.8 °F (36.6 °C) (Temporal)   Resp 16   Ht 5' 2\" (1.575 m)   Wt 181 lb 2 oz (82.2 kg)   SpO2 97%   BMI 33.13 kg/m²     General appearance:  Appears comfortable. Well nourished  Eyes: Sclera clear, pupils equal  ENT: Moist mucus membranes, no thrush. Trachea midline. Cardiovascular: Regular rhythm, normal S1, S2. No murmur, gallop, rub.  No edema in lower extremities  Respiratory: Clear to auscultation bilaterally, no wheeze, good inspiratory effort  Gastrointestinal: Abdomen soft, non-tender, not distended, normal bowel sounds  Musculoskeletal: No cyanosis in digits, neck supple, tenting of hardware on dorsum of R foot  Neurology: Cranial nerves grossly intact. Alert and oriented in time, place and person. No speech or motor deficits  Psychiatry: Appropriate affect. Not agitated  Skin: Warm, dry, normal turgor, no rash    Labs:  CBC:   Lab Results   Component Value Date    WBC 4.7 01/11/2021    RBC 4.45 01/11/2021    HGB 13.7 01/11/2021    HCT 41.4 01/11/2021    MCV 93.1 01/11/2021    MCH 30.8 01/11/2021    MCHC 33.1 01/11/2021    RDW 12.4 01/11/2021     01/11/2021    MPV 10.5 01/11/2021     BMP:    Lab Results   Component Value Date     01/11/2021    K 4.5 01/11/2021    CL 99 01/11/2021    CO2 27 01/11/2021    BUN 24 01/11/2021    CREATININE 0.7 01/11/2021    CALCIUM 9.5 01/11/2021    GFRAA >60 01/11/2021    LABGLOM >60 01/11/2021    GLUCOSE 113 01/11/2021         Problem List  Active Problems:    * No active hospital problems. *  Resolved Problems:    * No resolved hospital problems. *        Assessment & Recommendation:       1. Painful hardware R foot-Patient is medically optimized for surgery. 2. Hypertension-BP stable. Resume BP meds post op. Thank you for the opportunity to participate in the care of your patient.   Spike Harper PA-C    2/28/2022 10:53 AM

## 2022-02-28 NOTE — PROGRESS NOTES
Pt arrived from OR to PACU bay 3. Reported received from [facility] and ANAMIKA cowan. Pt  arousable to voice. Surgical incisions dressings in place to right foot. Pt on RA, NSR, and VSS. Will continue to monitor.

## 2022-02-28 NOTE — PROGRESS NOTES
Discharge instructions review with patient and boyfriend. All home medications have been reviewed, pt v/u. Discharge instructions reviewed. Pt discharged via wheelchair. Pt discharged with surgical shoe and 1 prescription all belongings. boyfriend taking stable pt home.

## 2022-02-28 NOTE — PROGRESS NOTES
Pt awake and alert. Pt on RA, VSS. Pt denies pain and nausea, tolerating PO. Skin warm  and able to wiggle toes to right foot. Pt meets criteria to be discharged from Phase 1. Will continue to monitor.

## 2022-02-28 NOTE — ANESTHESIA POSTPROCEDURE EVALUATION
Department of Anesthesiology  Postprocedure Note    Patient: River Vera  MRN: [id number]  YOB: 1975  Date of evaluation: 2/28/2022  Time:  1:09 PM     Procedure Summary     Date: 02/28/22 Room / Location: [address]    Anesthesia Start: 2980 Anesthesia Stop: 0942    Procedure: REMOVAL OF PAINFUL HARDWARE RIGHT FOOT-MONI (Right Foot) Diagnosis: (T84.84XA PAIN DUE TO INTERNAL ORTHOPEDIC PROSTHESIS DEVICE/GRAFT)    Surgeons: Nura Tello DPM Responsible Provider: Rafi Bowles MD    Anesthesia Type: MAC ASA Status: 2          Anesthesia Type: MAC    Kelsey Phase I: Kelsey Score: 10    Kelsey Phase II:      Last vitals: Reviewed and per EMR flowsheets.        Anesthesia Post Evaluation    Patient location during evaluation: PACU  Patient participation: complete - patient participated  Level of consciousness: awake and awake and alert  Pain score: 2  Airway patency: patent  Nausea & Vomiting: no vomiting  Complications: no  Cardiovascular status: blood pressure returned to baseline  Respiratory status: acceptable  Hydration status: euvolemic  Multimodal analgesia pain management approach

## 2022-02-28 NOTE — ANESTHESIA PRE PROCEDURE
Pre-Anesthesia Evaluation/Consultation       Name:  Bobbi Honeycutt  : 1975  Age:  40 y.o. MRN:  [id number]  Date: 2022           Surgeon: Surgeon(s):  Ava Nguyen DPM    Procedure: Procedure(s):  LAPIDUS BUNIONECTOMY RIGHT FOOT WITH Pack Areola OSTEOTOMY - MONI     No Known Allergies  Patient Active Problem List   Diagnosis    Essential hypertension    Multiple trauma    Closed fracture of left distal radius    Injury of triangular fibrocartilage complex of left wrist    Left wrist tendonitis    Recurrent major depressive disorder, in partial remission ([facility] [address]    Anxiety    Class 1 obesity due to excess calories with serious comorbidity and body mass index (BMI) of 33.0 to 33.9 in adult    S/P bunionectomy     Past Medical History:   Diagnosis Date    Anxiety     Depression     Hypertension     Insomnia      Past Surgical History:   Procedure Laterality Date    BACK SURGERY      RODS AND SCREWS IN PLACE    BUNIONECTOMY Right 2019    LAPIDUS BUNIONECTOMY RIGHT FOOT WITH Pack Areola OSTEOTOMY - MONI performed by Ava Nguyen DPM at [facility]       Social History     Tobacco Use    Smoking status: Former Smoker     Years: 1.00     Types: Cigarettes    Smokeless tobacco: Never Used   Vaping Use    Vaping Use: Never used   Substance Use Topics    Alcohol use:  Yes     Alcohol/week: 6.0 standard drinks     Types: 6 Glasses of wine per week    Drug use: No     Medications  Current Outpatient Medications on File Prior to Visit   Medication Sig Dispense Refill    amLODIPine (NORVASC) 10 MG tablet TAKE ONE TABLET BY MOUTH DAILY 90 tablet 0    cyanocobalamin 1000 MCG/ML injection INJECT ONE MILLILITER INTRAMUSCULARLY EVERY 30 DAYS 1 mL 4    valsartan-hydroCHLOROthiazide (DIOVAN-HCT) 160-25 MG per tablet TAKE ONE TABLET BY MOUTH DAILY 30 tablet 5    DULoxetine (CYMBALTA) 30 MG extended release capsule Take 1 tolerance: good (>4 METS),   (+) hypertension:,         Rhythm: regular  Rate: normal           Beta Blocker:  Not on Beta Blocker         Neuro/Psych:   (+) psychiatric history: stable with treatmentdepression/anxiety             GI/Hepatic/Renal: Neg GI/Hepatic/Renal ROS            Endo/Other: Negative Endo/Other ROS                    Abdominal:   (+) obese,           Vascular: negative vascular ROS. Other Findings:               Anesthesia Plan      MAC     ASA 2     (R/B/A of procedure and anesthesia described. Patient/family understand and would like to proceed. Questions and concerns addressed.  )  Induction: intravenous. MIPS: Postoperative opioids intended and Prophylactic antiemetics administered. Anesthetic plan and risks discussed with patient. Plan discussed with CRNA.                   This pre-anesthesia assessment may be used as a history and physical.      Campbell Cary MD  February 28, 2022  9:53 AM

## 2022-02-28 NOTE — OP NOTE
Operative Note      Patient: Kirk Swann  YOB: 1975  MRN: [id number]    Date of Procedure: 2/28/2022    Pre-Op Diagnosis: T84.84XA PAIN DUE TO INTERNAL ORTHOPEDIC PROSTHESIS DEVICE/GRAFT    Post-Op Diagnosis: Same       Procedure(s):  REMOVAL OF PAINFUL HARDWARE RIGHT FOOT-JOYCE    Surgeon(s):  Usha Bartholomew DPM    Assistant:  Resident: Mady Miller D.P.M. Anesthesia: [facility]    Estimated Blood Loss (mL): Minimal    Injectables:  Preoperatively: 20 cc 1% lidocaine plain  Procedure: 10 cc 0.5% Marcaine plain    Materials: 3-0 Vicryl, 4-0 Vicryl, 5-0 Vicryl    Stasis: Pneumatic ankle tourniquet at 250 mmHg for 16 minutes    Complications: None    Specimens:   * No specimens in log *    Implants:  Implant Name Type Inv. Item Serial No.  Lot No. LRB No. Used Action   one screw removed from right foot size 2.7 x 25 joyce     JOYCE BHUMI-WD  Right 1 Explanted         Drains: * No LDAs found *    Findings: 1 screw removed. All other hardware is flush with bone and covered by soft tissue. INDICATIONS FOR PROCEDURE: This patient has signs and symptoms clinically consistent with the above mentioned preoperative diagnosis. Having failed conservative treatment, it was determined that the patient would benefit from surgical intervention. All potential risks, benefits, and complications were discussed with the patient prior to the scheduling of surgery. All of the patient's questions were answered and no guarantees were given. The patient wished to proceed with surgery, and informed written consent was obtained. DETAILS OF PROCEDURE: The patient was brought from the pre-operative area and placed on the operating table in the supine position. A pneumatic ankle tourniquet was placed around the patient's well-padded right lower extremity.  Following IV sedation, a local anesthetic block was then injected proximal to the incision site consisting of 20cc of 1% lidocaine plain. The right  lower extremity was then scrubbed, prepped, and draped in the usual sterile fashion. A time-out was performed. The patient, procedure, and operative site were confirmed. An Esmarch bandage was then utilized to exsanguinate the patient's right lower extremity. The tourniquet was then inflated to 250 mmHg and the following procedure was performed. Details of Procedure: REMOVAL OF PAINFUL HARDWARE RIGHT FOOT-MONI  Attention was then directed to the right foot where a prominent screw head and noted and marked with a skin marker. Following this, a well-placed stab incision was made over the head of the screw. Curved hemostats were employed to bluntly dissect down to the hardware. All neurovascular structures were carefully retracted; all bleeders were electrocauterized as needed. Using the screw  from the Moni set, the screw was engaged and slowly backed out of the dorsal 1st tarsometatarsal plate. Once freed, the screw was passed to the back table. The surgical wound was then irrigated with copious amounts of normal sterile saline from the back table. The remaining hardware was inspected and noted to be flush with the bone and well covered by soft tissue. It was determined at this time not to remove the remaining hardware. The deep tissues were re-approximated using 3-0 vicryl. The subcutaneous tissues were re-approximated using 4-0 vicryl and the skin was re-approximated using 5-0 vicryl in a running subcuticular fashion. At this time, a local anesthetic was injected about the incision sites consisting of 10cc of 0.5% Marcaine plain, for the patient's postoperative comfort. A soft sterile dressing was applied consisting of a Mepilex border and Ace bandage. The pneumatic ankle tourniquet was rapidly deflated after a total time of 16 minutes and a prompt hyperemic response was noted on all aspects of the patient's right lower extremity.     END OF PROCEDURE: The patient tolerated the procedure and anesthesia well and was transported from the operating room to the PACU with vital signs stable and vascular status intact to all aspects of the patient's right lower extremity and digital capillary refill time immediate to the digits of the right  foot. Following a period of post-operative monitoring, the patient will be discharged home with written and oral wound care and follow-up instructions per Dr. Bismark Driver. The patient is to follow-up with Dr. Bismark Driver in his private office. The patient is to keep dressing clean, dry and intact at all times. The patient is to call with questions or if any complications occur.     Dictated on behalf of Dr. Kar Lujan DPM.    Electronically signed by Elis Quezada DPM on 2/28/2022 at 1:02 PM

## 2022-03-25 ENCOUNTER — OFFICE VISIT (OUTPATIENT)
Dept: INTERNAL MEDICINE CLINIC | Age: 47
End: 2022-03-25
Payer: OTHER GOVERNMENT

## 2022-03-25 VITALS
WEIGHT: 181 LBS | SYSTOLIC BLOOD PRESSURE: 132 MMHG | BODY MASS INDEX: 33.11 KG/M2 | HEART RATE: 82 BPM | OXYGEN SATURATION: 98 % | DIASTOLIC BLOOD PRESSURE: 84 MMHG

## 2022-03-25 DIAGNOSIS — E53.8 VITAMIN B12 DEFICIENCY: ICD-10-CM

## 2022-03-25 DIAGNOSIS — F41.9 ANXIETY: ICD-10-CM

## 2022-03-25 DIAGNOSIS — E66.09 CLASS 1 OBESITY DUE TO EXCESS CALORIES WITH SERIOUS COMORBIDITY AND BODY MASS INDEX (BMI) OF 33.0 TO 33.9 IN ADULT: ICD-10-CM

## 2022-03-25 DIAGNOSIS — R06.83 SNORES: ICD-10-CM

## 2022-03-25 DIAGNOSIS — Z00.00 ANNUAL PHYSICAL EXAM: ICD-10-CM

## 2022-03-25 DIAGNOSIS — I10 ESSENTIAL HYPERTENSION: ICD-10-CM

## 2022-03-25 DIAGNOSIS — F33.41 RECURRENT MAJOR DEPRESSIVE DISORDER, IN PARTIAL REMISSION (HCC): ICD-10-CM

## 2022-03-25 DIAGNOSIS — Z00.00 ANNUAL PHYSICAL EXAM: Primary | ICD-10-CM

## 2022-03-25 LAB
A/G RATIO: 2.5 (ref 1.1–2.2)
ALBUMIN SERPL-MCNC: 5.2 G/DL (ref 3.4–5)
ALP BLD-CCNC: 76 U/L (ref 40–129)
ALT SERPL-CCNC: 55 U/L (ref 10–40)
ANION GAP SERPL CALCULATED.3IONS-SCNC: 20 MMOL/L (ref 3–16)
AST SERPL-CCNC: 45 U/L (ref 15–37)
BILIRUB SERPL-MCNC: 0.5 MG/DL (ref 0–1)
BUN BLDV-MCNC: 13 MG/DL (ref 7–20)
CALCIUM SERPL-MCNC: 9.8 MG/DL (ref 8.3–10.6)
CHLORIDE BLD-SCNC: 100 MMOL/L (ref 99–110)
CHOLESTEROL, TOTAL: 228 MG/DL (ref 0–199)
CO2: 23 MMOL/L (ref 21–32)
CREAT SERPL-MCNC: 0.8 MG/DL (ref 0.6–1.1)
GFR AFRICAN AMERICAN: >60
GFR NON-AFRICAN AMERICAN: >60
GLUCOSE BLD-MCNC: 102 MG/DL (ref 70–99)
HCT VFR BLD CALC: 43.2 % (ref 36–48)
HDLC SERPL-MCNC: 63 MG/DL (ref 40–60)
HEMOGLOBIN: 14.6 G/DL (ref 12–16)
LDL CHOLESTEROL CALCULATED: 148 MG/DL
MCH RBC QN AUTO: 31.4 PG (ref 26–34)
MCHC RBC AUTO-ENTMCNC: 33.7 G/DL (ref 31–36)
MCV RBC AUTO: 93 FL (ref 80–100)
PDW BLD-RTO: 13.6 % (ref 12.4–15.4)
PLATELET # BLD: 268 K/UL (ref 135–450)
PMV BLD AUTO: 9.8 FL (ref 5–10.5)
POTASSIUM SERPL-SCNC: 3.9 MMOL/L (ref 3.5–5.1)
RBC # BLD: 4.64 M/UL (ref 4–5.2)
SODIUM BLD-SCNC: 143 MMOL/L (ref 136–145)
T4 FREE: 1.1 NG/DL (ref 0.9–1.8)
TOTAL PROTEIN: 7.3 G/DL (ref 6.4–8.2)
TRIGL SERPL-MCNC: 83 MG/DL (ref 0–150)
TSH SERPL DL<=0.05 MIU/L-ACNC: 1.24 UIU/ML (ref 0.27–4.2)
VLDLC SERPL CALC-MCNC: 17 MG/DL
WBC # BLD: 5.6 K/UL (ref 4–11)

## 2022-03-25 PROCEDURE — 99396 PREV VISIT EST AGE 40-64: CPT | Performed by: NURSE PRACTITIONER

## 2022-03-25 PROCEDURE — G8484 FLU IMMUNIZE NO ADMIN: HCPCS | Performed by: NURSE PRACTITIONER

## 2022-03-25 RX ORDER — TRAZODONE HYDROCHLORIDE 50 MG/1
TABLET ORAL
Qty: 45 TABLET | Refills: 5 | Status: SHIPPED | OUTPATIENT
Start: 2022-03-25 | End: 2022-08-19 | Stop reason: SDUPTHER

## 2022-03-25 RX ORDER — TRAMADOL HYDROCHLORIDE 50 MG/1
TABLET ORAL
COMMUNITY
Start: 2022-03-10

## 2022-03-25 RX ORDER — AMLODIPINE BESYLATE 10 MG/1
TABLET ORAL
Qty: 90 TABLET | Refills: 3 | Status: SHIPPED | OUTPATIENT
Start: 2022-03-25

## 2022-03-25 RX ORDER — CYANOCOBALAMIN 1000 UG/ML
INJECTION INTRAMUSCULAR; SUBCUTANEOUS
Qty: 3 ML | Refills: 3 | Status: SHIPPED | OUTPATIENT
Start: 2022-03-25

## 2022-03-25 RX ORDER — GABAPENTIN 600 MG/1
TABLET ORAL
COMMUNITY
Start: 2022-02-18

## 2022-03-25 RX ORDER — DIAZEPAM 2 MG/1
TABLET ORAL
COMMUNITY
Start: 2021-10-27 | End: 2022-08-19 | Stop reason: ALTCHOICE

## 2022-03-25 RX ORDER — VALSARTAN AND HYDROCHLOROTHIAZIDE 160; 25 MG/1; MG/1
TABLET ORAL
Qty: 90 TABLET | Refills: 3 | Status: SHIPPED | OUTPATIENT
Start: 2022-03-25

## 2022-03-25 SDOH — ECONOMIC STABILITY: FOOD INSECURITY: WITHIN THE PAST 12 MONTHS, THE FOOD YOU BOUGHT JUST DIDN'T LAST AND YOU DIDN'T HAVE MONEY TO GET MORE.: NEVER TRUE

## 2022-03-25 SDOH — ECONOMIC STABILITY: FOOD INSECURITY: WITHIN THE PAST 12 MONTHS, YOU WORRIED THAT YOUR FOOD WOULD RUN OUT BEFORE YOU GOT MONEY TO BUY MORE.: NEVER TRUE

## 2022-03-25 ASSESSMENT — PATIENT HEALTH QUESTIONNAIRE - PHQ9
SUM OF ALL RESPONSES TO PHQ QUESTIONS 1-9: 3
2. FEELING DOWN, DEPRESSED OR HOPELESS: 1
3. TROUBLE FALLING OR STAYING ASLEEP: 0
5. POOR APPETITE OR OVEREATING: 0
SUM OF ALL RESPONSES TO PHQ QUESTIONS 1-9: 3
7. TROUBLE CONCENTRATING ON THINGS, SUCH AS READING THE NEWSPAPER OR WATCHING TELEVISION: 0
SUM OF ALL RESPONSES TO PHQ QUESTIONS 1-9: 3
9. THOUGHTS THAT YOU WOULD BE BETTER OFF DEAD, OR OF HURTING YOURSELF: 0
6. FEELING BAD ABOUT YOURSELF - OR THAT YOU ARE A FAILURE OR HAVE LET YOURSELF OR YOUR FAMILY DOWN: 1
SUM OF ALL RESPONSES TO PHQ QUESTIONS 1-9: 3
4. FEELING TIRED OR HAVING LITTLE ENERGY: 0
1. LITTLE INTEREST OR PLEASURE IN DOING THINGS: 1
8. MOVING OR SPEAKING SO SLOWLY THAT OTHER PEOPLE COULD HAVE NOTICED. OR THE OPPOSITE, BEING SO FIGETY OR RESTLESS THAT YOU HAVE BEEN MOVING AROUND A LOT MORE THAN USUAL: 0
SUM OF ALL RESPONSES TO PHQ9 QUESTIONS 1 & 2: 2

## 2022-03-25 ASSESSMENT — ENCOUNTER SYMPTOMS
CHEST TIGHTNESS: 0
COUGH: 0
DIARRHEA: 0
BLOOD IN STOOL: 0
APNEA: 0
CONSTIPATION: 0
ABDOMINAL PAIN: 0
SHORTNESS OF BREATH: 0
ABDOMINAL DISTENTION: 0
RHINORRHEA: 0
VOMITING: 0
BACK PAIN: 0
EYE PAIN: 0
NAUSEA: 0
WHEEZING: 0
EYE REDNESS: 0
SINUS PRESSURE: 0

## 2022-03-25 ASSESSMENT — SOCIAL DETERMINANTS OF HEALTH (SDOH): HOW HARD IS IT FOR YOU TO PAY FOR THE VERY BASICS LIKE FOOD, HOUSING, MEDICAL CARE, AND HEATING?: NOT HARD AT ALL

## 2022-03-25 NOTE — PROGRESS NOTES
3/25/22     Chief Complaint   Patient presents with    Annual Exam     fasting today - f/u on BP and weight loss     HPI    In for an annual exam. Overall doing well. S/p loose hardware removal in right foot Dr Bismark Driver. S/p carpal tunnel repair left wrist with Dr Alexey Short. Feeling much better, reports both right food and left wrist symptoms resolved. Feels that her physical symptoms are hindering her mental health. Seeing Dr Florence Dockery and has been discussing adding Wellbutrin. Is going to make an appointment with Dr Florence Dockery while in the office today, had surgery and had to cancel last appointment. Is emotional about her weight today. States this is getting to her more and more. Liraglutide has been prescribed in the past which wasn't started due to insurance coverage. Reports eating well-balanced low carb diet without weight loss. Chronic fatigue is persistent,  reports she does snore, not sure how significant. Not sure if having periods of apnea.      No Known Allergies    Current Outpatient Medications   Medication Sig Dispense Refill    diazePAM (VALIUM) 2 MG tablet       valsartan-hydroCHLOROthiazide (DIOVAN-HCT) 160-25 MG per tablet TAKE ONE TABLET BY MOUTH DAILY 90 tablet 3    amLODIPine (NORVASC) 10 MG tablet TAKE ONE TABLET BY MOUTH DAILY 90 tablet 3    cyanocobalamin 1000 MCG/ML injection INJECT ONE MILLILITER INTRAMUSCULARLY EVERY 30 DAYS 3 mL 3    Semaglutide 3 MG TABS Take 1 tablet by mouth daily 30 tablet 2    DULoxetine (CYMBALTA) 30 MG extended release capsule Take 1 capsule by mouth daily Take with 60mg dose for a total of 90mg daily 30 capsule 3    DULoxetine (CYMBALTA) 60 MG extended release capsule TAKE ONE CAPSULE BY MOUTH DAILY 30 capsule 0    traZODone (DESYREL) 50 MG tablet Take 1-1.5 tablets by mouth nightly as needed for Sleep 45 tablet 5    SYRINGE-NEEDLE, DISP, 3 ML 25G X 1\" 3 ML MISC 1 applicator by Does not apply route every 30 days 12 each 0    Insulin Pen Needle 31G X 5 MM MISC 1 each by Does not apply route daily 100 each 3    traMADol (ULTRAM) 50 MG tablet       gabapentin (NEURONTIN) 600 MG tablet        No current facility-administered medications for this visit. Review of Systems   Constitutional: Positive for fatigue (progressive). Negative for appetite change, chills and fever. HENT: Negative for congestion, ear pain, rhinorrhea and sinus pressure. Eyes: Negative for pain, redness and visual disturbance. Respiratory: Negative for apnea, cough, chest tightness, shortness of breath and wheezing. Cardiovascular: Negative for chest pain, palpitations and leg swelling. Gastrointestinal: Negative for abdominal distention, abdominal pain, blood in stool, constipation, diarrhea, nausea and vomiting. Endocrine: Negative for cold intolerance, heat intolerance, polydipsia, polyphagia and polyuria. Genitourinary: Negative for difficulty urinating, dysuria, enuresis, frequency, hematuria and urgency. Musculoskeletal: Positive for arthralgias (back, chronic). Negative for back pain, gait problem, joint swelling and neck pain. Skin: Negative for rash and wound. Allergic/Immunologic: Negative for environmental allergies, food allergies and immunocompromised state. Neurological: Negative for dizziness, syncope, weakness, light-headedness, numbness and headaches. Hematological: Negative for adenopathy. Does not bruise/bleed easily. Psychiatric/Behavioral: Positive for dysphoric mood. Negative for agitation, behavioral problems, confusion and suicidal ideas. The patient is not nervous/anxious. Vitals:    03/25/22 1147   BP: 132/84   Pulse: 82   SpO2: 98%   Weight: 181 lb (82.1 kg)      Physical Exam  Vitals reviewed. Constitutional:       General: She is not in acute distress. Appearance: She is well-developed. She is not ill-appearing or diaphoretic. HENT:      Head: Normocephalic and atraumatic.    Cardiovascular: controlled as it has been. Weight makes her mood worse. She sees psychiatry, she will schedule an appointment with Dr Curtis Beckford. BMI 33.0-33.9,adult/ Class 1 obesity due to excess calories with serious comorbidity and body mass index (BMI) of 33.0 to 33.9 in adult/ Snores  Uncontrolled despite healthy diet and exercise as tolerated. Exercise is limited by chronic pain secondary to history. Discussed benefits of weight management in regards to possible THALIA, HTN, HLD, mood and her chronic conditions. Referral for sleep medicine as she could benefit from sleep study.    - Semaglutide 3 MG TABS; Take 1 tablet by mouth daily  Dispense: 30 tablet; Refill: 2  - Kimberley Castaneda MD, Sleep Medicine, [facility]    Discussed medications with patient, who voiced understanding of their use and indications. All questions answered.     FU in 3 months for weight and prn     Electronically signed by NIKOLE Degroot CNP on 3/25/2022 at 12:56 PM

## 2022-03-26 LAB
ESTIMATED AVERAGE GLUCOSE: 102.5 MG/DL
HBA1C MFR BLD: 5.2 %

## 2022-03-28 DIAGNOSIS — R79.89 ELEVATED LFTS: Primary | ICD-10-CM

## 2022-04-04 ENCOUNTER — TELEPHONE (OUTPATIENT)
Dept: INTERNAL MEDICINE CLINIC | Age: 47
End: 2022-04-04

## 2022-04-04 RX ORDER — DULOXETIN HYDROCHLORIDE 60 MG/1
CAPSULE, DELAYED RELEASE ORAL
Qty: 30 CAPSULE | Refills: 1 | Status: SHIPPED | OUTPATIENT
Start: 2022-04-04 | End: 2022-06-02

## 2022-04-04 NOTE — TELEPHONE ENCOUNTER
Patient calling requesting refill of DULoxetine (CYMBALTA) 60 MG extended release capsule. Last written 01/17/22  Last OV 01/21/22  Next OV 04/27/22  Last recommended OV 03/04/22     Please send to [address] [facility] in [place].

## 2022-04-12 ENCOUNTER — TELEPHONE (OUTPATIENT)
Dept: PSYCHIATRY | Age: 47
End: 2022-04-12

## 2022-04-13 ENCOUNTER — TELEPHONE (OUTPATIENT)
Dept: INTERNAL MEDICINE CLINIC | Age: 47
End: 2022-04-13

## 2022-04-13 NOTE — TELEPHONE ENCOUNTER
Patient returning call to [facility].   She said that it is okay to change appointment 4/27/22 to 12 pm.

## 2022-04-14 NOTE — TELEPHONE ENCOUNTER
PA COVER MY MEDS  Medication:Rybelsus 3MG tablets  Key:[id number]  Status:PENDING      Message from Plan  Your PA request has been denied. Additional information will be provided in the denial communication.

## 2022-04-27 DIAGNOSIS — E53.8 VITAMIN B12 DEFICIENCY: ICD-10-CM

## 2022-04-27 DIAGNOSIS — R79.89 ELEVATED LFTS: ICD-10-CM

## 2022-04-27 LAB
ALBUMIN SERPL-MCNC: 4.4 G/DL (ref 3.4–5)
ALP BLD-CCNC: 75 U/L (ref 40–129)
ALT SERPL-CCNC: 24 U/L (ref 10–40)
AST SERPL-CCNC: 18 U/L (ref 15–37)
BILIRUB SERPL-MCNC: 0.3 MG/DL (ref 0–1)
BILIRUBIN DIRECT: <0.2 MG/DL (ref 0–0.3)
BILIRUBIN, INDIRECT: NORMAL MG/DL (ref 0–1)
FERRITIN: 166.2 NG/ML (ref 15–150)
HAV IGM SER IA-ACNC: NORMAL
HEPATITIS B CORE IGM ANTIBODY: NORMAL
HEPATITIS B SURFACE ANTIGEN INTERPRETATION: NORMAL
HEPATITIS C ANTIBODY INTERPRETATION: NORMAL
IRON SATURATION: 5 % (ref 15–50)
IRON: 18 UG/DL (ref 37–145)
TOTAL IRON BINDING CAPACITY: 351 UG/DL (ref 260–445)
TOTAL PROTEIN: 6.6 G/DL (ref 6.4–8.2)
VITAMIN B-12: 408 PG/ML (ref 211–911)

## 2022-05-19 ENCOUNTER — TELEPHONE (OUTPATIENT)
Dept: INTERNAL MEDICINE CLINIC | Age: 47
End: 2022-05-19

## 2022-05-19 DIAGNOSIS — E66.09 CLASS 1 OBESITY DUE TO EXCESS CALORIES WITH SERIOUS COMORBIDITY AND BODY MASS INDEX (BMI) OF 33.0 TO 33.9 IN ADULT: Primary | ICD-10-CM

## 2022-05-19 NOTE — TELEPHONE ENCOUNTER
Recommend scheduling with sleep medicine for sleep study as this will help. I also recommend seeing weight management if pt is willing.   [employer]

## 2022-05-19 NOTE — TELEPHONE ENCOUNTER
Pt was calling in regards to a Prior Authorization for Semaglutide 3 MG TABS Pt called back in April but still hasn't been able to get the medication. If any questions please call Pt.

## 2022-05-19 NOTE — TELEPHONE ENCOUNTER
[facility]   [address] [place] [address] [address] [place]    Call to schedule appt

## 2022-06-02 RX ORDER — DULOXETIN HYDROCHLORIDE 60 MG/1
CAPSULE, DELAYED RELEASE ORAL
Qty: 30 CAPSULE | Refills: 1 | Status: SHIPPED | OUTPATIENT
Start: 2022-06-02 | End: 2022-08-19 | Stop reason: SDUPTHER

## 2022-06-08 ENCOUNTER — TELEPHONE (OUTPATIENT)
Dept: INTERNAL MEDICINE CLINIC | Age: 47
End: 2022-06-08

## 2022-06-08 DIAGNOSIS — F33.1 MODERATE EPISODE OF RECURRENT MAJOR DEPRESSIVE DISORDER (HCC): ICD-10-CM

## 2022-06-08 RX ORDER — DULOXETIN HYDROCHLORIDE 30 MG/1
30 CAPSULE, DELAYED RELEASE ORAL DAILY
Qty: 30 CAPSULE | Refills: 1 | Status: SHIPPED | OUTPATIENT
Start: 2022-06-08 | End: 2022-08-05

## 2022-08-05 DIAGNOSIS — F33.1 MODERATE EPISODE OF RECURRENT MAJOR DEPRESSIVE DISORDER (HCC): ICD-10-CM

## 2022-08-05 RX ORDER — DULOXETIN HYDROCHLORIDE 30 MG/1
CAPSULE, DELAYED RELEASE ORAL
Qty: 30 CAPSULE | Refills: 0 | Status: SHIPPED | OUTPATIENT
Start: 2022-08-05 | End: 2022-08-19 | Stop reason: SDUPTHER

## 2022-08-05 NOTE — TELEPHONE ENCOUNTER
Last OV: 3/25/2022  Next OV: Visit date not found  Recommended 3 month follow up      Last fill:6/8/22  Refills:1

## 2022-08-19 ENCOUNTER — OFFICE VISIT (OUTPATIENT)
Dept: PSYCHIATRY | Age: 47
End: 2022-08-19
Payer: OTHER GOVERNMENT

## 2022-08-19 VITALS
HEART RATE: 89 BPM | HEIGHT: 62 IN | SYSTOLIC BLOOD PRESSURE: 122 MMHG | OXYGEN SATURATION: 98 % | BODY MASS INDEX: 34.23 KG/M2 | RESPIRATION RATE: 16 BRPM | DIASTOLIC BLOOD PRESSURE: 70 MMHG | WEIGHT: 186 LBS

## 2022-08-19 DIAGNOSIS — F33.1 MODERATE EPISODE OF RECURRENT MAJOR DEPRESSIVE DISORDER (HCC): Primary | ICD-10-CM

## 2022-08-19 PROCEDURE — G8417 CALC BMI ABV UP PARAM F/U: HCPCS | Performed by: PSYCHIATRY & NEUROLOGY

## 2022-08-19 PROCEDURE — 99214 OFFICE O/P EST MOD 30 MIN: CPT | Performed by: PSYCHIATRY & NEUROLOGY

## 2022-08-19 PROCEDURE — G8427 DOCREV CUR MEDS BY ELIG CLIN: HCPCS | Performed by: PSYCHIATRY & NEUROLOGY

## 2022-08-19 PROCEDURE — 1036F TOBACCO NON-USER: CPT | Performed by: PSYCHIATRY & NEUROLOGY

## 2022-08-19 RX ORDER — DULOXETIN HYDROCHLORIDE 60 MG/1
60 CAPSULE, DELAYED RELEASE ORAL DAILY
Qty: 90 CAPSULE | Refills: 1 | Status: SHIPPED | OUTPATIENT
Start: 2022-08-19

## 2022-08-19 RX ORDER — TRAZODONE HYDROCHLORIDE 50 MG/1
TABLET ORAL
Qty: 45 TABLET | Refills: 5 | Status: SHIPPED | OUTPATIENT
Start: 2022-08-19 | End: 2022-08-19

## 2022-08-19 RX ORDER — TRAZODONE HYDROCHLORIDE 50 MG/1
50 TABLET ORAL NIGHTLY
Qty: 90 TABLET | Refills: 1 | Status: SHIPPED | OUTPATIENT
Start: 2022-08-19

## 2022-08-19 RX ORDER — BUPROPION HYDROCHLORIDE 150 MG/1
150 TABLET ORAL EVERY MORNING
Qty: 90 TABLET | Refills: 1 | Status: SHIPPED | OUTPATIENT
Start: 2022-08-19

## 2022-08-19 RX ORDER — DULOXETIN HYDROCHLORIDE 30 MG/1
CAPSULE, DELAYED RELEASE ORAL
Qty: 90 CAPSULE | Refills: 1 | Status: SHIPPED | OUTPATIENT
Start: 2022-08-19

## 2022-08-19 NOTE — PROGRESS NOTES
PSYCHIATRY PROGRESS NOTE        Ginette Patterson  1975  08/19/2022  PCP: NIKOLE Posadas - CNP    CC:   Chief Complaint   Patient presents with    Depression         S:   Ended up calling off her wedding in June. Recently significant other moved out as they are . She recognized his negativity, not engaging in communication well with her, not wanting to do couples therapy or get help, not taking his own antidepressants and using MJ and alcohol to cope was getting to be too much. Since his departure she can already see less tension. Can be emotionally difficult to think of past positive aspects of the relationship. Sometimes a sense of guilt that she put too much on him at times and didn't do enough to help. Feels a little nervous about handling the household and finances on her own but knows she has done it before and is capable of managing things ok. Daughter with mental health issues as well which can be tough, but already sees a sense of her being better after the separation. Struggling with weight loss. Trying to eat keto meals. She has however had increased in her LDL over the last couple yrs. ROS: No palpitations or chest pain. Brief Psych Hx:  Hosp: [facility] 2014, [facility] in 2016  Diagnoses: depression, anxiety  Meds: Wellbutrin (at first somewhat helpful), duloxetine (since 2015, helps joint pain), restoril, clonazepam. Prazosin. Early 20's tried sertraline ? Not helpful despite taking for a couple yrs. atomoxetine (somewhat helpful at 60mg)  [facility]: [facility] [place] [facility] to [facility], [facility] NP through [facility]. Was seeing a therapist there as well but therapist left the practice.    Suicide Attempts: twice (2014 - OD on medications, 2015 OD on medications)     Current psychiatric medications:  Duloxetine 90mg daily  Trazodone 50-75mg qhs prn sleep    O:  Vitals:    08/19/22 0933   BP: 122/70   Pulse: 89   Resp: 16   SpO2: 98%     PHQ Scores 3/25/2022 2/24/2020 1/13/2020 11/11/2019 6/5/2019 8/16/2017   PHQ2 Score 2 2 6 6 4 2   PHQ9 Score 3 8 22 21 19 2     Interpretation of Total Score Depression Severity: 1-4 = Minimal depression, 5-9 = Mild depression, 10-14 = Moderate depression, 15-19 = Moderately severe depression, 20-27 = Severe depression     NATHAN 7 SCORE 2/24/2020 11/11/2019   NATHAN-7 Total Score 14 16     Interpretation of NATHAN-7 score: 5-9 = mild anxiety, 10-14 = moderate anxiety, 15+ = severe anxiety. Recommend referral to behavioral health for scores 10 or greater. Mental Status Exam:   Appearance    alert, cooperative  Motor: No abnormal movements, tics or mannerisms. Speech    spontaneous, normal rate and normal volume  Mood/Affect   ok /fair motility and range, tearful at times appropriate to content  Thought Process    linear, goal directed and coherent  Thought Content    intact , no suicidal ideation  Associations    logical connections  Attention/Concentration    intact  Memory    recent and remote memory intact  Insight/Judgement    Good / Intact    Labs:  Lab Results   Component Value Date    TSHFT4 2.83 01/11/2021    TSH 1.24 03/25/2022     Lab Results   Component Value Date    ZWXJHXLY99 408 04/27/2022     Lab Results   Component Value Date    WBC 5.6 03/25/2022    HGB 14.6 03/25/2022    HCT 43.2 03/25/2022    MCV 93.0 03/25/2022     03/25/2022     Lab Results   Component Value Date    LDLCALC 148 (H) 03/25/2022     A:  39 yo F with depression and anxiety. Dealing with recent separation which will probably provide more positive changes than negative. 1. Major depressive disorder, recurrent, moderate  2. Trauma and stressor related disorder    P:   1. Continue duloxetine 90mg daily  2. Will add back wellbutrin XL 150mg daily as she previously was on wellbutrin prior to trying the strattera.    3.  Continue trazodone 50mg qhs     Follow-up: RTC in 6 weeks      Sushant Javed MD  Psychiatrist

## 2023-02-10 NOTE — PROGRESS NOTES
PSYCHIATRY PROGRESS NOTE        Reymundo Trujillo  1975  01/21/22  PCP: Ree Kee, APRN - CNP    CC:   Chief Complaint   Patient presents with    Depression         S:   Pt was last seen in June. Since that that she has been dealing with increased pain. She was tried on lyrica but felt this made her gain weight after 3 months and she had to stop this. She has been feeling more depressed, low motivation to do things, more anhedonia. Not doing things she used to enjoy like getting ready for work, going out on the weekends with fiance. Feels overall her home life and relationship have been going great, so she doesn't understand why she is so tired and poorly motivated most of the time. She does feel the strattera has helped her thoughts feel less jumbled in her mind and focus seems a little better. Work hasn't been stressful. THERAPY MODALITIES: behavioral  THERAPY GOALS:  Improve motivation for change  THERAPY NOTES: discussed ways to stay motivated and benefits of identifying simple goals and being consistent with them even if she doesn't have the initial desire to do these. Discussed focusing on positive reinforcement. Gave suggestion to use a habit tracking josy to help with motivation and tracking of her goals, or planning activities and goals with others to add accountability. ROS: No palpitations or chest pain. +back pain    Brief Psych Hx:  [facility], [facility]  Diagnoses: depression, anxiety  Meds: Wellbutrin (at first somewhat helpful), duloxetine (since 2015, helps joint pain), restoril, clonazepam. Early 20's tried sertraline ? Not helpful despite taking for a couple yrs. atomoxetine (somewhat helpful at 60mg)  [facility]pt: [facility] [place] prior to 2012, [facility] since 2015 NP through [facility]. Was seeing a therapist there as well but therapist left the practice.    Suicide Attempts: twice (2014 - OD on medications, 2015 OD on medications)       Current Spoke with the patient in regards to their Lab results, explained per Dr. Shaun Salvador that his labs looked good and that he can continue his HCTZ at 25 mg daily. Patient understood and stated that he is running low on the Stiolto Sample that was given to him and wanted to know if we can send a prescription to his pharmacy. Stiolto has been pended to his preferred pharmacy. Patient understood the results and did not have any further questions or concerns at this time. Dr. Shaun Salvador, can you please sign off on the Stiolto when you are able to? Thank you so much!  // Patito GILES Outpatient Medications   Medication Sig Dispense Refill    DULoxetine (CYMBALTA) 30 MG extended release capsule Take 1 capsule by mouth daily Take with 60mg dose for a total of 90mg daily 30 capsule 3    atomoxetine (STRATTERA) 40 MG capsule Take 1 capsule by mouth daily for 14 days 14 capsule 0    atomoxetine (STRATTERA) 25 MG capsule Take 1 capsule by mouth daily for 14 days Start after completion of 40mg capsules (14 day supply) 14 capsule 0    DULoxetine (CYMBALTA) 60 MG extended release capsule TAKE ONE CAPSULE BY MOUTH DAILY 30 capsule 0    pregabalin (LYRICA) 100 MG capsule Take 100 mg by mouth 2 times daily.  amLODIPine (NORVASC) 10 MG tablet TAKE ONE TABLET BY MOUTH DAILY 90 tablet 1    cyanocobalamin 1000 MCG/ML injection INJECT ONE MILLILITER INTO THE MUSCLE EVERY 30 DAYS 1 mL 4    traZODone (DESYREL) 50 MG tablet Take 1-1.5 tablets by mouth nightly as needed for Sleep 45 tablet 5    valsartan-hydroCHLOROthiazide (DIOVAN-HCT) 160-25 MG per tablet TAKE ONE TABLET BY MOUTH DAILY 30 tablet 5    ondansetron (ZOFRAN) 4 MG tablet Take 1 tablet by mouth daily as needed for Nausea or Vomiting 10 tablet 0    oxyCODONE (ROXICODONE) 5 MG immediate release tablet  (Patient not taking: Reported on 6/25/2021)      SYRINGE-NEEDLE, DISP, 3 ML 25G X 1\" 3 ML MISC 1 applicator by Does not apply route every 30 days 12 each 0    Insulin Pen Needle 31G X 5 MM MISC 1 each by Does not apply route daily 100 each 3     No current facility-administered medications for this visit.        O:  Wt Readings from Last 3 Encounters:   10/19/21 176 lb (79.8 kg)   06/25/21 170 lb (77.1 kg)   01/11/21 180 lb (81.6 kg)     Temp Readings from Last 3 Encounters:   10/19/21 97.7 °F (36.5 °C)   06/25/21 97.6 °F (36.4 °C)   01/11/21 97.6 °F (36.4 °C)     BP Readings from Last 3 Encounters:   10/19/21 136/68   06/25/21 120/80   01/11/21 118/84     Pulse Readings from Last 3 Encounters:   10/19/21 80   06/25/21 100   01/11/21 96 Mental Status Exam:   Appearance    alert, cooperative  Motor: No abnormal movements, tics or mannerisms. Speech    spontaneous, normal rate and normal volume  Mood/Affect    Depressed /fair motility and range  Thought Process    linear, goal directed and coherent  Thought Content    intact , no suicidal ideation  Associations    logical connections  Attention/Concentration    intact  Memory    recent and remote memory intact  Insight/Judgement    Good / Intact    Labs:     Orders Only on 12/30/2021   Component Date Value Ref Range Status    SARS-CoV-2 12/30/2021 Detected* Not detected Final    Comment: This test has been authorized by FDA under an  Emergency Use Authorization (EUA). This test is only authorized for the duration of the  time of declaration that circumstances exist justifying the  authorization of the emergency use of in vitro diagnostic  testing for detection of the SARS-CoV-2 virus  and/or diagnosis of COVID-19 infection under  section 564 (b)(1) of the Act, 21 U. S.C. 840UJN-2 (b) (1),  unless the authorization is terminated or revoked sooner. Patient Fact [email or web address]  Provider Fact [email or web address]    METHODOLOGY: RT PCR         A:  39 yo F with depression and anxiety. Doing better with her stressors, but having more pain and depression despite this. Perhaps the reduction of the duloxetine has made some of these things worse. Sometimes atomoxetine can also cause fatigue. We may try focusing on improving the depression with more duloxetine and reducing combined medications. 1. Major depressive disorder, recurrent, moderate  2. Trauma and stressor related disorder  3. ADHD, predominantly inattentive type      P:   1. Reduce atomoxetine to 40mg daily for 2 weeks, then 25mg daily for 2 weeks then stop  2. Will increase duloxetine to 90mg daily after stopping atomoxetine.    3.  Reduce prazosin 1mg qhs to every other night for 1-2 weeks, then stop. I dont think she needs this anymore. 4.  Continue trazodone 50-75mg qhs prn sleep   5. We may recommend Light therapy [id number] lux 30min qam again if she isn't improving  6. Suggested habit tracking josy    I spent 16 min on psychotherapy with the patient.      Follow-up: RTC in 6 weeks      Deedee Schneider MD  Psychiatrist

## 2023-02-16 ENCOUNTER — TELEPHONE (OUTPATIENT)
Dept: INTERNAL MEDICINE CLINIC | Age: 48
End: 2023-02-16

## 2023-02-16 ENCOUNTER — OFFICE VISIT (OUTPATIENT)
Dept: INTERNAL MEDICINE CLINIC | Age: 48
End: 2023-02-16

## 2023-02-16 VITALS
WEIGHT: 196.2 LBS | DIASTOLIC BLOOD PRESSURE: 80 MMHG | OXYGEN SATURATION: 99 % | BODY MASS INDEX: 36.1 KG/M2 | HEIGHT: 62 IN | SYSTOLIC BLOOD PRESSURE: 134 MMHG | HEART RATE: 100 BPM | TEMPERATURE: 97.5 F

## 2023-02-16 DIAGNOSIS — N30.00 ACUTE CYSTITIS WITHOUT HEMATURIA: ICD-10-CM

## 2023-02-16 DIAGNOSIS — F33.1 MODERATE EPISODE OF RECURRENT MAJOR DEPRESSIVE DISORDER (HCC): ICD-10-CM

## 2023-02-16 DIAGNOSIS — H10.32 ACUTE BACTERIAL CONJUNCTIVITIS OF LEFT EYE: Primary | ICD-10-CM

## 2023-02-16 DIAGNOSIS — R35.0 URINARY FREQUENCY: ICD-10-CM

## 2023-02-16 LAB
BILIRUBIN, POC: NEGATIVE
BLOOD URINE, POC: NORMAL
CLARITY, POC: NORMAL
COLOR, POC: NORMAL
GLUCOSE URINE, POC: NEGATIVE
KETONES, POC: NEGATIVE
LEUKOCYTE EST, POC: NORMAL
NITRITE, POC: NEGATIVE
PH, POC: 7.5
PROTEIN, POC: NEGATIVE
SPECIFIC GRAVITY, POC: 1.02
UROBILINOGEN, POC: 0.2

## 2023-02-16 RX ORDER — POLYMYXIN B SULFATE AND TRIMETHOPRIM 1; 10000 MG/ML; [USP'U]/ML
1 SOLUTION OPHTHALMIC 4 TIMES DAILY
Qty: 10 ML | Refills: 0 | Status: SHIPPED | OUTPATIENT
Start: 2023-02-16 | End: 2023-02-21

## 2023-02-16 RX ORDER — BACITRACIN ZINC AND POLYMYXIN B SULFATE 500; 1000 [USP'U]/G; [USP'U]/G
OINTMENT TOPICAL
Qty: 15 G | Refills: 0 | Status: SHIPPED | OUTPATIENT
Start: 2023-02-16 | End: 2023-02-23

## 2023-02-16 RX ORDER — CIPROFLOXACIN 500 MG/1
500 TABLET, FILM COATED ORAL 2 TIMES DAILY
Qty: 6 TABLET | Refills: 0 | Status: SHIPPED | OUTPATIENT
Start: 2023-02-16 | End: 2023-02-19

## 2023-02-16 RX ORDER — DULOXETIN HYDROCHLORIDE 60 MG/1
CAPSULE, DELAYED RELEASE ORAL
Qty: 90 CAPSULE | Refills: 0 | Status: SHIPPED | OUTPATIENT
Start: 2023-02-16 | End: 2023-03-03

## 2023-02-16 SDOH — ECONOMIC STABILITY: HOUSING INSECURITY
IN THE LAST 12 MONTHS, WAS THERE A TIME WHEN YOU DID NOT HAVE A STEADY PLACE TO SLEEP OR SLEPT IN A SHELTER (INCLUDING NOW)?: NO

## 2023-02-16 SDOH — ECONOMIC STABILITY: FOOD INSECURITY: WITHIN THE PAST 12 MONTHS, THE FOOD YOU BOUGHT JUST DIDN'T LAST AND YOU DIDN'T HAVE MONEY TO GET MORE.: NEVER TRUE

## 2023-02-16 SDOH — ECONOMIC STABILITY: INCOME INSECURITY: HOW HARD IS IT FOR YOU TO PAY FOR THE VERY BASICS LIKE FOOD, HOUSING, MEDICAL CARE, AND HEATING?: NOT HARD AT ALL

## 2023-02-16 SDOH — ECONOMIC STABILITY: FOOD INSECURITY: WITHIN THE PAST 12 MONTHS, YOU WORRIED THAT YOUR FOOD WOULD RUN OUT BEFORE YOU GOT MONEY TO BUY MORE.: NEVER TRUE

## 2023-02-16 ASSESSMENT — ENCOUNTER SYMPTOMS
EYE DISCHARGE: 1
EYE ITCHING: 1
EYE REDNESS: 1
GASTROINTESTINAL NEGATIVE: 1
EYE PAIN: 0
PHOTOPHOBIA: 1

## 2023-02-16 NOTE — PROGRESS NOTES
SUBJECTIVE:    Patient ID: Kodak Xiong is a 56 y.o. female. CC: Eye redness and swelling, urinary symptoms     HPI: The patient presents to the office for an acute visit. Nasal congestion over the weekend. Sore throat earlier in the week. Kids have been sick. Wednesday morning awakened with eye matted shut. Eye now red, swollen, burning, watering. Vision blurry but no loss of vision. Eye is burning but no pain with eye movement. No eye injury. No treatment at home. Few weeks of urinary symptoms. Urinary urgency, hesitancy, dysuria. No hematuria. No back or abdominal pain. No treatments at home. Current Outpatient Medications   Medication Sig Dispense Refill    DULoxetine (CYMBALTA) 60 MG extended release capsule Take 1 capsule by mouth daily 90 capsule 1    DULoxetine (CYMBALTA) 30 MG extended release capsule TAKE ONE CAPSULE BY MOUTH DAILY. TAKE WITH 60 MG DOSE FOR A TOTAL OF 90 MG 90 capsule 1    buPROPion (WELLBUTRIN XL) 150 MG extended release tablet Take 1 tablet by mouth every morning 90 tablet 1    traZODone (DESYREL) 50 MG tablet Take 1 tablet by mouth nightly 90 tablet 1    traMADol (ULTRAM) 50 MG tablet       gabapentin (NEURONTIN) 600 MG tablet       valsartan-hydroCHLOROthiazide (DIOVAN-HCT) 160-25 MG per tablet TAKE ONE TABLET BY MOUTH DAILY 90 tablet 3    amLODIPine (NORVASC) 10 MG tablet TAKE ONE TABLET BY MOUTH DAILY 90 tablet 3    cyanocobalamin 1000 MCG/ML injection INJECT ONE MILLILITER INTRAMUSCULARLY EVERY 30 DAYS 3 mL 3    Semaglutide 3 MG TABS Take 1 tablet by mouth daily 30 tablet 2    SYRINGE-NEEDLE, DISP, 3 ML 25G X 1\" 3 ML MISC 1 applicator by Does not apply route every 30 days 12 each 0    Insulin Pen Needle 31G X 5 MM MISC 1 each by Does not apply route daily 100 each 3     No current facility-administered medications for this visit. Review of Systems   Constitutional:  Negative for chills and fever.    Eyes:  Positive for photophobia, discharge, redness, itching and visual disturbance. Negative for pain. Cardiovascular: Negative. Gastrointestinal: Negative. Genitourinary:  Positive for dysuria, frequency and urgency. Negative for flank pain and hematuria. OBJECTIVE:  Physical Exam  Constitutional:       General: She is not in acute distress. Appearance: She is not ill-appearing or toxic-appearing. HENT:      Head: Left periorbital erythema present. Eyes:      General:         Left eye: Discharge present. Extraocular Movements: Extraocular movements intact. Conjunctiva/sclera:      Left eye: Left conjunctiva is injected. No exudate or hemorrhage. Cardiovascular:      Rate and Rhythm: Normal rate and regular rhythm. Pulmonary:      Effort: Pulmonary effort is normal.      Breath sounds: Normal breath sounds. Abdominal:      General: There is no distension. Palpations: Abdomen is soft. Tenderness: There is no abdominal tenderness. There is no guarding. Neurological:      Mental Status: She is alert. /80   Pulse 100   Temp 97.5 °F (36.4 °C)   Ht 5' 2\" (1.575 m)   Wt 196 lb 3.2 oz (89 kg)   SpO2 99%   BMI 35.89 kg/m²      PHQ Scores 3/25/2022 2/24/2020 1/13/2020 11/11/2019 6/5/2019 8/16/2017   PHQ2 Score 2 2 6 6 4 2   PHQ9 Score 3 8 22 21 19 2     Interpretation of Total Score Depression Severity: 1-4 = Minimal depression, 5-9 = Mild depression, 10-14 = Moderate depression, 15-19 = Moderately severe depression, 20-27 =Severe depression        ASSESSMENT/PLAN:  David Gandhi was seen today for conjunctivitis, sinusitis and urinary frequency. Diagnoses and all orders for this visit:    Acute bacterial conjunctivitis of left eye  -     trimethoprim-polymyxin b (POLYTRIM) 24494-1.1 UNIT/ML-% ophthalmic solution; Place 1 drop into the left eye in the morning, at noon, in the evening, and at bedtime for 5 days    Acute cystitis without hematuria  -     ciprofloxacin (CIPRO) 500 MG tablet;  Take 1 tablet by mouth 2 times daily for 3 days    Urinary frequency  -     POCT Urinalysis no Micro    -Denies eye pain, pain with eye movement, or loss of vision  -Redness about the left eye with inflamed conjunctive   -No fever  -Urinary symptoms with urinalysis positive for leukocytes and microscopic hematuria  -Treat acutely for conjunctivitis. -Treat acutely for urinary tract infection  -Recommended she contact her eye care professional or go to ER for red flag eye symptoms. Follow-up if symptoms are worsening or failing to improve. Off tomorrow due to conjunctivitis.   RTW Monday      NIKOLE Roque - CNP

## 2023-02-16 NOTE — TELEPHONE ENCOUNTER
Pt in office today for pink eye---the Pharmacy is out of the Polytrim it is on [address]---is there something else you can send in for her? Please call Ike Ya at [phone]. Thanks.

## 2023-02-16 NOTE — LETTER
[facility]  [address] [address]  Phone: [phone]  Fax: [phone]    NIKOLE Garcia CNP        February 16, 2023     Patient: Kodak Xiong   YOB: 1975   Date of Visit: 2/16/2023       To Whom it May Concern:    Elena Maurice was seen in my clinic on 2/16/2023. She may return to work on 2/20/2023  If you have any questions or concerns, please don't hesitate to call.     Sincerely,         NIKOLE Garcia CNP

## 2023-02-27 ENCOUNTER — TELEPHONE (OUTPATIENT)
Dept: INTERNAL MEDICINE CLINIC | Age: 48
End: 2023-02-27

## 2023-02-27 NOTE — TELEPHONE ENCOUNTER
----- Message from Essence Smith sent at 2/27/2023  4:08 PM EST -----  Subject: Message to Provider    QUESTIONS  Information for Provider? Patient was prescribed medication for a UTI and   said she is still having symptoms. All medication is gone   ---------------------------------------------------------------------------  --------------  Makayla Mcgill INFO  [phone]; OK to leave message on voicemail  ---------------------------------------------------------------------------  --------------  SCRIPT ANSWERS  Relationship to Patient?  Self

## 2023-02-27 NOTE — TELEPHONE ENCOUNTER
Krista Mitchell, This patient was seen 2/16/23. Would you recommend she schedules another appointment?

## 2023-03-03 ENCOUNTER — OFFICE VISIT (OUTPATIENT)
Dept: PSYCHIATRY | Age: 48
End: 2023-03-03
Payer: OTHER GOVERNMENT

## 2023-03-03 ENCOUNTER — OFFICE VISIT (OUTPATIENT)
Dept: INTERNAL MEDICINE CLINIC | Age: 48
End: 2023-03-03

## 2023-03-03 VITALS
HEART RATE: 86 BPM | OXYGEN SATURATION: 97 % | SYSTOLIC BLOOD PRESSURE: 118 MMHG | WEIGHT: 195 LBS | DIASTOLIC BLOOD PRESSURE: 72 MMHG | HEIGHT: 62 IN | RESPIRATION RATE: 16 BRPM | BODY MASS INDEX: 35.88 KG/M2

## 2023-03-03 VITALS
BODY MASS INDEX: 35.67 KG/M2 | DIASTOLIC BLOOD PRESSURE: 72 MMHG | OXYGEN SATURATION: 97 % | WEIGHT: 195 LBS | SYSTOLIC BLOOD PRESSURE: 118 MMHG | HEART RATE: 86 BPM

## 2023-03-03 DIAGNOSIS — R73.09 ELEVATED GLUCOSE: ICD-10-CM

## 2023-03-03 DIAGNOSIS — E66.01 CLASS 2 SEVERE OBESITY DUE TO EXCESS CALORIES WITH SERIOUS COMORBIDITY AND BODY MASS INDEX (BMI) OF 35.0 TO 35.9 IN ADULT (HCC): ICD-10-CM

## 2023-03-03 DIAGNOSIS — R63.5 WEIGHT GAIN: ICD-10-CM

## 2023-03-03 DIAGNOSIS — I10 ESSENTIAL HYPERTENSION: ICD-10-CM

## 2023-03-03 DIAGNOSIS — F33.1 MODERATE EPISODE OF RECURRENT MAJOR DEPRESSIVE DISORDER (HCC): Primary | ICD-10-CM

## 2023-03-03 DIAGNOSIS — F33.1 MODERATE EPISODE OF RECURRENT MAJOR DEPRESSIVE DISORDER (HCC): ICD-10-CM

## 2023-03-03 DIAGNOSIS — Z13.220 LIPID SCREENING: ICD-10-CM

## 2023-03-03 DIAGNOSIS — F41.9 ANXIETY: ICD-10-CM

## 2023-03-03 DIAGNOSIS — E66.09 CLASS 1 OBESITY DUE TO EXCESS CALORIES WITH SERIOUS COMORBIDITY AND BODY MASS INDEX (BMI) OF 33.0 TO 33.9 IN ADULT: ICD-10-CM

## 2023-03-03 DIAGNOSIS — F33.41 RECURRENT MAJOR DEPRESSIVE DISORDER, IN PARTIAL REMISSION (HCC): ICD-10-CM

## 2023-03-03 DIAGNOSIS — R35.0 URINARY FREQUENCY: Primary | ICD-10-CM

## 2023-03-03 DIAGNOSIS — F43.9 TRAUMA AND STRESSOR-RELATED DISORDER: ICD-10-CM

## 2023-03-03 PROBLEM — E66.812 CLASS 2 SEVERE OBESITY DUE TO EXCESS CALORIES WITH SERIOUS COMORBIDITY AND BODY MASS INDEX (BMI) OF 35.0 TO 35.9 IN ADULT: Status: ACTIVE | Noted: 2019-10-16

## 2023-03-03 LAB
A/G RATIO: 2.1 (ref 1.1–2.2)
ALBUMIN SERPL-MCNC: 4.6 G/DL (ref 3.4–5)
ALP BLD-CCNC: 70 U/L (ref 40–129)
ALT SERPL-CCNC: 60 U/L (ref 10–40)
ANION GAP SERPL CALCULATED.3IONS-SCNC: 16 MMOL/L (ref 3–16)
AST SERPL-CCNC: 43 U/L (ref 15–37)
BILIRUB SERPL-MCNC: 0.5 MG/DL (ref 0–1)
BILIRUBIN, POC: NORMAL
BLOOD URINE, POC: NORMAL
BUN BLDV-MCNC: 11 MG/DL (ref 7–20)
CALCIUM SERPL-MCNC: 9.3 MG/DL (ref 8.3–10.6)
CHLORIDE BLD-SCNC: 99 MMOL/L (ref 99–110)
CHOLESTEROL, TOTAL: 178 MG/DL (ref 0–199)
CLARITY, POC: CLEAR
CO2: 25 MMOL/L (ref 21–32)
COLOR, POC: YELLOW
CREAT SERPL-MCNC: 0.8 MG/DL (ref 0.6–1.1)
GFR SERPL CREATININE-BSD FRML MDRD: >60 ML/MIN/{1.73_M2}
GLUCOSE BLD-MCNC: 129 MG/DL (ref 70–99)
GLUCOSE URINE, POC: NORMAL
HCT VFR BLD CALC: 40.2 % (ref 36–48)
HDLC SERPL-MCNC: 56 MG/DL (ref 40–60)
HEMOGLOBIN: 13.4 G/DL (ref 12–16)
KETONES, POC: NORMAL
LDL CHOLESTEROL CALCULATED: 94 MG/DL
LEUKOCYTE EST, POC: NORMAL
MCH RBC QN AUTO: 31.8 PG (ref 26–34)
MCHC RBC AUTO-ENTMCNC: 33.5 G/DL (ref 31–36)
MCV RBC AUTO: 95 FL (ref 80–100)
NITRITE, POC: NORMAL
PDW BLD-RTO: 12.9 % (ref 12.4–15.4)
PH, POC: 5.5
PLATELET # BLD: 226 K/UL (ref 135–450)
PMV BLD AUTO: 10.2 FL (ref 5–10.5)
POTASSIUM SERPL-SCNC: 3.9 MMOL/L (ref 3.5–5.1)
PROTEIN, POC: NORMAL
RBC # BLD: 4.23 M/UL (ref 4–5.2)
SODIUM BLD-SCNC: 140 MMOL/L (ref 136–145)
SPECIFIC GRAVITY, POC: 1.02
TOTAL PROTEIN: 6.8 G/DL (ref 6.4–8.2)
TRIGL SERPL-MCNC: 139 MG/DL (ref 0–150)
TSH REFLEX FT4: 2.1 UIU/ML (ref 0.27–4.2)
UROBILINOGEN, POC: 0.2
VLDLC SERPL CALC-MCNC: 28 MG/DL
WBC # BLD: 6.2 K/UL (ref 4–11)

## 2023-03-03 PROCEDURE — 3074F SYST BP LT 130 MM HG: CPT | Performed by: PSYCHIATRY & NEUROLOGY

## 2023-03-03 PROCEDURE — G8427 DOCREV CUR MEDS BY ELIG CLIN: HCPCS | Performed by: PSYCHIATRY & NEUROLOGY

## 2023-03-03 PROCEDURE — G8484 FLU IMMUNIZE NO ADMIN: HCPCS | Performed by: PSYCHIATRY & NEUROLOGY

## 2023-03-03 PROCEDURE — 99214 OFFICE O/P EST MOD 30 MIN: CPT | Performed by: PSYCHIATRY & NEUROLOGY

## 2023-03-03 PROCEDURE — 1036F TOBACCO NON-USER: CPT | Performed by: PSYCHIATRY & NEUROLOGY

## 2023-03-03 PROCEDURE — G8417 CALC BMI ABV UP PARAM F/U: HCPCS | Performed by: PSYCHIATRY & NEUROLOGY

## 2023-03-03 PROCEDURE — 3078F DIAST BP <80 MM HG: CPT | Performed by: PSYCHIATRY & NEUROLOGY

## 2023-03-03 RX ORDER — TRAZODONE HYDROCHLORIDE 50 MG/1
50 TABLET ORAL NIGHTLY
Qty: 90 TABLET | Refills: 1 | Status: SHIPPED | OUTPATIENT
Start: 2023-03-03

## 2023-03-03 RX ORDER — BUPROPION HYDROCHLORIDE 300 MG/1
300 TABLET ORAL EVERY MORNING
Qty: 90 TABLET | Refills: 1 | Status: SHIPPED | OUTPATIENT
Start: 2023-03-03

## 2023-03-03 RX ORDER — DULOXETIN HYDROCHLORIDE 60 MG/1
CAPSULE, DELAYED RELEASE ORAL
Qty: 90 CAPSULE | Refills: 1 | Status: SHIPPED | OUTPATIENT
Start: 2023-03-03

## 2023-03-03 RX ORDER — DULOXETIN HYDROCHLORIDE 30 MG/1
CAPSULE, DELAYED RELEASE ORAL
Qty: 90 CAPSULE | Refills: 1 | Status: SHIPPED | OUTPATIENT
Start: 2023-03-03

## 2023-03-03 ASSESSMENT — PATIENT HEALTH QUESTIONNAIRE - PHQ9
1. LITTLE INTEREST OR PLEASURE IN DOING THINGS: 2
7. TROUBLE CONCENTRATING ON THINGS, SUCH AS READING THE NEWSPAPER OR WATCHING TELEVISION: 2
SUM OF ALL RESPONSES TO PHQ QUESTIONS 1-9: 16
SUM OF ALL RESPONSES TO PHQ9 QUESTIONS 1 & 2: 4
2. FEELING DOWN, DEPRESSED OR HOPELESS: 2
10. IF YOU CHECKED OFF ANY PROBLEMS, HOW DIFFICULT HAVE THESE PROBLEMS MADE IT FOR YOU TO DO YOUR WORK, TAKE CARE OF THINGS AT HOME, OR GET ALONG WITH OTHER PEOPLE: 2
9. THOUGHTS THAT YOU WOULD BE BETTER OFF DEAD, OR OF HURTING YOURSELF: 1
SUM OF ALL RESPONSES TO PHQ QUESTIONS 1-9: 17
4. FEELING TIRED OR HAVING LITTLE ENERGY: 3
SUM OF ALL RESPONSES TO PHQ QUESTIONS 1-9: 17
SUM OF ALL RESPONSES TO PHQ QUESTIONS 1-9: 17
6. FEELING BAD ABOUT YOURSELF - OR THAT YOU ARE A FAILURE OR HAVE LET YOURSELF OR YOUR FAMILY DOWN: 2
8. MOVING OR SPEAKING SO SLOWLY THAT OTHER PEOPLE COULD HAVE NOTICED. OR THE OPPOSITE, BEING SO FIGETY OR RESTLESS THAT YOU HAVE BEEN MOVING AROUND A LOT MORE THAN USUAL: 1
5. POOR APPETITE OR OVEREATING: 2
3. TROUBLE FALLING OR STAYING ASLEEP: 2

## 2023-03-03 ASSESSMENT — ANXIETY QUESTIONNAIRES
4. TROUBLE RELAXING: 3
2. NOT BEING ABLE TO STOP OR CONTROL WORRYING: 1
GAD7 TOTAL SCORE: 10
6. BECOMING EASILY ANNOYED OR IRRITABLE: 1
5. BEING SO RESTLESS THAT IT IS HARD TO SIT STILL: 1
7. FEELING AFRAID AS IF SOMETHING AWFUL MIGHT HAPPEN: 1
1. FEELING NERVOUS, ANXIOUS, OR ON EDGE: 1
3. WORRYING TOO MUCH ABOUT DIFFERENT THINGS: 2
IF YOU CHECKED OFF ANY PROBLEMS ON THIS QUESTIONNAIRE, HOW DIFFICULT HAVE THESE PROBLEMS MADE IT FOR YOU TO DO YOUR WORK, TAKE CARE OF THINGS AT HOME, OR GET ALONG WITH OTHER PEOPLE: VERY DIFFICULT

## 2023-03-03 ASSESSMENT — COLUMBIA-SUICIDE SEVERITY RATING SCALE - C-SSRS
2. HAVE YOU ACTUALLY HAD ANY THOUGHTS OF KILLING YOURSELF?: NO
6. HAVE YOU EVER DONE ANYTHING, STARTED TO DO ANYTHING, OR PREPARED TO DO ANYTHING TO END YOUR LIFE?: NO
1. WITHIN THE PAST MONTH, HAVE YOU WISHED YOU WERE DEAD OR WISHED YOU COULD GO TO SLEEP AND NOT WAKE UP?: NO

## 2023-03-03 NOTE — PROGRESS NOTES
3/3/23     Chief Complaint   Patient presents with    Urinary Tract Infection     Returning from 2/16/23    Colonoscopy     Was done in 2013 @ . Due for a new one        HPI    Recently here on 2/16 and treated for UTI with cipro   Symptoms improved but not resolved. Still having some urgency and frequency, not as bad. Dysuria has resolved. Weight has been getting worse. Reports she is exercising and doing well with diet but despite doing this she continues to gain. Insurance denied liraglutide in 2020 and semaglutide last year. She is due for her annual exam and blood work, appt is scheduled for next week. No Known Allergies    Current Outpatient Medications   Medication Sig Dispense Refill    buPROPion (WELLBUTRIN XL) 300 MG extended release tablet Take 1 tablet by mouth every morning 90 tablet 1    DULoxetine (CYMBALTA) 30 MG extended release capsule TAKE ONE CAPSULE BY MOUTH DAILY. TAKE WITH 60 MG DOSE FOR A TOTAL OF 90 MG 90 capsule 1    DULoxetine (CYMBALTA) 60 MG extended release capsule TAKE ONE CAPSULE BY MOUTH DAILY 90 capsule 1    traZODone (DESYREL) 50 MG tablet Take 1 tablet by mouth nightly 90 tablet 1    traMADol (ULTRAM) 50 MG tablet       gabapentin (NEURONTIN) 600 MG tablet       valsartan-hydroCHLOROthiazide (DIOVAN-HCT) 160-25 MG per tablet TAKE ONE TABLET BY MOUTH DAILY 90 tablet 3    amLODIPine (NORVASC) 10 MG tablet TAKE ONE TABLET BY MOUTH DAILY 90 tablet 3    cyanocobalamin 1000 MCG/ML injection INJECT ONE MILLILITER INTRAMUSCULARLY EVERY 30 DAYS 3 mL 3    Semaglutide 3 MG TABS Take 1 tablet by mouth daily 30 tablet 2    SYRINGE-NEEDLE, DISP, 3 ML 25G X 1\" 3 ML MISC 1 applicator by Does not apply route every 30 days 12 each 0    Insulin Pen Needle 31G X 5 MM MISC 1 each by Does not apply route daily 100 each 3     No current facility-administered medications for this visit.      Review of Systems  Negative other than HPI     Vitals:    03/03/23 1157   BP: 118/72   Pulse: 86 SpO2: 97%   Weight: 195 lb (88.5 kg)      Physical Exam  Constitutional:       General: She is not in acute distress. Appearance: Normal appearance. She is obese. She is not ill-appearing. HENT:      Head: Normocephalic and atraumatic. Pulmonary:      Effort: Pulmonary effort is normal. No respiratory distress. Neurological:      Mental Status: She is alert and oriented to person, place, and time. Mental status is at baseline. Psychiatric:         Mood and Affect: Mood normal.         Behavior: Behavior normal.     Assessment/Plan:  Urinary frequency  Acute, improving but not resolved. UA today is normal. Will treat if culture positive. Reviewed supportive care. Reviewed criteria for follow up. - POCT Urinalysis no Micro  - Culture, Urine    Essential hypertension  Chronic, stable, controlled. Continue diovan 160/25 mg and amlodipine 10 mg daily. BP is controlled. Due for labs - ordered. - Comprehensive Metabolic Panel; Future  - CBC; Future    Recurrent major depressive disorder, in partial remission (HCC)/ Anxiety/ Moderate episode of recurrent major depressive disorder (HCC)  Chronic, seeing psychiatry, Dr. Arianna Fatima and she is increasing wellbutrin for better control. Continue with plan per psychiatry. Class 2 obesity due to excess calories with serious comorbidity   Chronic, uncontrolled. Worsening. Checking labs. Continue working on diet/ exercise. Follow up next week to discuss alternate medication options. Insurance declined liraglutide for weight management in 2020 and semaglutide last year. - TSH with Reflex to FT4; Future    Lipid screening  Due for labs   - Lipid Panel; Future    Elevated glucose  Due for labs   - Hemoglobin A1C; Future    Discussed medications with patient, who voiced understanding of their use and indications. All questions answered.     Keep upcoming appt for annual /weight     Electronically signed by NIKOLE Pablo CNP on 3/3/2023 at 12:38 PM

## 2023-03-03 NOTE — PROGRESS NOTES
PSYCHIATRY PROGRESS NOTE        Ike Grace  1975  03/03/2023  PCP: Snehal Harvey, NIKOLE - CNP    CC:   Chief Complaint   Patient presents with    Depression    ADHD         S:   Struggling with weight gain. Despite trying to make good diet changes she continues to gain weight. Feels this is making her more sluggish. She is also noticing more problems with concentration, forgetfulness especially over the last couple weeks. Doesn't get a lot of time free so hasn't been able to exercise. These things are making her feel more depressed. Sleep is broken often, trazodone still helps. Her home is very disorganized and cluttered and this causes more stress. She is trying to get her friend to help her declutter, which she feels she desparately needs help with. Tram Adhikari and her lives separately but they have still maintained a relationship. ROS: +weight leoncio    Brief Psych Hx:  Hosp: [facility] 2014, [place] in 2016  Diagnoses: depression, anxiety  Meds: Wellbutrin (at first somewhat helpful), duloxetine (since 2015, helps joint pain), restoril, clonazepam. Prazosin. Early 20's tried sertraline ? Not helpful despite taking for a couple yrs. atomoxetine (somewhat helpful at 60mg)  [facility] [place] [facility] to [facility], [facility] NP through [facility]. Was seeing a therapist there as well but therapist left the practice.    Suicide Attempts: twice (2014 - OD on medications, 2015 OD on medications)     Current psychiatric medications:  Duloxetine 90mg daily  Wellbutrin XL 150mg daily  Trazodone 50-75mg qhs prn sleep    O:  Vitals:    03/03/23 0929   BP: 118/72   Pulse: 86   Resp: 16   SpO2: 97%     PHQ Scores 3/3/2023 3/25/2022 2/24/2020 1/13/2020 11/11/2019 6/5/2019 8/16/2017   PHQ2 Score 4 2 2 6 6 4 2   PHQ9 Score 17 3 8 22 21 19 2     NATHAN 7 SCORE 3/3/2023 2/24/2020 11/11/2019   NATHAN-7 Total Score 10 - -   NATHAN-7 Total Score - 14 16     Mental Status Exam:   Appearance    alert, cooperative  Motor: No abnormal movements, tics or mannerisms. Speech    spontaneous, normal rate and normal volume  Mood/Affect   depressed / good motility and range  Thought Process    linear, goal directed and coherent  Thought Content    intact , no suicidal ideation  Associations    logical connections  Attention/Concentration    intact  Memory    recent and remote memory intact  Insight/Judgement    Good / Intact    Labs:  Lab Results   Component Value Date    TSHFT4 2.83 01/11/2021    TSH 1.24 03/25/2022     Lab Results   Component Value Date    VKPYOFRJ76 408 04/27/2022     Lab Results   Component Value Date    WBC 5.6 03/25/2022    HGB 14.6 03/25/2022    HCT 43.2 03/25/2022    MCV 93.0 03/25/2022     03/25/2022     Lab Results   Component Value Date    LDLCALC 148 (H) 03/25/2022     A:  57 yo F with depression and anxiety. Dealing with more focus issues, motivation problems, and weight gain. The weight gain may be exacerbating her energy / focus issues. But depression could be contributing to the whole picture. 1. Major depressive disorder, recurrent, moderate  2. Trauma and stressor related disorder  3. Weight gain    P:   1. Continue duloxetine 90mg daily  2. Increase wellbutrin XL to 300mg daily  3. Continue trazodone 50mg qhs   4. Work on [place] your home, and increasing exercise  5.  Seeing PCP today and plans to request a TSH which seems appropriate given the weight gain / energy issues    Follow-up: RTC in 3 months      Arik Cruz MD  Psychiatrist

## 2023-03-04 LAB
ESTIMATED AVERAGE GLUCOSE: 99.7 MG/DL
HBA1C MFR BLD: 5.1 %

## 2023-03-05 LAB
ORGANISM: ABNORMAL
URINE CULTURE, ROUTINE: ABNORMAL

## 2023-03-06 DIAGNOSIS — R79.89 ELEVATED LFTS: Primary | ICD-10-CM

## 2023-03-06 RX ORDER — NITROFURANTOIN 25; 75 MG/1; MG/1
100 CAPSULE ORAL 2 TIMES DAILY
Qty: 10 CAPSULE | Refills: 0 | Status: SHIPPED | OUTPATIENT
Start: 2023-03-06 | End: 2023-03-11

## 2023-03-10 ENCOUNTER — HOSPITAL ENCOUNTER (OUTPATIENT)
Dept: ULTRASOUND IMAGING | Age: 48
Discharge: HOME | End: 2023-03-10
Payer: OTHER GOVERNMENT

## 2023-03-10 DIAGNOSIS — R79.89 ELEVATED LFTS: ICD-10-CM

## 2023-03-10 LAB
ALBUMIN SERPL-MCNC: 4.2 G/DL (ref 3.4–5)
ALP BLD-CCNC: 66 U/L (ref 40–129)
ALT SERPL-CCNC: 86 U/L (ref 10–40)
AST SERPL-CCNC: 69 U/L (ref 15–37)
BILIRUB SERPL-MCNC: 0.4 MG/DL (ref 0–1)
BILIRUBIN DIRECT: <0.2 MG/DL (ref 0–0.3)
BILIRUBIN, INDIRECT: ABNORMAL MG/DL (ref 0–1)
TOTAL PROTEIN: 6.4 G/DL (ref 6.4–8.2)

## 2023-03-10 PROCEDURE — 76705 ECHO EXAM OF ABDOMEN: CPT

## 2023-03-16 ENCOUNTER — TELEPHONE (OUTPATIENT)
Dept: INTERNAL MEDICINE CLINIC | Age: 48
End: 2023-03-16

## 2023-03-16 NOTE — TELEPHONE ENCOUNTER
Lab still noted elevated liver enzymes. US showed fatty liver which can be controlled with low fat diet/ exercise. The fatty liver can cause elevation in LFTs. The gallstones can cause elevation as well if they are blocking the duct but it does not appear this is the case. We will plan to repeat labs in a few months or sooner if starts having concerns.

## 2023-03-28 ENCOUNTER — TELEPHONE (OUTPATIENT)
Dept: INTERNAL MEDICINE CLINIC | Age: 48
End: 2023-03-28

## 2023-03-28 NOTE — TELEPHONE ENCOUNTER
Called pt to advise she does need a ER f/u to determine what she needs to do from here. She states Thursdays are the best day for her. Please call to schedule. Wanted to try to get in prior too next week if possible.

## 2023-03-28 NOTE — TELEPHONE ENCOUNTER
----- Message from [address] sent at 3/28/2023  2:50 PM EDT -----  Subject: Message to Provider    QUESTIONS  Information for Provider? PT was in ER over weekend with gall bladder   attack. It was terribly painful. PT wanted to know if should f/u in office   or if a referral to have removed is next step? Please call to f/u.  ---------------------------------------------------------------------------  --------------  Mari Tijerina INFO  [phone]; OK to leave message on voicemail  ---------------------------------------------------------------------------  --------------  SCRIPT ANSWERS  Relationship to Patient?  Self

## 2023-04-04 DIAGNOSIS — E53.8 VITAMIN B12 DEFICIENCY: ICD-10-CM

## 2023-04-05 RX ORDER — CYANOCOBALAMIN 1000 UG/ML
INJECTION, SOLUTION INTRAMUSCULAR; SUBCUTANEOUS
Qty: 1 ML | Refills: 1 | Status: SHIPPED | OUTPATIENT
Start: 2023-04-05

## 2023-05-04 DIAGNOSIS — I10 ESSENTIAL HYPERTENSION: ICD-10-CM

## 2023-05-04 RX ORDER — VALSARTAN AND HYDROCHLOROTHIAZIDE 160; 25 MG/1; MG/1
TABLET ORAL
Qty: 30 TABLET | Refills: 0 | Status: SHIPPED | OUTPATIENT
Start: 2023-05-04

## 2023-06-02 ENCOUNTER — OFFICE VISIT (OUTPATIENT)
Dept: PSYCHIATRY | Age: 48
End: 2023-06-02
Payer: OTHER GOVERNMENT

## 2023-06-02 VITALS
HEART RATE: 98 BPM | OXYGEN SATURATION: 98 % | RESPIRATION RATE: 16 BRPM | BODY MASS INDEX: 35.51 KG/M2 | SYSTOLIC BLOOD PRESSURE: 116 MMHG | WEIGHT: 193 LBS | DIASTOLIC BLOOD PRESSURE: 70 MMHG | HEIGHT: 62 IN

## 2023-06-02 DIAGNOSIS — F33.41 RECURRENT MAJOR DEPRESSIVE DISORDER, IN PARTIAL REMISSION (HCC): ICD-10-CM

## 2023-06-02 PROCEDURE — 99214 OFFICE O/P EST MOD 30 MIN: CPT | Performed by: PSYCHIATRY & NEUROLOGY

## 2023-06-02 PROCEDURE — G8417 CALC BMI ABV UP PARAM F/U: HCPCS | Performed by: PSYCHIATRY & NEUROLOGY

## 2023-06-02 PROCEDURE — 3074F SYST BP LT 130 MM HG: CPT | Performed by: PSYCHIATRY & NEUROLOGY

## 2023-06-02 PROCEDURE — 3078F DIAST BP <80 MM HG: CPT | Performed by: PSYCHIATRY & NEUROLOGY

## 2023-06-02 PROCEDURE — 1036F TOBACCO NON-USER: CPT | Performed by: PSYCHIATRY & NEUROLOGY

## 2023-06-02 PROCEDURE — G8427 DOCREV CUR MEDS BY ELIG CLIN: HCPCS | Performed by: PSYCHIATRY & NEUROLOGY

## 2023-06-02 RX ORDER — BUPROPION HYDROCHLORIDE 300 MG/1
300 TABLET ORAL EVERY MORNING
Qty: 90 TABLET | Refills: 1 | Status: SHIPPED | OUTPATIENT
Start: 2023-06-02

## 2023-06-02 RX ORDER — DULOXETIN HYDROCHLORIDE 30 MG/1
CAPSULE, DELAYED RELEASE ORAL
Qty: 90 CAPSULE | Refills: 1 | Status: SHIPPED | OUTPATIENT
Start: 2023-06-02

## 2023-06-02 RX ORDER — DULOXETIN HYDROCHLORIDE 60 MG/1
CAPSULE, DELAYED RELEASE ORAL
Qty: 90 CAPSULE | Refills: 1 | Status: SHIPPED | OUTPATIENT
Start: 2023-06-02

## 2023-06-02 RX ORDER — TRAZODONE HYDROCHLORIDE 50 MG/1
50 TABLET ORAL NIGHTLY
Qty: 90 TABLET | Refills: 1 | Status: SHIPPED | OUTPATIENT
Start: 2023-06-02

## 2023-06-02 ASSESSMENT — PATIENT HEALTH QUESTIONNAIRE - PHQ9
SUM OF ALL RESPONSES TO PHQ9 QUESTIONS 1 & 2: 2
8. MOVING OR SPEAKING SO SLOWLY THAT OTHER PEOPLE COULD HAVE NOTICED. OR THE OPPOSITE, BEING SO FIGETY OR RESTLESS THAT YOU HAVE BEEN MOVING AROUND A LOT MORE THAN USUAL: 0
7. TROUBLE CONCENTRATING ON THINGS, SUCH AS READING THE NEWSPAPER OR WATCHING TELEVISION: 1
SUM OF ALL RESPONSES TO PHQ QUESTIONS 1-9: 7
10. IF YOU CHECKED OFF ANY PROBLEMS, HOW DIFFICULT HAVE THESE PROBLEMS MADE IT FOR YOU TO DO YOUR WORK, TAKE CARE OF THINGS AT HOME, OR GET ALONG WITH OTHER PEOPLE: 1
5. POOR APPETITE OR OVEREATING: 1
9. THOUGHTS THAT YOU WOULD BE BETTER OFF DEAD, OR OF HURTING YOURSELF: 0
SUM OF ALL RESPONSES TO PHQ QUESTIONS 1-9: 7
3. TROUBLE FALLING OR STAYING ASLEEP: 1
1. LITTLE INTEREST OR PLEASURE IN DOING THINGS: 1
4. FEELING TIRED OR HAVING LITTLE ENERGY: 1
SUM OF ALL RESPONSES TO PHQ QUESTIONS 1-9: 7
SUM OF ALL RESPONSES TO PHQ QUESTIONS 1-9: 7
2. FEELING DOWN, DEPRESSED OR HOPELESS: 1
6. FEELING BAD ABOUT YOURSELF - OR THAT YOU ARE A FAILURE OR HAVE LET YOURSELF OR YOUR FAMILY DOWN: 1

## 2023-06-02 ASSESSMENT — ANXIETY QUESTIONNAIRES
4. TROUBLE RELAXING: 2
1. FEELING NERVOUS, ANXIOUS, OR ON EDGE: 1
IF YOU CHECKED OFF ANY PROBLEMS ON THIS QUESTIONNAIRE, HOW DIFFICULT HAVE THESE PROBLEMS MADE IT FOR YOU TO DO YOUR WORK, TAKE CARE OF THINGS AT HOME, OR GET ALONG WITH OTHER PEOPLE: SOMEWHAT DIFFICULT
7. FEELING AFRAID AS IF SOMETHING AWFUL MIGHT HAPPEN: 0
5. BEING SO RESTLESS THAT IT IS HARD TO SIT STILL: 0
2. NOT BEING ABLE TO STOP OR CONTROL WORRYING: 1
3. WORRYING TOO MUCH ABOUT DIFFERENT THINGS: 1
6. BECOMING EASILY ANNOYED OR IRRITABLE: 1
GAD7 TOTAL SCORE: 6

## 2023-06-02 NOTE — PROGRESS NOTES
11/11/2019 6/5/2019   PHQ2 Score 2 4 2 2 6 6 4   PHQ9 Score 7 17 3 8 22 21 19     NATHAN 7 SCORE 6/2/2023 3/3/2023 2/24/2020 11/11/2019   NATHAN-7 Total Score 6 10 - -   NATHAN-7 Total Score - - 14 16     Mental Status Exam:   Appearance    alert, cooperative  Motor: No abnormal movements, tics or mannerisms. Speech    spontaneous, normal rate and normal volume  Mood/Affect   ok / good motility and range  Thought Process    linear, goal directed and coherent  Thought Content    intact , no suicidal ideation  Associations    logical connections  Attention/Concentration    intact  Memory    recent and remote memory intact  Insight/Judgement    Good / Intact    Labs:  Lab Results   Component Value Date    TSHFT4 2.10 03/03/2023    TSH 1.24 03/25/2022     Lab Results   Component Value Date    [id number] 408 04/27/2022     Lab Results   Component Value Date    WBC 6.2 03/03/2023    HGB 13.4 03/03/2023    HCT 40.2 03/03/2023    MCV 95.0 03/03/2023     03/03/2023     Lab Results   Component Value Date    LDLCALC 94 03/03/2023     A:  57 yo F with depression and anxiety. Seems to be in good spirits at this time, ending her relationship seems to have helped for now. Wellbutrin dose changed may have helped. 1. Major depressive disorder, recurrent, in partial remission  P:   1. Continue duloxetine 90mg daily  2. Continue wellbutrin XL 300mg daily  3. Continue trazodone 50mg qhs   4. Working on exercise / diet.      Follow-up: RTC in 3 months    I spent 30 min on this visit including face to face time, chart review, documentation and orders    Tonya Russell MD  Psychiatrist

## 2023-06-06 DIAGNOSIS — I10 ESSENTIAL HYPERTENSION: ICD-10-CM

## 2023-06-06 RX ORDER — VALSARTAN AND HYDROCHLOROTHIAZIDE 160; 25 MG/1; MG/1
TABLET ORAL
Qty: 30 TABLET | Refills: 0 | OUTPATIENT
Start: 2023-06-06

## 2023-07-03 DIAGNOSIS — I10 ESSENTIAL HYPERTENSION: ICD-10-CM

## 2023-07-05 RX ORDER — VALSARTAN AND HYDROCHLOROTHIAZIDE 160; 25 MG/1; MG/1
TABLET ORAL
Qty: 30 TABLET | Refills: 0 | OUTPATIENT
Start: 2023-07-05

## 2023-08-07 DIAGNOSIS — I10 ESSENTIAL HYPERTENSION: ICD-10-CM

## 2023-08-07 RX ORDER — VALSARTAN AND HYDROCHLOROTHIAZIDE 160; 25 MG/1; MG/1
1 TABLET ORAL DAILY
Qty: 30 TABLET | Refills: 0 | Status: SHIPPED | OUTPATIENT
Start: 2023-08-07 | End: 2023-08-13 | Stop reason: SDUPTHER

## 2023-08-07 NOTE — TELEPHONE ENCOUNTER
Pt states her appt in March was for a physical. She states her Bloodwork and everything was completed at that appt. I advised patient it was done as a physical.  Claudia Li is out of the office this week. Patient would like to get a refill on her valsartan. Advised we can send a 30 day supply to pharmacy and also send a message to Iza Sarah to send over for longer term and also advise if she needs a f/u appt.

## 2023-08-13 RX ORDER — VALSARTAN AND HYDROCHLOROTHIAZIDE 160; 25 MG/1; MG/1
1 TABLET ORAL DAILY
Qty: 90 TABLET | Refills: 1 | Status: SHIPPED | OUTPATIENT
Start: 2023-08-13

## 2023-09-01 RX ORDER — TRAZODONE HYDROCHLORIDE 50 MG/1
TABLET ORAL
Qty: 90 TABLET | Refills: 1 | Status: SHIPPED | OUTPATIENT
Start: 2023-09-01

## 2023-09-01 NOTE — TELEPHONE ENCOUNTER
Please advise Dr Ariana Jennings is out of the office     Medication:   Requested Prescriptions     Pending Prescriptions Disp Refills    traZODone (DESYREL) 50 MG tablet [Pharmacy Med Name: traZODone 50 MG TABLET] 90 tablet 1     Sig: TAKE ONE TABLET BY MOUTH ONCE NIGHTLY       Last Filled:  6/2/2023    Patient Phone Number: [phone] (home)     Last appt: 6/2/2023   Next appt: 9/8/2023

## 2023-09-06 DIAGNOSIS — I10 ESSENTIAL HYPERTENSION: ICD-10-CM

## 2023-09-06 RX ORDER — AMLODIPINE BESYLATE 10 MG/1
TABLET ORAL
Qty: 90 TABLET | Refills: 3 | OUTPATIENT
Start: 2023-09-06

## 2023-09-08 ENCOUNTER — OFFICE VISIT (OUTPATIENT)
Dept: PSYCHIATRY | Age: 48
End: 2023-09-08
Payer: OTHER GOVERNMENT

## 2023-09-08 VITALS
DIASTOLIC BLOOD PRESSURE: 74 MMHG | HEIGHT: 62 IN | SYSTOLIC BLOOD PRESSURE: 118 MMHG | HEART RATE: 86 BPM | OXYGEN SATURATION: 96 % | RESPIRATION RATE: 16 BRPM | WEIGHT: 191 LBS | BODY MASS INDEX: 35.15 KG/M2

## 2023-09-08 DIAGNOSIS — F33.1 MODERATE EPISODE OF RECURRENT MAJOR DEPRESSIVE DISORDER (HCC): Primary | ICD-10-CM

## 2023-09-08 PROCEDURE — 99214 OFFICE O/P EST MOD 30 MIN: CPT | Performed by: PSYCHIATRY & NEUROLOGY

## 2023-09-08 PROCEDURE — 3078F DIAST BP <80 MM HG: CPT | Performed by: PSYCHIATRY & NEUROLOGY

## 2023-09-08 PROCEDURE — 3074F SYST BP LT 130 MM HG: CPT | Performed by: PSYCHIATRY & NEUROLOGY

## 2023-09-08 PROCEDURE — G8417 CALC BMI ABV UP PARAM F/U: HCPCS | Performed by: PSYCHIATRY & NEUROLOGY

## 2023-09-08 PROCEDURE — G8427 DOCREV CUR MEDS BY ELIG CLIN: HCPCS | Performed by: PSYCHIATRY & NEUROLOGY

## 2023-09-08 PROCEDURE — 1036F TOBACCO NON-USER: CPT | Performed by: PSYCHIATRY & NEUROLOGY

## 2023-09-08 ASSESSMENT — PATIENT HEALTH QUESTIONNAIRE - PHQ9
SUM OF ALL RESPONSES TO PHQ QUESTIONS 1-9: 16
1. LITTLE INTEREST OR PLEASURE IN DOING THINGS: 2
3. TROUBLE FALLING OR STAYING ASLEEP: 3
5. POOR APPETITE OR OVEREATING: 3
9. THOUGHTS THAT YOU WOULD BE BETTER OFF DEAD, OR OF HURTING YOURSELF: 0
SUM OF ALL RESPONSES TO PHQ QUESTIONS 1-9: 16
8. MOVING OR SPEAKING SO SLOWLY THAT OTHER PEOPLE COULD HAVE NOTICED. OR THE OPPOSITE, BEING SO FIGETY OR RESTLESS THAT YOU HAVE BEEN MOVING AROUND A LOT MORE THAN USUAL: 0
SUM OF ALL RESPONSES TO PHQ9 QUESTIONS 1 & 2: 3
2. FEELING DOWN, DEPRESSED OR HOPELESS: 1
10. IF YOU CHECKED OFF ANY PROBLEMS, HOW DIFFICULT HAVE THESE PROBLEMS MADE IT FOR YOU TO DO YOUR WORK, TAKE CARE OF THINGS AT HOME, OR GET ALONG WITH OTHER PEOPLE: 2
SUM OF ALL RESPONSES TO PHQ QUESTIONS 1-9: 16
SUM OF ALL RESPONSES TO PHQ QUESTIONS 1-9: 16
6. FEELING BAD ABOUT YOURSELF - OR THAT YOU ARE A FAILURE OR HAVE LET YOURSELF OR YOUR FAMILY DOWN: 1
4. FEELING TIRED OR HAVING LITTLE ENERGY: 3
7. TROUBLE CONCENTRATING ON THINGS, SUCH AS READING THE NEWSPAPER OR WATCHING TELEVISION: 3

## 2023-09-08 ASSESSMENT — ANXIETY QUESTIONNAIRES
6. BECOMING EASILY ANNOYED OR IRRITABLE: 2
4. TROUBLE RELAXING: 3
2. NOT BEING ABLE TO STOP OR CONTROL WORRYING: 1
1. FEELING NERVOUS, ANXIOUS, OR ON EDGE: 2
7. FEELING AFRAID AS IF SOMETHING AWFUL MIGHT HAPPEN: 0
GAD7 TOTAL SCORE: 10
5. BEING SO RESTLESS THAT IT IS HARD TO SIT STILL: 0
IF YOU CHECKED OFF ANY PROBLEMS ON THIS QUESTIONNAIRE, HOW DIFFICULT HAVE THESE PROBLEMS MADE IT FOR YOU TO DO YOUR WORK, TAKE CARE OF THINGS AT HOME, OR GET ALONG WITH OTHER PEOPLE: VERY DIFFICULT
3. WORRYING TOO MUCH ABOUT DIFFERENT THINGS: 2

## 2023-09-08 NOTE — PROGRESS NOTES
PSYCHIATRY PROGRESS NOTE        [facility]  1975  09/08/2023  PCP: NIKOLE Arzate - CNP    CC:   Chief Complaint   Patient presents with    Depression     S:   Pt is struggling with managing several stressors in her household, including managing the oppositional behaviors from her daughter, and the issues with her son (who is now in treatment which helps). Loosely back in her relationship and doing some couples therapy. Struggles with anehdonia, poor motivation and drive, fatigue, difficulty with procrastination and follow through. Working about 2 days per week. But since she has more free time is it hard to stay consistently motivated and have a steady schedule. ROS: +back pain    Brief Psych Hx:  Hosp: [address] 2014, [place] in 2016  Diagnoses: depression, anxiety  Meds: Wellbutrin (at first somewhat helpful), duloxetine (since 2015, helps joint pain), restoril, clonazepam. Prazosin. Early 20's tried sertraline ? Not helpful despite taking for a couple yrs. atomoxetine (somewhat helpful at 60mg)  [facility]: [facility] [place] prior to [facility], [facility] NP through [facility]. Was seeing a therapist there as well but therapist left the practice. Suicide Attempts: twice (2014 - OD on medications, 2015 OD on medications)     Current psychiatric medications:  Duloxetine 90mg daily  Wellbutrin XL 300mg daily  Trazodone 50-75mg qhs prn sleep    O:  Vitals:    09/08/23 1008   BP: 118/74   Pulse: 86   Resp: 16   SpO2: 96%     PHQ Scores 9/8/2023 6/2/2023 3/3/2023 3/25/2022 2/24/2020 1/13/2020 11/11/2019   PHQ2 Score 3 2 4 2 2 6 6   PHQ9 Score 16 7 17 3 8 22 21     NATHAN 7 SCORE 9/8/2023 6/2/2023 3/3/2023 2/24/2020 11/11/2019   NATHAN-7 Total Score 10 6 10 - -   NATHAN-7 Total Score - - - 14 16     Mental Status Exam:   Appearance    alert, cooperative  Motor: No abnormal movements, tics or mannerisms.   Speech    spontaneous, normal rate and normal volume  Mood/Affect   ok / good

## 2023-10-09 ENCOUNTER — OFFICE VISIT (OUTPATIENT)
Dept: INTERNAL MEDICINE CLINIC | Age: 48
End: 2023-10-09
Payer: OTHER GOVERNMENT

## 2023-10-09 VITALS
WEIGHT: 190 LBS | DIASTOLIC BLOOD PRESSURE: 86 MMHG | HEART RATE: 87 BPM | BODY MASS INDEX: 34.75 KG/M2 | OXYGEN SATURATION: 98 % | SYSTOLIC BLOOD PRESSURE: 134 MMHG

## 2023-10-09 DIAGNOSIS — E66.09 CLASS 1 OBESITY DUE TO EXCESS CALORIES WITH SERIOUS COMORBIDITY AND BODY MASS INDEX (BMI) OF 34.0 TO 34.9 IN ADULT: ICD-10-CM

## 2023-10-09 DIAGNOSIS — F41.9 ANXIETY: ICD-10-CM

## 2023-10-09 DIAGNOSIS — I10 ESSENTIAL HYPERTENSION: ICD-10-CM

## 2023-10-09 DIAGNOSIS — F33.41 RECURRENT MAJOR DEPRESSIVE DISORDER, IN PARTIAL REMISSION (HCC): ICD-10-CM

## 2023-10-09 DIAGNOSIS — K80.10 CALCULUS OF GALLBLADDER WITH CHRONIC CHOLECYSTITIS WITHOUT OBSTRUCTION: ICD-10-CM

## 2023-10-09 DIAGNOSIS — Z01.818 PREOP EXAM FOR INTERNAL MEDICINE: Primary | ICD-10-CM

## 2023-10-09 PROCEDURE — 1036F TOBACCO NON-USER: CPT | Performed by: NURSE PRACTITIONER

## 2023-10-09 PROCEDURE — G8427 DOCREV CUR MEDS BY ELIG CLIN: HCPCS | Performed by: NURSE PRACTITIONER

## 2023-10-09 PROCEDURE — 99214 OFFICE O/P EST MOD 30 MIN: CPT | Performed by: NURSE PRACTITIONER

## 2023-10-09 PROCEDURE — G8417 CALC BMI ABV UP PARAM F/U: HCPCS | Performed by: NURSE PRACTITIONER

## 2023-10-09 PROCEDURE — 3075F SYST BP GE 130 - 139MM HG: CPT | Performed by: NURSE PRACTITIONER

## 2023-10-09 PROCEDURE — G8484 FLU IMMUNIZE NO ADMIN: HCPCS | Performed by: NURSE PRACTITIONER

## 2023-10-09 PROCEDURE — 3079F DIAST BP 80-89 MM HG: CPT | Performed by: NURSE PRACTITIONER

## 2023-10-09 ASSESSMENT — ENCOUNTER SYMPTOMS
COUGH: 0
SHORTNESS OF BREATH: 0
WHEEZING: 0
CHEST TIGHTNESS: 0

## 2023-10-09 NOTE — ASSESSMENT & PLAN NOTE
Perioperative risk related to the patient's upcoming surgery is considered acceptable. Pt is cleared for surgery. DVT prophylaxis per surgery team.  Requested work up: none. Pre-op exam was completed on 10/9/23.

## 2023-10-09 NOTE — ASSESSMENT & PLAN NOTE
Chronic, controlled. Continue duloxetine, Wellbutrin and trazodone nightly for sleep. Continue seeing psychiatry.

## 2023-10-09 NOTE — PROGRESS NOTES
10/9/2023    This is a 53 y.o. female   Chief Complaint   Patient presents with    Pre-op Exam     10/20 Gallbladder @ [address] Maintenance     Has not had a recent colon cancer screening. Shona Pantera DAVIS    Moe Manley is a 53 y.o.  female who comes for a preoperative exam.  He  is referred by Dr. Drew Gilbert for perioperative risk determination for upcoming surgery for laparoscopic cholecystectomy on 10/20/2023. Denies taking any asa, blood thinners, fish oil, NSAIDs or herbals. Denies any previous complications with anesthesia. HTN - she has run out of her amlodipine. Doing well on losartan-hctz and home BP is running 120/70s. Still seeing Dr. Sd Ramiresr with physical rehab for chronic back pain. Past Medical History:   Diagnosis Date    Anxiety     Depression     Hypertension     Insomnia      Past Surgical History:   Procedure Laterality Date    BACK SURGERY      RODS AND SCREWS IN PLACE    BUNIONECTOMY Right 11/11/2019    LAPIDUS BUNIONECTOMY RIGHT FOOT WITH [place] OSTEOTOMY - MONI performed by Christina Oreilly DPM at [address] Right 2/28/2022    REMOVAL OF PAINFUL HARDWARE RIGHT FOOT-MONI performed by Christina Oreilly DPM at [address] History     Socioeconomic History    Marital status:      Spouse name: Not on file    Number of children: Not on file    Years of education: Not on file    Highest education level: Not on file   Occupational History    Not on file   Tobacco Use    Smoking status: Former     Years: 1     Types: Cigarettes    Smokeless tobacco: Never   Vaping Use    Vaping Use: Never used   Substance and Sexual Activity    Alcohol use:  Yes     Alcohol/week: 6.0 standard drinks of alcohol     Types: 6 Glasses of wine per week    Drug use: No    Sexual activity: Not on file   Other Topics Concern    Not on file   Social History Narrative    Not on file     Social Determinants of Health     Financial

## 2023-10-09 NOTE — ASSESSMENT & PLAN NOTE
Chronic, controlled. Continue valsartan-hydrochlorothiazide 160-25 mg daily. BP is controlled without amlodipine, will stay off for now.   Monitor BP at home

## 2023-11-08 PROBLEM — Z01.818 PREOP EXAM FOR INTERNAL MEDICINE: Status: RESOLVED | Noted: 2023-10-09 | Resolved: 2023-11-08

## 2023-12-06 ENCOUNTER — OFFICE VISIT (OUTPATIENT)
Dept: PSYCHIATRY | Age: 48
End: 2023-12-06
Payer: OTHER GOVERNMENT

## 2023-12-06 VITALS
OXYGEN SATURATION: 98 % | DIASTOLIC BLOOD PRESSURE: 74 MMHG | HEIGHT: 62 IN | BODY MASS INDEX: 34.96 KG/M2 | RESPIRATION RATE: 16 BRPM | WEIGHT: 190 LBS | HEART RATE: 74 BPM | SYSTOLIC BLOOD PRESSURE: 118 MMHG

## 2023-12-06 DIAGNOSIS — F33.1 MODERATE EPISODE OF RECURRENT MAJOR DEPRESSIVE DISORDER (HCC): Primary | ICD-10-CM

## 2023-12-06 PROCEDURE — G8484 FLU IMMUNIZE NO ADMIN: HCPCS | Performed by: PSYCHIATRY & NEUROLOGY

## 2023-12-06 PROCEDURE — 3078F DIAST BP <80 MM HG: CPT | Performed by: PSYCHIATRY & NEUROLOGY

## 2023-12-06 PROCEDURE — 1036F TOBACCO NON-USER: CPT | Performed by: PSYCHIATRY & NEUROLOGY

## 2023-12-06 PROCEDURE — 99215 OFFICE O/P EST HI 40 MIN: CPT | Performed by: PSYCHIATRY & NEUROLOGY

## 2023-12-06 PROCEDURE — 3074F SYST BP LT 130 MM HG: CPT | Performed by: PSYCHIATRY & NEUROLOGY

## 2023-12-06 PROCEDURE — G8427 DOCREV CUR MEDS BY ELIG CLIN: HCPCS | Performed by: PSYCHIATRY & NEUROLOGY

## 2023-12-06 PROCEDURE — G8417 CALC BMI ABV UP PARAM F/U: HCPCS | Performed by: PSYCHIATRY & NEUROLOGY

## 2023-12-06 RX ORDER — DULOXETIN HYDROCHLORIDE 60 MG/1
CAPSULE, DELAYED RELEASE ORAL
Qty: 90 CAPSULE | Refills: 0 | Status: SHIPPED | OUTPATIENT
Start: 2023-12-06

## 2023-12-06 RX ORDER — BUPROPION HYDROCHLORIDE 300 MG/1
300 TABLET ORAL EVERY MORNING
Qty: 90 TABLET | Refills: 0 | Status: SHIPPED | OUTPATIENT
Start: 2023-12-06

## 2023-12-06 RX ORDER — DULOXETIN HYDROCHLORIDE 30 MG/1
CAPSULE, DELAYED RELEASE ORAL
Qty: 90 CAPSULE | Refills: 0 | Status: SHIPPED | OUTPATIENT
Start: 2023-12-06

## 2023-12-06 ASSESSMENT — ANXIETY QUESTIONNAIRES
6. BECOMING EASILY ANNOYED OR IRRITABLE: 2
GAD7 TOTAL SCORE: 11
1. FEELING NERVOUS, ANXIOUS, OR ON EDGE: 2
7. FEELING AFRAID AS IF SOMETHING AWFUL MIGHT HAPPEN: 1
5. BEING SO RESTLESS THAT IT IS HARD TO SIT STILL: 1
2. NOT BEING ABLE TO STOP OR CONTROL WORRYING: 1
IF YOU CHECKED OFF ANY PROBLEMS ON THIS QUESTIONNAIRE, HOW DIFFICULT HAVE THESE PROBLEMS MADE IT FOR YOU TO DO YOUR WORK, TAKE CARE OF THINGS AT HOME, OR GET ALONG WITH OTHER PEOPLE: VERY DIFFICULT
4. TROUBLE RELAXING: 3
3. WORRYING TOO MUCH ABOUT DIFFERENT THINGS: 1

## 2023-12-06 ASSESSMENT — PATIENT HEALTH QUESTIONNAIRE - PHQ9
SUM OF ALL RESPONSES TO PHQ QUESTIONS 1-9: 18
1. LITTLE INTEREST OR PLEASURE IN DOING THINGS: 3
7. TROUBLE CONCENTRATING ON THINGS, SUCH AS READING THE NEWSPAPER OR WATCHING TELEVISION: 2
2. FEELING DOWN, DEPRESSED OR HOPELESS: 2
5. POOR APPETITE OR OVEREATING: 2
3. TROUBLE FALLING OR STAYING ASLEEP: 3
8. MOVING OR SPEAKING SO SLOWLY THAT OTHER PEOPLE COULD HAVE NOTICED. OR THE OPPOSITE, BEING SO FIGETY OR RESTLESS THAT YOU HAVE BEEN MOVING AROUND A LOT MORE THAN USUAL: 1
10. IF YOU CHECKED OFF ANY PROBLEMS, HOW DIFFICULT HAVE THESE PROBLEMS MADE IT FOR YOU TO DO YOUR WORK, TAKE CARE OF THINGS AT HOME, OR GET ALONG WITH OTHER PEOPLE: 2
SUM OF ALL RESPONSES TO PHQ QUESTIONS 1-9: 18
9. THOUGHTS THAT YOU WOULD BE BETTER OFF DEAD, OR OF HURTING YOURSELF: 0
4. FEELING TIRED OR HAVING LITTLE ENERGY: 3
SUM OF ALL RESPONSES TO PHQ9 QUESTIONS 1 & 2: 5
6. FEELING BAD ABOUT YOURSELF - OR THAT YOU ARE A FAILURE OR HAVE LET YOURSELF OR YOUR FAMILY DOWN: 2
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
## 2024-02-15 NOTE — TELEPHONE ENCOUNTER
Pt calling requesting refill of   - buPROPion (WELLBUTRIN XL) 300 MG extended release tablet   - traZODone (DESYREL) 50 MG tablet     Last written 12/6/23, 9/1/23  Last OV 12/6/23  Next OV 3/14/24    Please send to [facility] [address].

## 2024-02-16 RX ORDER — DULOXETIN HYDROCHLORIDE 60 MG/1
CAPSULE, DELAYED RELEASE ORAL
Qty: 90 CAPSULE | Refills: 0 | Status: SHIPPED | OUTPATIENT
Start: 2024-02-16

## 2024-02-16 RX ORDER — DULOXETIN HYDROCHLORIDE 30 MG/1
CAPSULE, DELAYED RELEASE ORAL
Qty: 90 CAPSULE | Refills: 0 | Status: SHIPPED | OUTPATIENT
Start: 2024-02-16

## 2024-02-16 RX ORDER — TRAZODONE HYDROCHLORIDE 50 MG/1
50 TABLET ORAL NIGHTLY
Qty: 90 TABLET | Refills: 1 | Status: SHIPPED | OUTPATIENT
Start: 2024-02-16

## 2024-02-16 RX ORDER — BUPROPION HYDROCHLORIDE 300 MG/1
300 TABLET ORAL EVERY MORNING
Qty: 90 TABLET | Refills: 0 | Status: SHIPPED | OUTPATIENT
Start: 2024-02-16

## 2024-02-16 NOTE — TELEPHONE ENCOUNTER
Medication:   Requested Prescriptions     Pending Prescriptions Disp Refills    buPROPion (WELLBUTRIN XL) 300 MG extended release tablet 90 tablet 0     Sig: Take 1 tablet by mouth every morning    traZODone (DESYREL) 50 MG tablet 90 tablet 1     Sig: Take 1 tablet by mouth nightly       Last Filled:      Patient Phone Number: [phone] (home)     Last appt: 10/9/2023   Next appt: Visit date not found

## 2024-03-03 DIAGNOSIS — F33.1 MODERATE EPISODE OF RECURRENT MAJOR DEPRESSIVE DISORDER (HCC): ICD-10-CM

## 2024-03-04 RX ORDER — CARIPRAZINE 1.5 MG/1
1.5 CAPSULE, GELATIN COATED ORAL DAILY
Qty: 30 CAPSULE | Refills: 2 | Status: SHIPPED | OUTPATIENT
Start: 2024-03-04

## 2024-03-04 NOTE — TELEPHONE ENCOUNTER
Medication:   Requested Prescriptions     Pending Prescriptions Disp Refills    VRAYLAR 1.5 MG capsule [Pharmacy Med Name: VRAYLAR 1.5 MG CAPSULE] 30 capsule 2     Sig: TAKE 1 CAPSULE BY MOUTH DAILY       Last Filled:  12/6/23    Patient Phone Number: [phone] (home)     Last appt: 12/6/2023   Next appt: 3/14/2024

## 2024-03-14 ENCOUNTER — OFFICE VISIT (OUTPATIENT)
Dept: PSYCHIATRY | Age: 49
End: 2024-03-14
Payer: OTHER GOVERNMENT

## 2024-03-14 VITALS
DIASTOLIC BLOOD PRESSURE: 78 MMHG | RESPIRATION RATE: 16 BRPM | WEIGHT: 203 LBS | OXYGEN SATURATION: 98 % | BODY MASS INDEX: 37.36 KG/M2 | HEIGHT: 62 IN | HEART RATE: 76 BPM | SYSTOLIC BLOOD PRESSURE: 118 MMHG

## 2024-03-14 DIAGNOSIS — M79.7 FIBROMYALGIA: ICD-10-CM

## 2024-03-14 DIAGNOSIS — F33.1 MODERATE EPISODE OF RECURRENT MAJOR DEPRESSIVE DISORDER (HCC): Primary | ICD-10-CM

## 2024-03-14 PROCEDURE — G8417 CALC BMI ABV UP PARAM F/U: HCPCS | Performed by: PSYCHIATRY & NEUROLOGY

## 2024-03-14 PROCEDURE — 99215 OFFICE O/P EST HI 40 MIN: CPT | Performed by: PSYCHIATRY & NEUROLOGY

## 2024-03-14 PROCEDURE — G8484 FLU IMMUNIZE NO ADMIN: HCPCS | Performed by: PSYCHIATRY & NEUROLOGY

## 2024-03-14 PROCEDURE — 3074F SYST BP LT 130 MM HG: CPT | Performed by: PSYCHIATRY & NEUROLOGY

## 2024-03-14 PROCEDURE — 3078F DIAST BP <80 MM HG: CPT | Performed by: PSYCHIATRY & NEUROLOGY

## 2024-03-14 PROCEDURE — 1036F TOBACCO NON-USER: CPT | Performed by: PSYCHIATRY & NEUROLOGY

## 2024-03-14 PROCEDURE — G8427 DOCREV CUR MEDS BY ELIG CLIN: HCPCS | Performed by: PSYCHIATRY & NEUROLOGY

## 2024-03-14 RX ORDER — LEVOMILNACIPRAN HYDROCHLORIDE 20 MG-40MG
KIT ORAL
Qty: 1 EACH | Refills: 0 | Status: SHIPPED | OUTPATIENT
Start: 2024-03-14

## 2024-03-14 ASSESSMENT — ANXIETY QUESTIONNAIRES
4. TROUBLE RELAXING: 3
7. FEELING AFRAID AS IF SOMETHING AWFUL MIGHT HAPPEN: 0
GAD7 TOTAL SCORE: 12
3. WORRYING TOO MUCH ABOUT DIFFERENT THINGS: 3
2. NOT BEING ABLE TO STOP OR CONTROL WORRYING: 2
IF YOU CHECKED OFF ANY PROBLEMS ON THIS QUESTIONNAIRE, HOW DIFFICULT HAVE THESE PROBLEMS MADE IT FOR YOU TO DO YOUR WORK, TAKE CARE OF THINGS AT HOME, OR GET ALONG WITH OTHER PEOPLE: EXTREMELY DIFFICULT
5. BEING SO RESTLESS THAT IT IS HARD TO SIT STILL: 0
6. BECOMING EASILY ANNOYED OR IRRITABLE: 1
1. FEELING NERVOUS, ANXIOUS, OR ON EDGE: 3

## 2024-03-14 ASSESSMENT — PATIENT HEALTH QUESTIONNAIRE - PHQ9
5. POOR APPETITE OR OVEREATING: 3
3. TROUBLE FALLING OR STAYING ASLEEP: 3
7. TROUBLE CONCENTRATING ON THINGS, SUCH AS READING THE NEWSPAPER OR WATCHING TELEVISION: 3
9. THOUGHTS THAT YOU WOULD BE BETTER OFF DEAD, OR OF HURTING YOURSELF: 0
SUM OF ALL RESPONSES TO PHQ QUESTIONS 1-9: 17
SUM OF ALL RESPONSES TO PHQ QUESTIONS 1-9: 17
2. FEELING DOWN, DEPRESSED OR HOPELESS: 1
8. MOVING OR SPEAKING SO SLOWLY THAT OTHER PEOPLE COULD HAVE NOTICED. OR THE OPPOSITE, BEING SO FIGETY OR RESTLESS THAT YOU HAVE BEEN MOVING AROUND A LOT MORE THAN USUAL: 0
4. FEELING TIRED OR HAVING LITTLE ENERGY: 3
SUM OF ALL RESPONSES TO PHQ9 QUESTIONS 1 & 2: 4
SUM OF ALL RESPONSES TO PHQ QUESTIONS 1-9: 17
6. FEELING BAD ABOUT YOURSELF - OR THAT YOU ARE A FAILURE OR HAVE LET YOURSELF OR YOUR FAMILY DOWN: 1
1. LITTLE INTEREST OR PLEASURE IN DOING THINGS: 3
SUM OF ALL RESPONSES TO PHQ QUESTIONS 1-9: 17

## 2024-03-14 NOTE — PROGRESS NOTES
Patient notes that she feels like the medication is effective and she has gained an significant amount of weight    
metabolizer through CYP2D6 based on genesight which may be impacting her response to duloxetine, as well as atomoxetine which we tried previously.     1. Major depressive disorder, recurrent, moderate  2. Fibromyalgia    P:   1.  Will taper off duloxetine to 60mg x 1 wk, then 30mg x 1 week then stop  2.  Will start levomilnacipran 20mg x 2 days, then increase to 40mg after reducing duloxetine to 30mg. Discussed r/b/se. Will increase to 80mg after 1 month.   3.  Continue wellbutrin XL 300mg daily.   4.  Continue trazodone 50mg qhs     Follow-up: RTC in 6 weeks    I spent 40 min this visit including face to face time, chart review, documentation and orders    Jimmy Oviedo MD  Psychiatrist

## 2024-03-18 ENCOUNTER — TELEPHONE (OUTPATIENT)
Dept: INTERNAL MEDICINE CLINIC | Age: 49
End: 2024-03-18

## 2024-03-18 NOTE — TELEPHONE ENCOUNTER
Najma from [facility] called in regards to the script that was sent for Fetzima 80mg. She states that it is on back order. They are wondering if Dr. Oviedo would be willing to split up the script to 20mg and 40mg which they do have. Please advise.

## 2024-03-19 ENCOUNTER — TELEPHONE (OUTPATIENT)
Dept: INTERNAL MEDICINE CLINIC | Age: 49
End: 2024-03-19

## 2024-03-19 ENCOUNTER — OFFICE VISIT (OUTPATIENT)
Dept: INTERNAL MEDICINE CLINIC | Age: 49
End: 2024-03-19
Payer: OTHER GOVERNMENT

## 2024-03-19 VITALS
HEART RATE: 79 BPM | BODY MASS INDEX: 37.36 KG/M2 | SYSTOLIC BLOOD PRESSURE: 136 MMHG | OXYGEN SATURATION: 98 % | DIASTOLIC BLOOD PRESSURE: 78 MMHG | HEIGHT: 62 IN | WEIGHT: 203 LBS

## 2024-03-19 DIAGNOSIS — Z12.31 ENCOUNTER FOR SCREENING MAMMOGRAM FOR MALIGNANT NEOPLASM OF BREAST: ICD-10-CM

## 2024-03-19 DIAGNOSIS — I10 ESSENTIAL HYPERTENSION: ICD-10-CM

## 2024-03-19 DIAGNOSIS — F33.41 RECURRENT MAJOR DEPRESSIVE DISORDER, IN PARTIAL REMISSION (HCC): ICD-10-CM

## 2024-03-19 DIAGNOSIS — R63.5 WEIGHT GAIN: ICD-10-CM

## 2024-03-19 DIAGNOSIS — Z12.11 COLON CANCER SCREENING: ICD-10-CM

## 2024-03-19 DIAGNOSIS — F41.9 ANXIETY: ICD-10-CM

## 2024-03-19 DIAGNOSIS — E66.01 CLASS 2 SEVERE OBESITY DUE TO EXCESS CALORIES WITH SERIOUS COMORBIDITY AND BODY MASS INDEX (BMI) OF 37.0 TO 37.9 IN ADULT (HCC): ICD-10-CM

## 2024-03-19 DIAGNOSIS — R53.83 FATIGUE, UNSPECIFIED TYPE: ICD-10-CM

## 2024-03-19 DIAGNOSIS — Z00.00 ENCOUNTER FOR ANNUAL PHYSICAL EXAM: Primary | ICD-10-CM

## 2024-03-19 DIAGNOSIS — E53.8 VITAMIN B12 DEFICIENCY: ICD-10-CM

## 2024-03-19 DIAGNOSIS — Z00.00 ENCOUNTER FOR ANNUAL PHYSICAL EXAM: ICD-10-CM

## 2024-03-19 DIAGNOSIS — Z71.89 ACP (ADVANCE CARE PLANNING): ICD-10-CM

## 2024-03-19 PROBLEM — E66.812 CLASS 2 SEVERE OBESITY DUE TO EXCESS CALORIES WITH SERIOUS COMORBIDITY AND BODY MASS INDEX (BMI) OF 37.0 TO 37.9 IN ADULT: Status: ACTIVE | Noted: 2019-10-16

## 2024-03-19 PROBLEM — Z87.828: Status: ACTIVE | Noted: 2018-08-21

## 2024-03-19 PROBLEM — S52.502A CLOSED FRACTURE OF LEFT DISTAL RADIUS: Status: RESOLVED | Noted: 2018-09-04 | Resolved: 2024-03-19

## 2024-03-19 PROBLEM — K80.10 CALCULUS OF GALLBLADDER WITH CHRONIC CHOLECYSTITIS WITHOUT OBSTRUCTION: Status: RESOLVED | Noted: 2023-10-03 | Resolved: 2024-03-19

## 2024-03-19 PROBLEM — E66.812 CLASS 2 SEVERE OBESITY DUE TO EXCESS CALORIES WITH SERIOUS COMORBIDITY AND BODY MASS INDEX (BMI) OF 37.0 TO 37.9 IN ADULT: Status: ACTIVE | Noted: 2024-03-19

## 2024-03-19 LAB
25(OH)D3 SERPL-MCNC: 28.8 NG/ML
DEPRECATED RDW RBC AUTO: 12.4 % (ref 12.4–15.4)
FOLATE SERPL-MCNC: 13.75 NG/ML (ref 4.78–24.2)
HCT VFR BLD AUTO: 40.2 % (ref 36–48)
HGB BLD-MCNC: 13.8 G/DL (ref 12–16)
MCH RBC QN AUTO: 32.6 PG (ref 26–34)
MCHC RBC AUTO-ENTMCNC: 34.4 G/DL (ref 31–36)
MCV RBC AUTO: 95 FL (ref 80–100)
PLATELET # BLD AUTO: 223 K/UL (ref 135–450)
PMV BLD AUTO: 9.7 FL (ref 5–10.5)
RBC # BLD AUTO: 4.23 M/UL (ref 4–5.2)
VIT B12 SERPL-MCNC: 430 PG/ML (ref 211–911)
WBC # BLD AUTO: 4.6 K/UL (ref 4–11)

## 2024-03-19 PROCEDURE — 99214 OFFICE O/P EST MOD 30 MIN: CPT | Performed by: NURSE PRACTITIONER

## 2024-03-19 PROCEDURE — 3078F DIAST BP <80 MM HG: CPT | Performed by: NURSE PRACTITIONER

## 2024-03-19 PROCEDURE — G8484 FLU IMMUNIZE NO ADMIN: HCPCS | Performed by: NURSE PRACTITIONER

## 2024-03-19 PROCEDURE — G8427 DOCREV CUR MEDS BY ELIG CLIN: HCPCS | Performed by: NURSE PRACTITIONER

## 2024-03-19 PROCEDURE — 99396 PREV VISIT EST AGE 40-64: CPT | Performed by: NURSE PRACTITIONER

## 2024-03-19 PROCEDURE — 1036F TOBACCO NON-USER: CPT | Performed by: NURSE PRACTITIONER

## 2024-03-19 PROCEDURE — 3075F SYST BP GE 130 - 139MM HG: CPT | Performed by: NURSE PRACTITIONER

## 2024-03-19 PROCEDURE — G8417 CALC BMI ABV UP PARAM F/U: HCPCS | Performed by: NURSE PRACTITIONER

## 2024-03-19 RX ORDER — VALSARTAN AND HYDROCHLOROTHIAZIDE 160; 25 MG/1; MG/1
1 TABLET ORAL DAILY
Qty: 90 TABLET | Refills: 1 | Status: SHIPPED | OUTPATIENT
Start: 2024-03-19

## 2024-03-19 RX ORDER — CYANOCOBALAMIN 1000 UG/ML
INJECTION, SOLUTION INTRAMUSCULAR; SUBCUTANEOUS
Qty: 1 ML | Refills: 1 | Status: SHIPPED | OUTPATIENT
Start: 2024-03-19

## 2024-03-19 SDOH — ECONOMIC STABILITY: INCOME INSECURITY: HOW HARD IS IT FOR YOU TO PAY FOR THE VERY BASICS LIKE FOOD, HOUSING, MEDICAL CARE, AND HEATING?: SOMEWHAT HARD

## 2024-03-19 SDOH — ECONOMIC STABILITY: FOOD INSECURITY: WITHIN THE PAST 12 MONTHS, THE FOOD YOU BOUGHT JUST DIDN'T LAST AND YOU DIDN'T HAVE MONEY TO GET MORE.: NEVER TRUE

## 2024-03-19 SDOH — ECONOMIC STABILITY: FOOD INSECURITY: WITHIN THE PAST 12 MONTHS, YOU WORRIED THAT YOUR FOOD WOULD RUN OUT BEFORE YOU GOT MONEY TO BUY MORE.: NEVER TRUE

## 2024-03-19 ASSESSMENT — ENCOUNTER SYMPTOMS
NAUSEA: 0
CHEST TIGHTNESS: 0
COUGH: 0
APNEA: 0
CONSTIPATION: 0
EYE PAIN: 0
WHEEZING: 0
SHORTNESS OF BREATH: 1
SINUS PRESSURE: 0
EYE REDNESS: 0
RHINORRHEA: 0
ABDOMINAL DISTENTION: 0
BLOOD IN STOOL: 0
VOMITING: 0
BACK PAIN: 0
ABDOMINAL PAIN: 0
DIARRHEA: 0

## 2024-03-19 NOTE — ASSESSMENT & PLAN NOTE
Chronic, uncontrolled. Continue vitamin B12 injections. Labs ordered.  Declined seeing sleep medicine.

## 2024-03-19 NOTE — TELEPHONE ENCOUNTER
Patient called stating she was prescribed fetzima 80mg and was told by pharmacy that this medication is not available. States that pharmacy tried ordering it but it was unavailable from manufacture.

## 2024-03-19 NOTE — ASSESSMENT & PLAN NOTE
Chronic, uncontrolled.  Continue Wellbutrin, fetzima, and trazodone nightly for sleep.  Continue seeing psychiatry.

## 2024-03-19 NOTE — PATIENT INSTRUCTIONS
Advance Care Planning     Advance Care Planning opens a door to talk about and write down your wishes before a sudden accident or illness.  Make your goals, values, and preferences known.     This puts you in the ’s seat and helps others know what matters most to you so they won’t have to guess.      Where can you learn more?    Go to [email or web address]   to learn how to:    Name someone you trust to make healthcare decisions for you, only if you can’t. (Healthcare Power of )    Document your wishes for care if you were seriously ill and not expected to recover or are approaching end of life. (Advance Directive or Living Will)    The same page can be found using the QR code below.                Starting a Weight Loss Plan: Care Instructions  Overview     It can be a challenge to lose weight. But your doctor can help you make a weight-loss plan that meets your needs.  You don't have to make a lot of big changes at once. A better idea might be to focus on small changes and stick with them. When those changes become habit, you can add a few more changes.  Some people find it helpful to take an exercise or nutrition class. If you have questions, ask your doctor about seeing a registered dietitian or an exercise specialist. You might also think about joining a weight-loss support group.  If you're not ready to make changes right now, try to pick a date in the future. Then make an appointment with your doctor to talk about when and how you'll get started with a plan.  Follow-up care is a key part of your treatment and safety. Be sure to make and go to all appointments, and call your doctor if you are having problems. It's also a good idea to know your test results and keep a list of the medicines you take.  How can you care for yourself at home?  Set realistic goals. Many people expect to lose much more weight than is likely. A weight loss of 5% to 10% of your body

## 2024-03-19 NOTE — ASSESSMENT & PLAN NOTE
Chronic, uncontrolled.  Worsening.  She has been working on making healthy dietary choices and she continues to gain weight.  Referral placed for bariatric medicine.

## 2024-03-19 NOTE — TELEPHONE ENCOUNTER
I adjusted the Rx to 40mg capsules (titrating up to take 2 per day rather than one 80mg capsule) per pharmacy's recommendation and sent a new Rx. Please notify patient.

## 2024-03-19 NOTE — ASSESSMENT & PLAN NOTE
Chronic, continue working on increasing activity level and healthy diet . Referral for surgical weight management provided.

## 2024-03-19 NOTE — PROGRESS NOTES
not in acute distress.     Appearance: She is well-developed. She is not ill-appearing or diaphoretic.   HENT:      Head: Normocephalic and atraumatic.   Cardiovascular:      Rate and Rhythm: Normal rate and regular rhythm.      Heart sounds: Normal heart sounds. No murmur heard.  Pulmonary:      Effort: Pulmonary effort is normal. No respiratory distress.      Breath sounds: Normal breath sounds. No wheezing or rhonchi.   Skin:     General: Skin is warm and dry.   Neurological:      Mental Status: She is alert and oriented to person, place, and time.   Psychiatric:         Mood and Affect: Affect normal.         Behavior: Behavior normal.       Assessment/Plan:  1. Encounter for annual physical exam  Assessment & Plan:  Annual exam today.  reviewed. Labs ordered.  Orders:  -     CBC; Future  -     Lipid Panel; Future  -     TSH; Future  -     Vitamin D 25 Hydroxy; Future  -     Vitamin B12 & Folate; Future  -     Comprehensive Metabolic Panel; Future  -     ARMANDO DIGITAL SCREEN W OR WO CAD BILATERAL; Future  -     Amb External Referral To Gastroenterology  2. Anxiety  Assessment & Plan:   Chronic, uncontrolled.  Continue Wellbutrin, fetzima, and trazodone nightly for sleep.  Continue seeing psychiatry.  Orders:  -     TSH; Future  3. Recurrent major depressive disorder, in partial remission (HCC)  Assessment & Plan:   Chronic, uncontrolled.  Continue Wellbutrin, fetzima, and trazodone nightly for sleep.  Continue seeing psychiatry.  4. Class 2 severe obesity due to excess calories with serious comorbidity and body mass index (BMI) of 37.0 to 37.9 in adult (HCC)  Assessment & Plan:  Chronic, continue working on increasing activity level and healthy diet . Referral for surgical weight management provided.  Orders:  -     Insulin Pen Needle 31G X 5 MM MISC; DAILY Starting Tue 3/19/2024, Disp-100 each, R-3, Normal  -     SYRINGE-NEEDLE, DISP, 3 ML 25G X 1\" 3 ML MISC; EVERY 30 DAYS Starting Tue 3/19/2024, Disp-12 each,

## 2024-03-20 LAB
ALBUMIN SERPL-MCNC: 4.8 G/DL (ref 3.4–5)
ALBUMIN/GLOB SERPL: 2.5 {RATIO} (ref 1.1–2.2)
ALP SERPL-CCNC: 76 U/L (ref 40–129)
ALT SERPL-CCNC: 72 U/L (ref 10–40)
ANION GAP SERPL CALCULATED.3IONS-SCNC: 10 MMOL/L (ref 3–16)
AST SERPL-CCNC: 55 U/L (ref 15–37)
BILIRUB SERPL-MCNC: 0.5 MG/DL (ref 0–1)
BUN SERPL-MCNC: 13 MG/DL (ref 7–20)
CALCIUM SERPL-MCNC: 9.4 MG/DL (ref 8.3–10.6)
CHLORIDE SERPL-SCNC: 100 MMOL/L (ref 99–110)
CHOLEST SERPL-MCNC: 236 MG/DL (ref 0–199)
CO2 SERPL-SCNC: 27 MMOL/L (ref 21–32)
CREAT SERPL-MCNC: 0.8 MG/DL (ref 0.6–1.1)
GFR SERPLBLD CREATININE-BSD FMLA CKD-EPI: >60 ML/MIN/{1.73_M2}
GLUCOSE SERPL-MCNC: 114 MG/DL (ref 70–99)
HDLC SERPL-MCNC: 45 MG/DL (ref 40–60)
LDLC SERPL CALC-MCNC: 137 MG/DL
POTASSIUM SERPL-SCNC: 4.5 MMOL/L (ref 3.5–5.1)
PROT SERPL-MCNC: 6.7 G/DL (ref 6.4–8.2)
SODIUM SERPL-SCNC: 137 MMOL/L (ref 136–145)
TRIGL SERPL-MCNC: 271 MG/DL (ref 0–150)
TSH SERPL DL<=0.005 MIU/L-ACNC: 2.63 UIU/ML (ref 0.27–4.2)
VLDLC SERPL CALC-MCNC: 54 MG/DL

## 2024-03-25 RX ORDER — LEVOMILNACIPRAN HYDROCHLORIDE 20 MG/1
20 CAPSULE, EXTENDED RELEASE ORAL DAILY
Qty: 7 CAPSULE | Refills: 0 | OUTPATIENT
Start: 2024-03-25

## 2024-04-11 ENCOUNTER — HOSPITAL ENCOUNTER (OUTPATIENT)
Dept: WOMENS IMAGING | Age: 49
Discharge: HOME | End: 2024-04-11
Payer: OTHER GOVERNMENT

## 2024-04-11 VITALS — BODY MASS INDEX: 37.36 KG/M2 | WEIGHT: 203 LBS | HEIGHT: 62 IN

## 2024-04-11 DIAGNOSIS — Z12.31 ENCOUNTER FOR SCREENING MAMMOGRAM FOR MALIGNANT NEOPLASM OF BREAST: ICD-10-CM

## 2024-04-11 DIAGNOSIS — Z00.00 ENCOUNTER FOR ANNUAL PHYSICAL EXAM: ICD-10-CM

## 2024-04-11 PROCEDURE — 77063 BREAST TOMOSYNTHESIS BI: CPT

## 2024-04-18 PROBLEM — Z12.11 COLON CANCER SCREENING: Status: RESOLVED | Noted: 2024-03-19 | Resolved: 2024-04-18

## 2024-04-18 PROBLEM — Z00.00 ENCOUNTER FOR WELL ADULT EXAM WITHOUT ABNORMAL FINDINGS: Status: RESOLVED | Noted: 2024-03-19 | Resolved: 2024-04-18

## 2024-04-18 PROBLEM — Z00.00 ENCOUNTER FOR ANNUAL PHYSICAL EXAM: Status: RESOLVED | Noted: 2024-03-19 | Resolved: 2024-04-18

## 2024-04-22 ENCOUNTER — TELEPHONE (OUTPATIENT)
Dept: BARIATRICS/WEIGHT MGMT | Age: 49
End: 2024-04-22

## 2024-04-22 NOTE — TELEPHONE ENCOUNTER
Patient was sent Dr. Jung's digital bariatric seminar.     Patient DOES have BWLS coverage with [employer] (No req diet - PCP referral required) Bariatric benefit form scanned in media.    *Spoke with patient, Info given  Per pt no H/O WLS. BMI > 35  Pk mailed

## 2024-05-15 RX ORDER — LEVOMILNACIPRAN HYDROCHLORIDE 80 MG/1
80 CAPSULE, EXTENDED RELEASE ORAL DAILY
Qty: 30 CAPSULE | Refills: 1 | Status: SHIPPED | OUTPATIENT
Start: 2024-05-15

## 2024-05-17 ENCOUNTER — OFFICE VISIT (OUTPATIENT)
Dept: BARIATRICS/WEIGHT MGMT | Age: 49
End: 2024-05-17
Payer: OTHER GOVERNMENT

## 2024-05-17 VITALS
HEART RATE: 82 BPM | BODY MASS INDEX: 37.1 KG/M2 | DIASTOLIC BLOOD PRESSURE: 74 MMHG | HEIGHT: 62 IN | SYSTOLIC BLOOD PRESSURE: 136 MMHG | WEIGHT: 201.6 LBS | OXYGEN SATURATION: 98 % | RESPIRATION RATE: 18 BRPM

## 2024-05-17 DIAGNOSIS — I10 ESSENTIAL HYPERTENSION: ICD-10-CM

## 2024-05-17 DIAGNOSIS — K21.9 CHRONIC GERD: ICD-10-CM

## 2024-05-17 DIAGNOSIS — E78.2 MIXED HYPERLIPIDEMIA: ICD-10-CM

## 2024-05-17 DIAGNOSIS — R73.03 PREDIABETES: ICD-10-CM

## 2024-05-17 DIAGNOSIS — E53.8 VITAMIN B12 DEFICIENCY: ICD-10-CM

## 2024-05-17 DIAGNOSIS — E66.01 SEVERE OBESITY (BMI 35.0-39.9) WITH COMORBIDITY (HCC): Primary | ICD-10-CM

## 2024-05-17 PROCEDURE — 1036F TOBACCO NON-USER: CPT | Performed by: SURGERY

## 2024-05-17 PROCEDURE — 3078F DIAST BP <80 MM HG: CPT | Performed by: SURGERY

## 2024-05-17 PROCEDURE — 3075F SYST BP GE 130 - 139MM HG: CPT | Performed by: SURGERY

## 2024-05-17 PROCEDURE — 99205 OFFICE O/P NEW HI 60 MIN: CPT | Performed by: SURGERY

## 2024-05-17 PROCEDURE — G8417 CALC BMI ABV UP PARAM F/U: HCPCS | Performed by: SURGERY

## 2024-05-17 PROCEDURE — G8427 DOCREV CUR MEDS BY ELIG CLIN: HCPCS | Performed by: SURGERY

## 2024-05-17 NOTE — PROGRESS NOTES
Leslie Davis is a 48 y.o. female with a date of birth of 1975.    Vitals:    05/17/24 0923   BP: 136/74   Pulse: 82   Resp: 18   SpO2: 98%   BMI: Body mass index is 37.48 kg/m². Obesity Classification: Class II    Weight History:   Wt Readings from Last 3 Encounters:   05/17/24 91.4 kg (201 lb 9.6 oz)   04/11/24 92.1 kg (203 lb)   03/19/24 92.1 kg (203 lb)     Patient's lowest adult weight was 122 lbs at age 36.     Patient's highest adult weight was 203 lbs at age 48.     Patient has participated in the following weight loss programs: Calorie Restriction, Fasting, Keto, Low Carb Diet.   Patient has not participated in meal replacement/liquid diets.  Patient has not participated in weight loss medications.    Patient is not lactose intolerant.  Patient does not have Taoism/cultural food concerns. Patient does not have food allergies. Patient does tolerate artificial sweeteners.     24 hour recall/food frequency chart:  Breakfast: yes. tea & banana  Snack: no  Lunch: yes. tomato basil soup & banana  Snack: no.   Dinner: yes. pork chop & 2 small red potatoes & green beans  Snack: yes. 2 glasses wine (most nights) & sometimes dinner (if she had a heavier lunch)  Drinks throughout the day: water, black tea w/ 2-3 tsp sugar   Do you drink alcohol? Yes. How often/how much alcohol do you drink: 2 Beers or 2 Glasses of wine 4-5 nights a week.    Patient does not meet the criteria for binge eating disorder. Patient does have grazing. Patient does not have night eating. Patient does not have a history of emotional eating or eating out of boredom.    Surgery  Patient does feel confident in her ability to make these changes.  The patient's expectations of post-surgical eating habits - voices understanding.    Patient states she does understand the consequences of not complying with post-op food guidelines.  Patient states she does understands the long term changes in food intake that will be necessary for all 
Hydroxy; Future  -     Vitamin E; Future  -     Protime-INR; Future  -     Ambulatory referral to Cardiology    Mixed hyperlipidemia  -     CBC with Auto Differential; Future  -     Comprehensive Metabolic Panel; Future  -     Hemoglobin A1C; Future  -     Iron and TIBC; Future  -     Lipid Panel; Future  -     TSH with Reflex; Future  -     Vitamin A; Future  -     Vitamin B1, Whole Blood; Future  -     Vitamin B12 & Folate; Future  -     Vitamin D 25 Hydroxy; Future  -     Vitamin E; Future  -     Protime-INR; Future  -     Ambulatory referral to Cardiology    Chronic GERD  -     CBC with Auto Differential; Future  -     Comprehensive Metabolic Panel; Future  -     Hemoglobin A1C; Future  -     Iron and TIBC; Future  -     Lipid Panel; Future  -     TSH with Reflex; Future  -     Vitamin A; Future  -     Vitamin B1, Whole Blood; Future  -     Vitamin B12 & Folate; Future  -     Vitamin D 25 Hydroxy; Future  -     Vitamin E; Future  -     Protime-INR; Future  -     Ambulatory referral to Cardiology          A/P  Leslie Davis is a very pleasant 48 y.o. female with Obesity,  Body mass index is 37.48 kg/m².  and multiple obesity related co-morbidities. Leslie Davis is very motivated to lose weight and being more healthy.     We discussed how her weight affects her overall health including:  Patient Active Problem List    Diagnosis Date Noted    Prediabetes 05/17/2024    Mixed hyperlipidemia 05/17/2024    Chronic GERD 05/17/2024    Vitamin B12 deficiency 03/19/2024    Weight gain 03/19/2024    ACP (advance care planning) 03/19/2024    Fatigue     Preoperative clearance 10/09/2023    S/P bunionectomy 01/13/2020    Anxiety 10/16/2019    Severe obesity (BMI 35.0-39.9) with comorbidity (HCC) 10/16/2019    Recurrent major depressive disorder, in partial remission (HCC) 06/05/2019    Injury of triangular fibrocartilage complex of left wrist 11/27/2018    Left wrist tendonitis 11/27/2018    Hx of multiple trauma

## 2024-05-17 NOTE — PATIENT INSTRUCTIONS
Patient received dietary handouts and education.        -Plan for Laparoscopic sleeve gastrectomy      Pre-operative work up Ordered:    - F/U in 4 weeks.   - Nutrition Labs.   - Protein Shake Trial.  - Psych Evaluation.   - Cardiac Clearance.  - EGD (endoscopy to check your stomach).  - Support Group Attendance.   - Obtain letter of medical necessity (PCP Letter).   - Quit Smoking,  Alcohol, Caffeine and Carbonated Drinks  - Obtain records for Weight History 2 yrs.   - Start Regular Exercise and track your activities.   - Start Tracking your food Intake and follow dietary guidelines.   - Avoid Pregnancy for 2 yrs from date of surgery. (for female patients in childbearing age)        Patient advised that its their responsibility to follow up for studies, referrals and/or labs ordered today.  s

## 2024-05-21 ENCOUNTER — TELEPHONE (OUTPATIENT)
Dept: CARDIOLOGY CLINIC | Age: 49
End: 2024-05-21

## 2024-05-21 NOTE — TELEPHONE ENCOUNTER
Patient was referred by Dr. Joe Jung to the [facility] location with Dr. Jolley.  Patient has been scheduled for 06/12/24 at 11:00 (am/pm).  Patient verbalized understanding of appointment instructions.  Call complete.

## 2024-05-29 ENCOUNTER — PREP FOR PROCEDURE (OUTPATIENT)
Dept: BARIATRICS/WEIGHT MGMT | Age: 49
End: 2024-05-29

## 2024-05-29 RX ORDER — SODIUM CHLORIDE 9 MG/ML
25 INJECTION, SOLUTION INTRAVENOUS PRN
Status: CANCELLED | OUTPATIENT
Start: 2024-05-29

## 2024-05-29 RX ORDER — SODIUM CHLORIDE 0.9 % (FLUSH) 0.9 %
5-40 SYRINGE (ML) INJECTION EVERY 12 HOURS SCHEDULED
Status: CANCELLED | OUTPATIENT
Start: 2024-05-29

## 2024-05-29 RX ORDER — SODIUM CHLORIDE 0.9 % (FLUSH) 0.9 %
5-40 SYRINGE (ML) INJECTION PRN
Status: CANCELLED | OUTPATIENT
Start: 2024-05-29

## 2024-05-31 RX ORDER — METHOCARBAMOL 500 MG/1
500 TABLET, FILM COATED ORAL 4 TIMES DAILY
COMMUNITY

## 2024-05-31 NOTE — PROGRESS NOTES
Patient reached ___x_ yes  _____ no   VM instructions left ____ yes   phone number ________                                ____ no-office notified          Date __6/7_______  Time ___0830____  Arrival ___/per office___0630    Nothing to eat or drink after midnight-follow your doctors prep instructions-this may include taking a second dose of your prep after midnight  Responsible adult 18 or older to stay on site while you are here-drive you home-stay with you after  Follow any instructions your doctors office has given you  Bring a complete list of all your medications and supplements including name,dose,how often taken the day of your procedure  If you normally take the following medications in the morning please do so the AM of your procedure with a small sip of water       Heart,blood pressure,seizure,thyroid or breathing medications-use your inhalers-bring any rescue inhalers with you DOS       DO NOT take blood pressure medications ending in \"elenita\" or \"pril\" the AM of procedure or evening prior  Dr Graves patients are not to take any medications the AM of surgery  Take half or your normal dose of any long acting insulins the night before your procedure-do not take any diabetic medications the AM of procedure  Follow your doctors instructions regarding stopping or taking  any blood thinners-if you do not have instructions-call them  Any questions call your doctor  Other ______________________________________________________________                VISITOR POLICY(subject to change)             The current policy is 2 visitors per patient.There are no children allowed.Mask at discretion of facility. Visiting hours are 8a-8p.Overnight visitors will be at the discretion of the nurse. All policies are subject to change.

## 2024-05-31 NOTE — PROGRESS NOTES
[facility]  Cardiology Note  [phone]      Chief Complaint   Patient presents with    Hypertension     No cardiac symptoms at this time.    Hyperlipidemia    New Patient        History of Present Illness:  Leslie Davis is a 48 y.o. patient PMHx HTN, fatigue, depression anxiety, obesity. Pt referred to the office for cardiac risk assessment by Dr. Jung for upcoming bariatric surgery. I have been asked to provide consultation regarding further management and testing.    Pt is here today for cardiac clearance. She has lost 11 lbs in the last 2 weeks by following weight watchers. Stopped drinking wine. Walks 1-2 miles, denies chest pain/SOB. Son was diagnosed with EDS- hypermobility through [facility] at a age 4. She states she was diagnosed at the same time, no formal testing completed.     Family hx of HTN and stroke    Past Medical History:   has a past medical history of Anxiety, Chronic pain, Closed fracture of left distal radius, Depression, EDS (Terri-Danlos syndrome), Hypertension, and Insomnia.    Surgical History:   has a past surgical history that includes  section; back surgery; Bunionectomy (Right, 2019); Foot surgery (Right, 2022); Carpal tunnel release (Left); Upper gastrointestinal endoscopy (N/A, 2024); and Cholecystectomy (10/25/2023).     Social History:   reports that she has quit smoking. Her smoking use included cigarettes. She has never used smokeless tobacco. She reports current alcohol use of about 6.0 standard drinks of alcohol per week. She reports that she does not use drugs.     Family History:  Family History   Problem Relation Age of Onset    Mental Illness Mother     Arthritis Mother     Elevated Lipids Mother     Hypertension Mother     High Blood Pressure Mother     Migraines Mother     Alcohol Abuse Father     Mental Illness Father     Arthritis Father     Breast Cancer Neg Hx     Ovarian Cancer Neg Hx        Home

## 2024-06-03 ENCOUNTER — TELEPHONE (OUTPATIENT)
Dept: BARIATRICS/WEIGHT MGMT | Age: 49
End: 2024-06-03

## 2024-06-06 ENCOUNTER — TELEPHONE (OUTPATIENT)
Dept: INTERNAL MEDICINE CLINIC | Age: 49
End: 2024-06-06

## 2024-06-06 NOTE — TELEPHONE ENCOUNTER
Pt calling requesting refill of buPROPion (WELLBUTRIN XL) 300 MG extended release tablet     Last written 2/16/24  Last OV 3/14/24  Next OV 6/19/24    Please send to [address]     Pt states she has about 6 days left of meds. If not able to send full refill pt asks if short supply can be sent to get her to appt on 6/19.

## 2024-06-07 ENCOUNTER — ANESTHESIA EVENT (OUTPATIENT)
Dept: ENDOSCOPY | Age: 49
End: 2024-06-07
Payer: MEDICAID

## 2024-06-07 ENCOUNTER — ANESTHESIA (OUTPATIENT)
Dept: ENDOSCOPY | Age: 49
End: 2024-06-07
Payer: MEDICAID

## 2024-06-07 ENCOUNTER — HOSPITAL ENCOUNTER (OUTPATIENT)
Age: 49
Setting detail: OUTPATIENT SURGERY
Discharge: HOME | End: 2024-06-07
Attending: SURGERY | Admitting: SURGERY
Payer: MEDICAID

## 2024-06-07 VITALS
HEART RATE: 82 BPM | SYSTOLIC BLOOD PRESSURE: 117 MMHG | RESPIRATION RATE: 12 BRPM | TEMPERATURE: 97.5 F | WEIGHT: 194 LBS | DIASTOLIC BLOOD PRESSURE: 83 MMHG | BODY MASS INDEX: 35.7 KG/M2 | OXYGEN SATURATION: 97 % | HEIGHT: 62 IN

## 2024-06-07 DIAGNOSIS — K21.9 CHRONIC GERD: ICD-10-CM

## 2024-06-07 LAB
GLUCOSE BLD-MCNC: 106 MG/DL (ref 70–99)
HCG UR QL: NEGATIVE
PERFORMED ON: ABNORMAL

## 2024-06-07 PROCEDURE — 43239 EGD BIOPSY SINGLE/MULTIPLE: CPT | Performed by: SURGERY

## 2024-06-07 PROCEDURE — 6360000002 HC RX W HCPCS: Performed by: NURSE ANESTHETIST, CERTIFIED REGISTERED

## 2024-06-07 PROCEDURE — 3700000000 HC ANESTHESIA ATTENDED CARE: Performed by: SURGERY

## 2024-06-07 PROCEDURE — 84703 CHORIONIC GONADOTROPIN ASSAY: CPT

## 2024-06-07 PROCEDURE — 2709999900 HC NON-CHARGEABLE SUPPLY: Performed by: SURGERY

## 2024-06-07 PROCEDURE — 2580000003 HC RX 258: Performed by: SURGERY

## 2024-06-07 PROCEDURE — 2500000003 HC RX 250 WO HCPCS: Performed by: NURSE ANESTHETIST, CERTIFIED REGISTERED

## 2024-06-07 PROCEDURE — 7100000010 HC PHASE II RECOVERY - FIRST 15 MIN: Performed by: SURGERY

## 2024-06-07 PROCEDURE — 7100000011 HC PHASE II RECOVERY - ADDTL 15 MIN: Performed by: SURGERY

## 2024-06-07 PROCEDURE — 3609012400 HC EGD TRANSORAL BIOPSY SINGLE/MULTIPLE: Performed by: SURGERY

## 2024-06-07 PROCEDURE — 88305 TISSUE EXAM BY PATHOLOGIST: CPT

## 2024-06-07 RX ORDER — SODIUM CHLORIDE 9 MG/ML
25 INJECTION, SOLUTION INTRAVENOUS PRN
Status: DISCONTINUED | OUTPATIENT
Start: 2024-06-07 | End: 2024-06-07 | Stop reason: HOSPADM

## 2024-06-07 RX ORDER — PROPOFOL 10 MG/ML
INJECTION, EMULSION INTRAVENOUS CONTINUOUS PRN
Status: DISCONTINUED | OUTPATIENT
Start: 2024-06-07 | End: 2024-06-07 | Stop reason: SDUPTHER

## 2024-06-07 RX ORDER — SODIUM CHLORIDE 0.9 % (FLUSH) 0.9 %
5-40 SYRINGE (ML) INJECTION PRN
Status: DISCONTINUED | OUTPATIENT
Start: 2024-06-07 | End: 2024-06-07 | Stop reason: HOSPADM

## 2024-06-07 RX ORDER — DEXMEDETOMIDINE HYDROCHLORIDE 100 UG/ML
INJECTION, SOLUTION INTRAVENOUS PRN
Status: DISCONTINUED | OUTPATIENT
Start: 2024-06-07 | End: 2024-06-07 | Stop reason: SDUPTHER

## 2024-06-07 RX ORDER — LIDOCAINE HYDROCHLORIDE 20 MG/ML
INJECTION, SOLUTION EPIDURAL; INFILTRATION; INTRACAUDAL; PERINEURAL PRN
Status: DISCONTINUED | OUTPATIENT
Start: 2024-06-07 | End: 2024-06-07 | Stop reason: SDUPTHER

## 2024-06-07 RX ORDER — OMEPRAZOLE 20 MG/1
20 CAPSULE, DELAYED RELEASE ORAL
Qty: 90 CAPSULE | Refills: 1 | Status: SHIPPED | OUTPATIENT
Start: 2024-06-07

## 2024-06-07 RX ORDER — SODIUM CHLORIDE 0.9 % (FLUSH) 0.9 %
5-40 SYRINGE (ML) INJECTION EVERY 12 HOURS SCHEDULED
Status: DISCONTINUED | OUTPATIENT
Start: 2024-06-07 | End: 2024-06-07 | Stop reason: HOSPADM

## 2024-06-07 RX ADMIN — PROPOFOL 50 MG: 10 INJECTION, EMULSION INTRAVENOUS at 08:42

## 2024-06-07 RX ADMIN — SODIUM CHLORIDE 1000 ML: 9 INJECTION, SOLUTION INTRAVENOUS at 07:32

## 2024-06-07 RX ADMIN — LIDOCAINE HYDROCHLORIDE 60 MG: 20 INJECTION, SOLUTION EPIDURAL; INFILTRATION; INTRACAUDAL; PERINEURAL at 08:38

## 2024-06-07 RX ADMIN — DEXMEDETOMIDINE HYDROCHLORIDE 20 MCG: 100 INJECTION, SOLUTION INTRAVENOUS at 08:37

## 2024-06-07 RX ADMIN — PROPOFOL 50 MG: 10 INJECTION, EMULSION INTRAVENOUS at 08:38

## 2024-06-07 RX ADMIN — PROPOFOL 100 MCG/KG/MIN: 10 INJECTION, EMULSION INTRAVENOUS at 08:37

## 2024-06-07 RX ADMIN — PROPOFOL 50 MG: 10 INJECTION, EMULSION INTRAVENOUS at 08:40

## 2024-06-07 ASSESSMENT — PAIN - FUNCTIONAL ASSESSMENT: PAIN_FUNCTIONAL_ASSESSMENT: NONE - DENIES PAIN

## 2024-06-07 NOTE — ANESTHESIA POSTPROCEDURE EVALUATION
Department of Anesthesiology  Postprocedure Note    Patient: Leslie Davis  MRN: [id number]  YOB: 1975  Date of evaluation: 6/7/2024    Procedure Summary       Date: 06/07/24 Room / Location: [place] [address] / [facility]    Anesthesia Start: 0836 Anesthesia Stop: 0848    Procedure: ESOPHAGOGASTRODUODENOSCOPY BIOPSY (Abdomen) Diagnosis:       Chronic GERD      (Chronic GERD [K21.9])    Surgeons: Joe Jung MD Responsible Provider: JEFERSON Love MD    Anesthesia Type: MAC ASA Status: 2            Anesthesia Type: No value filed.    Kelsey Phase I: Kelsey Score: 10    Kelsey Phase II: Kelsey Score: 10    Anesthesia Post Evaluation    Patient location during evaluation: PACU  Patient participation: complete - patient participated  Level of consciousness: awake and alert  Pain score: 0  Airway patency: patent  Nausea & Vomiting: no nausea  Cardiovascular status: blood pressure returned to baseline  Respiratory status: acceptable  Hydration status: euvolemic  Pain management: adequate    No notable events documented.   None

## 2024-06-07 NOTE — DISCHARGE INSTRUCTIONS
ENDOSCOPY DISCHARGE INSTRUCTIONS    You may experience some lightheadedness for the next several hours.  Plan on quiet relaxation for the rest of today.  A responsible adult needs to stay with you today.  Because of the medications you received today-do not drive,operate machinery,or sign any contractual agreement for the next 24 hours.  Do not drink any alcoholic beverages or take any unprescribed medications tonight.  Eat bland food and avoid anything greasy or spicy initially-progress to your normal diet gradually.  Diet restrictions as instructed.  If you have any of the following problems, notify your physician or return to the hospital emergency room : fever, chills, excessive bleeding, excessive vomiting, difficulty swallowing, uncontrolled pain, increased abdominal distention, shortness of breath or any other problems.  If you have a sore throat, you may use lozenges or salt water gargles.  Please call Dr. Jung's office in 5 business days for biopsy results [phone].      ANESTHESIA DISCHARGE INSTRUCTIONS    Wear your seatbelt home.  You are under the influence of drugs-do not drink alcohol,drive,operate machinery,or make any important decisions or sign any legal documentsfor 24 hours  Children should not ride bikes,skate boards or play on gym sets  for 24 hours after surgery.  A responsible adult needs to be with you for 24 hours.  You may experience lightheadedness,dizziness,or sleepiness following surgery.  Rest at home today- increase activity as tolerated.  Progress slowly to a regular diet unless your physician has instructed you otherwise.Drink plenty of water.  If nausea becomes a problem call your physician.  Coughing,sore throat,and muscle aches are other side effects of anesthesia,and should improve with time.  Do not drive,operate machinery while taking narcotics.

## 2024-06-07 NOTE — PROGRESS NOTES
Discharge instructions review with patient. All home medications have been reviewed, pt v/u. Pt discharged via wheelchair. Pt discharged with all belongings. S/O Fady taking stable pt home.

## 2024-06-07 NOTE — ANESTHESIA PRE PROCEDURE
mL  5-40 mL IntraVENous 2 times per day Joe Jung MD       • sodium chloride flush 0.9 % injection 5-40 mL  5-40 mL IntraVENous PRN Joe Jung MD       • 0.9 % sodium chloride infusion  25 mL IntraVENous PRN Joe Jung MD 25 mL/hr at 24 0732 1,000 mL at 24 0732       Allergies:  No Known Allergies    Problem List:    Patient Active Problem List   Diagnosis Code   • Essential hypertension I10   • Hx of multiple trauma Z87.828   • Injury of triangular fibrocartilage complex of left wrist S69.82XA   • Left wrist tendonitis M77.8   • Recurrent major depressive disorder, in partial remission ([facility]) F33.41   • Anxiety F41.9   • S/P bunionectomy Z98.890   • Preoperative clearance Z01.818   • Severe obesity (BMI 35.0-39.9) with comorbidity ([facility]) E66.01   • Fatigue R53.83   • Vitamin B12 deficiency E53.8   • Weight gain R63.5   • ACP (advance care planning) Z71.89   • Prediabetes R73.03   • Mixed hyperlipidemia E78.2   • Chronic GERD K21.9       Past Medical History:        Diagnosis Date   • Anxiety    • Chronic pain     back   • Closed fracture of left distal radius 2018   • Depression    • EDS (Terri-Danlos syndrome)    • Hypertension    • Insomnia        Past Surgical History:        Procedure Laterality Date   • BACK SURGERY      RODS AND SCREWS IN PLACE   • BUNIONECTOMY Right 2019    LAPIDUS BUNIONECTOMY RIGHT FOOT WITH BRANDON OSTEOTOMY - MONI performed by Robby Nicole DPM at [facility] OR   • CARPAL TUNNEL RELEASE Left    •  SECTION     • FOOT SURGERY Right 2022    REMOVAL OF PAINFUL HARDWARE RIGHT FOOT-MONI performed by Robby Nicole DPM at [facility] OR       Social History:    Social History     Tobacco Use   • Smoking status: Former     Types: Cigarettes   • Smokeless tobacco: Never   Substance Use Topics   • Alcohol use: Yes     Alcohol/week: 6.0 standard drinks of alcohol     Types: 6 Glasses of wine per week     Comment: Occasionally

## 2024-06-07 NOTE — PROGRESS NOTES
Pt arrived from ENDO to PACU bay 8. Report received from ENDO staff. Pt arousable to voice.  Pt on RA, NSR, and VSS. Will continue to monitor.

## 2024-06-07 NOTE — H&P
Department of General Surgery - Adult Surgical Service   [facility]   [employer]  Attending Pre-operative History and Physical      DIAGNOSIS:  Obesity    INDICATION:  Pre-op    PROCEDURE:  EGD    CHIEF COMPLAINT:  Obesity    History Obtained From:  patient    HISTORY OF PRESENT ILLNESS:    The patient is a 48 y.o. female with significant past medical history of   Patient Active Problem List   Diagnosis    Essential hypertension    Hx of multiple trauma    Injury of triangular fibrocartilage complex of left wrist    Left wrist tendonitis    Recurrent major depressive disorder, in partial remission (HCC)    Anxiety    S/P bunionectomy    Preoperative clearance    Severe obesity (BMI 35.0-39.9) with comorbidity (HCC)    Fatigue    Vitamin B12 deficiency    Weight gain    ACP (advance care planning)    Prediabetes    Mixed hyperlipidemia    Chronic GERD      who presents for pre-op EGD    Past Medical History:        Diagnosis Date    Anxiety     Chronic pain     back    Closed fracture of left distal radius 2018    Depression     EDS (Terri-Danlos syndrome)     Hypertension     Insomnia      Past Surgical History:        Procedure Laterality Date    BACK SURGERY      RODS AND SCREWS IN PLACE    BUNIONECTOMY Right 2019    LAPIDUS BUNIONECTOMY RIGHT FOOT WITH BRANDON OSTEOTOMY - MONI performed by Robby Nicole DPM at [facility] OR    CARPAL TUNNEL RELEASE Left      SECTION      FOOT SURGERY Right 2022    REMOVAL OF PAINFUL HARDWARE RIGHT FOOT-MONI performed by Robby Nicole DPM at [facility] OR     Medications Prior to Admission:   Medications Prior to Admission: Turmeric (QC TUMERIC COMPLEX PO), Take by mouth Gummie  methocarbamol (ROBAXIN) 500 MG tablet, Take 1 tablet by mouth 4 times daily  levomilnacipran (FETZIMA) 80 MG CP24 extended release capsule, TAKE 1 CAPSULE BY MOUTH DAILY  cyanocobalamin 1000 MCG/ML injection, INJECT ONE MILLILITER      Physical Exam   Vitals Reviewed   Constitutional: Patient is oriented to person, place, and time. Patient appears well-developed and well-nourished. Patient is active and cooperative.  Non-toxic appearance. No distress.   HENT:   Head: Normocephalic and atraumatic. Head is without abrasion and without laceration. Hair is normal.   Right Ear: External ear normal. No lacerations. No drainage, swelling .   Left Ear: External ear normal. No lacerations. No drainage, swelling.   Nose: Nose normal. No nose lacerations or nasal deformity.   Eyes: Conjunctivae, EOM and lids are normal. Right eye exhibits no discharge. No foreign body present in the right eye. Left eye exhibits no discharge. No foreign body present in the left eye. No scleral icterus.   Neck: Trachea normal and normal range of motion. No JVD present.   Pulmonary/Chest: Effort normal. No accessory muscle usage or stridor. No apnea. No respiratory distress.   Cardiovascular: Normal rate and no JVD.   Abdominal: Normal appearance. Patient exhibits no distension.   Musculoskeletal: Normal range of motion. Patient exhibits no edema.   Neurological: Patient is alert and oriented to person, place, and time. Patient has normal strength. GCS eye subscore is 4. GCS verbal subscore is 5. GCS motor subscore is 6.   Skin: Skin is warm and dry. No abrasion and no rash noted. Patient is not diaphoretic. No cyanosis or erythema.   Psychiatric: Patient has a normal mood and affect. Speech is normal and behavior is normal. Cognition and memory are normal.       DATA:  CBC:   Lab Results   Component Value Date/Time    WBC 4.6 03/19/2024 12:13 PM    RBC 4.23 03/19/2024 12:13 PM    HGB 13.8 03/19/2024 12:13 PM    HCT 40.2 03/19/2024 12:13 PM    MCV 95.0 03/19/2024 12:13 PM    MCH 32.6 03/19/2024 12:13 PM    MCHC 34.4 03/19/2024 12:13 PM    RDW 12.4 03/19/2024 12:13 PM     03/19/2024 12:13 PM    MPV 9.7 03/19/2024 12:13 PM     CMP:    Lab Results   Component Value

## 2024-06-10 RX ORDER — BUPROPION HYDROCHLORIDE 300 MG/1
300 TABLET ORAL EVERY MORNING
Qty: 90 TABLET | Refills: 0 | Status: SHIPPED | OUTPATIENT
Start: 2024-06-10

## 2024-06-12 ENCOUNTER — OFFICE VISIT (OUTPATIENT)
Dept: CARDIOLOGY CLINIC | Age: 49
End: 2024-06-12
Payer: OTHER GOVERNMENT

## 2024-06-12 VITALS
OXYGEN SATURATION: 96 % | BODY MASS INDEX: 36.82 KG/M2 | HEART RATE: 95 BPM | SYSTOLIC BLOOD PRESSURE: 130 MMHG | WEIGHT: 195 LBS | DIASTOLIC BLOOD PRESSURE: 96 MMHG | HEIGHT: 61 IN

## 2024-06-12 DIAGNOSIS — Z01.818 PREOPERATIVE CLEARANCE: Primary | ICD-10-CM

## 2024-06-12 DIAGNOSIS — I10 PRIMARY HYPERTENSION: ICD-10-CM

## 2024-06-12 PROCEDURE — 93000 ELECTROCARDIOGRAM COMPLETE: CPT | Performed by: INTERNAL MEDICINE

## 2024-06-12 PROCEDURE — 99204 OFFICE O/P NEW MOD 45 MIN: CPT | Performed by: INTERNAL MEDICINE

## 2024-06-12 PROCEDURE — 3080F DIAST BP >= 90 MM HG: CPT | Performed by: INTERNAL MEDICINE

## 2024-06-12 PROCEDURE — 3075F SYST BP GE 130 - 139MM HG: CPT | Performed by: INTERNAL MEDICINE

## 2024-06-12 NOTE — PATIENT INSTRUCTIONS
- Have PCP refer you to connective MD at [facility] for Terri Danlos Syndrome    - Cardiac clearance will be sent to Dr. Jung electronically    - Follow up with PCP regularly

## 2024-06-16 PROBLEM — Z01.818 PREOPERATIVE CLEARANCE: Status: RESOLVED | Noted: 2023-10-09 | Resolved: 2024-06-16

## 2024-06-19 ENCOUNTER — OFFICE VISIT (OUTPATIENT)
Dept: PSYCHIATRY | Age: 49
End: 2024-06-19
Payer: MEDICAID

## 2024-06-19 VITALS
DIASTOLIC BLOOD PRESSURE: 68 MMHG | HEART RATE: 68 BPM | OXYGEN SATURATION: 97 % | SYSTOLIC BLOOD PRESSURE: 112 MMHG | RESPIRATION RATE: 16 BRPM | HEIGHT: 61 IN | WEIGHT: 189 LBS | BODY MASS INDEX: 35.68 KG/M2

## 2024-06-19 DIAGNOSIS — F33.41 RECURRENT MAJOR DEPRESSIVE DISORDER, IN PARTIAL REMISSION (HCC): Primary | ICD-10-CM

## 2024-06-19 DIAGNOSIS — M79.7 FIBROMYALGIA: ICD-10-CM

## 2024-06-19 PROCEDURE — 3078F DIAST BP <80 MM HG: CPT | Performed by: PSYCHIATRY & NEUROLOGY

## 2024-06-19 PROCEDURE — 1036F TOBACCO NON-USER: CPT | Performed by: PSYCHIATRY & NEUROLOGY

## 2024-06-19 PROCEDURE — G8417 CALC BMI ABV UP PARAM F/U: HCPCS | Performed by: PSYCHIATRY & NEUROLOGY

## 2024-06-19 PROCEDURE — 3074F SYST BP LT 130 MM HG: CPT | Performed by: PSYCHIATRY & NEUROLOGY

## 2024-06-19 PROCEDURE — 99214 OFFICE O/P EST MOD 30 MIN: CPT | Performed by: PSYCHIATRY & NEUROLOGY

## 2024-06-19 PROCEDURE — G8427 DOCREV CUR MEDS BY ELIG CLIN: HCPCS | Performed by: PSYCHIATRY & NEUROLOGY

## 2024-06-19 RX ORDER — BUPROPION HYDROCHLORIDE 300 MG/1
300 TABLET ORAL EVERY MORNING
Qty: 90 TABLET | Refills: 1 | Status: SHIPPED | OUTPATIENT
Start: 2024-06-19

## 2024-06-19 ASSESSMENT — PATIENT HEALTH QUESTIONNAIRE - PHQ9
SUM OF ALL RESPONSES TO PHQ QUESTIONS 1-9: 8
3. TROUBLE FALLING OR STAYING ASLEEP: SEVERAL DAYS
4. FEELING TIRED OR HAVING LITTLE ENERGY: SEVERAL DAYS
SUM OF ALL RESPONSES TO PHQ QUESTIONS 1-9: 8
8. MOVING OR SPEAKING SO SLOWLY THAT OTHER PEOPLE COULD HAVE NOTICED. OR THE OPPOSITE, BEING SO FIGETY OR RESTLESS THAT YOU HAVE BEEN MOVING AROUND A LOT MORE THAN USUAL: NOT AT ALL
10. IF YOU CHECKED OFF ANY PROBLEMS, HOW DIFFICULT HAVE THESE PROBLEMS MADE IT FOR YOU TO DO YOUR WORK, TAKE CARE OF THINGS AT HOME, OR GET ALONG WITH OTHER PEOPLE: SOMEWHAT DIFFICULT
1. LITTLE INTEREST OR PLEASURE IN DOING THINGS: SEVERAL DAYS
9. THOUGHTS THAT YOU WOULD BE BETTER OFF DEAD, OR OF HURTING YOURSELF: NOT AT ALL
7. TROUBLE CONCENTRATING ON THINGS, SUCH AS READING THE NEWSPAPER OR WATCHING TELEVISION: MORE THAN HALF THE DAYS
5. POOR APPETITE OR OVEREATING: SEVERAL DAYS
SUM OF ALL RESPONSES TO PHQ QUESTIONS 1-9: 8
SUM OF ALL RESPONSES TO PHQ QUESTIONS 1-9: 8
SUM OF ALL RESPONSES TO PHQ9 QUESTIONS 1 & 2: 2
2. FEELING DOWN, DEPRESSED OR HOPELESS: SEVERAL DAYS
6. FEELING BAD ABOUT YOURSELF - OR THAT YOU ARE A FAILURE OR HAVE LET YOURSELF OR YOUR FAMILY DOWN: SEVERAL DAYS

## 2024-06-19 ASSESSMENT — ANXIETY QUESTIONNAIRES
1. FEELING NERVOUS, ANXIOUS, OR ON EDGE: SEVERAL DAYS
5. BEING SO RESTLESS THAT IT IS HARD TO SIT STILL: NOT AT ALL
6. BECOMING EASILY ANNOYED OR IRRITABLE: SEVERAL DAYS
3. WORRYING TOO MUCH ABOUT DIFFERENT THINGS: SEVERAL DAYS
4. TROUBLE RELAXING: SEVERAL DAYS
GAD7 TOTAL SCORE: 4
7. FEELING AFRAID AS IF SOMETHING AWFUL MIGHT HAPPEN: NOT AT ALL
2. NOT BEING ABLE TO STOP OR CONTROL WORRYING: NOT AT ALL

## 2024-06-28 ENCOUNTER — OFFICE VISIT (OUTPATIENT)
Dept: BARIATRICS/WEIGHT MGMT | Age: 49
End: 2024-06-28
Payer: OTHER GOVERNMENT

## 2024-06-28 VITALS
RESPIRATION RATE: 18 BRPM | BODY MASS INDEX: 34.82 KG/M2 | OXYGEN SATURATION: 98 % | HEIGHT: 62 IN | SYSTOLIC BLOOD PRESSURE: 110 MMHG | HEART RATE: 72 BPM | WEIGHT: 189.2 LBS | DIASTOLIC BLOOD PRESSURE: 62 MMHG

## 2024-06-28 DIAGNOSIS — E78.2 MIXED HYPERLIPIDEMIA: ICD-10-CM

## 2024-06-28 DIAGNOSIS — K21.9 CHRONIC GERD: Primary | ICD-10-CM

## 2024-06-28 DIAGNOSIS — E66.01 SEVERE OBESITY (BMI 35.0-39.9) WITH COMORBIDITY (HCC): ICD-10-CM

## 2024-06-28 DIAGNOSIS — I10 ESSENTIAL HYPERTENSION: ICD-10-CM

## 2024-06-28 DIAGNOSIS — R73.03 PREDIABETES: ICD-10-CM

## 2024-06-28 PROCEDURE — G8417 CALC BMI ABV UP PARAM F/U: HCPCS | Performed by: SURGERY

## 2024-06-28 PROCEDURE — 1036F TOBACCO NON-USER: CPT | Performed by: SURGERY

## 2024-06-28 PROCEDURE — 3074F SYST BP LT 130 MM HG: CPT | Performed by: SURGERY

## 2024-06-28 PROCEDURE — 99214 OFFICE O/P EST MOD 30 MIN: CPT | Performed by: SURGERY

## 2024-06-28 PROCEDURE — 3078F DIAST BP <80 MM HG: CPT | Performed by: SURGERY

## 2024-06-28 PROCEDURE — G8427 DOCREV CUR MEDS BY ELIG CLIN: HCPCS | Performed by: SURGERY

## 2024-06-28 NOTE — PROGRESS NOTES
Leslie Davis lost 12.4 lbs over 1 month.  Pt states that she cut out wine out and reduced alcohol intake.      Breakfast: Protein shake-premier with coffee or 2 egg omelet with mushrooms and spinach   Snack: none  Lunch: cucumbers + carrots + greek ranch dip-greek yogurt with ranch packet or salad-mixed greens + fruit with grilled chicken with Skinny girl dressing   Snack: none  Dinner: weight watchers meals-lemon pepper chicken with vegetables  Snack: 1 cup of plain greek yogurt + SF cheesecake powder+ fruit-drizzle with chocolate     Fluids: water-72 oz./day, 1 cup of black tea or 1 cup coffee      Is pt eating a lean protein source with all meals and snacks?  yes    Has pt decreased their portions using the plate method?  yes    Is pt choosing low fat/sugar free options?  yes    Is pt drinking at least 64 oz of clear liquids everyday?  yes    Has pt stopped drinking carbonation, caffeinated, and sugar sweetened beverages? no    Has pt sampled Unjury and/or Nectar protein?  Reviewed     Has pt attended a support group? Not scheduled ; will schedule 7/25/24     Participating in intentional exercise? no    Plan/Recommendations:   Switch to decaf coffee   Attend support group  Sample protein powder     Handouts: Pre-surgical Eating Guideline,     Araceli Carnes, LAURA, LD   
negative  Endocrine ROS: negative  Breast ROS: negative  Respiratory ROS: negative  Cardiovascular ROS: negative  Gastrointestinal ROS:negative  Genito-Urinary ROS: negative  Musculoskeletal ROS: negative   Skin ROS: negative    Physical Exam   Vitals Reviewed   Constitutional: Patient is oriented to person, place, and time. Patient appears well-developed and well-nourished. Patient is active and cooperative.  Non-toxic appearance. No distress.   HENT:   Head: Normocephalic and atraumatic. Head is without abrasion and without laceration. Hair is normal.   Right Ear: External ear normal. No lacerations. No drainage, swelling .   Left Ear: External ear normal. No lacerations. No drainage, swelling.   Nose/Mouth: no abrasions nor lesions.  Eyes: Conjunctivae, EOM and lids are normal. Right eye exhibits no discharge. No foreign body present in the right eye. Left eye exhibits no discharge. No foreign body present in the left eye. No scleral icterus.   Neck:No JVD present.   Pulmonary/Chest: Effort normal. No accessory muscle usage or stridor. No apnea. No respiratory distress.   Cardiovascular: Normal rate and no JVD.   Abdominal: Normal appearance. Patient exhibits no distension.    Musculoskeletal: Normal range of motion. Patient exhibits no edema.   Neurological: Patient is alert and oriented to person, place, and time.  Skin: Skin is warm and dry. No abrasion and no rash noted. Patient is not diaphoretic. No cyanosis or erythema.   Psychiatric: Patient has a normal mood and affect. Speech is normal and behavior is normal.       A/P    Leslie Davis is 48 y.o. female, Body mass index is 35.17 kg/m². pre surgery, has lost 12 lbs since last visit. The patient underwent extensive dietary counseling.  I have reviewed, discussed and agree with the dietary plan by the registered dietitian . Patient is trying hard to keep good dietary and behavior modifications. Patient is monitoring portion sizes, food choices and

## 2024-07-10 NOTE — PATIENT INSTRUCTIONS
in the future it is important to understand, as a bariatric surgery patient, there is a risk of it negatively affecting your weight loss goals and it can pose a risk for addiction.    You should be reducing added fat and sugar in your diet.  Frying foods adds too much fat and calories, but you could use an air fryer as it requires significantly less oil. Baking, broiling, or grilling meats add flavor without unhealthy fats. Using cooking oil spray and spray butter products are also healthy options that will aid in your weight loss. Foods high in added sugars are often also high in calories and low in nutrients.      If you are a smoker or use e-cigarettes/vaping, you must eliminate.  You must be nicotine free and/or not vaping for at least 8 weeks before your surgery can be scheduled. You will be screened for nicotine through lab work.     Eating habits after surgery need to be a long-term change. Eating habits are often so ingrained that it can be difficult to change. It is important to practice new eating habits prior to surgery to mentally prepare yourself for the challenge ahead. Also, remember that overall health, age, and genetics make each person's weight loss progress different. Do not compare your progress (pre- or post-operatively), the amount you eat, or your exercise to other patients.      In addition, it is the responsibility of the patient to schedule and follow up on labs and tests completed during the pre-surgical period. Results will be reviewed at each visit.    **IT IS IMPORTANT TO KNOW THAT YOU MUST COMPLETE ALL REQUIRED DIETARY CHANGES, TESTS, CLEARANCES AND ACTIVITIES BEFORE A SURGERY DATE CAN BE GIVEN. IF YOU HAVE NOT MET ANY OF THESE REQUIREMENTS THEN YOU WILL NOT BE GIVEN A SURGERY DATE UNTIL THEY HAVE BEEN MET TO OUR SATISFACTION. THIS IS NOT ONLY DONE TO HELP YOU BE SUCCESSFUL AFTER SURGERY, BUT TO BE SAFE AS WELL.**    Patient received dietary handouts and education.

## 2024-07-15 LAB — PAP SMEAR, EXTERNAL: NEGATIVE

## 2024-07-17 ENCOUNTER — TELEPHONE (OUTPATIENT)
Dept: INTERNAL MEDICINE CLINIC | Age: 49
End: 2024-07-17

## 2024-07-17 DIAGNOSIS — R73.03 PREDIABETES: ICD-10-CM

## 2024-07-17 DIAGNOSIS — I10 ESSENTIAL HYPERTENSION: ICD-10-CM

## 2024-07-17 DIAGNOSIS — E66.01 SEVERE OBESITY (BMI 35.0-39.9) WITH COMORBIDITY (HCC): ICD-10-CM

## 2024-07-17 DIAGNOSIS — E53.8 VITAMIN B12 DEFICIENCY: ICD-10-CM

## 2024-07-17 DIAGNOSIS — K21.9 CHRONIC GERD: ICD-10-CM

## 2024-07-17 DIAGNOSIS — E78.2 MIXED HYPERLIPIDEMIA: ICD-10-CM

## 2024-07-17 LAB
25(OH)D3 SERPL-MCNC: 33.3 NG/ML
ALBUMIN SERPL-MCNC: 4.6 G/DL (ref 3.4–5)
ALBUMIN/GLOB SERPL: 2.1 {RATIO} (ref 1.1–2.2)
ALP SERPL-CCNC: 91 U/L (ref 40–129)
ALT SERPL-CCNC: 67 U/L (ref 10–40)
ANION GAP SERPL CALCULATED.3IONS-SCNC: 14 MMOL/L (ref 3–16)
AST SERPL-CCNC: 48 U/L (ref 15–37)
BASOPHILS # BLD: 0.1 K/UL (ref 0–0.2)
BASOPHILS NFR BLD: 1 %
BILIRUB SERPL-MCNC: 0.3 MG/DL (ref 0–1)
BUN SERPL-MCNC: 13 MG/DL (ref 7–20)
CALCIUM SERPL-MCNC: 9.6 MG/DL (ref 8.3–10.6)
CHLORIDE SERPL-SCNC: 100 MMOL/L (ref 99–110)
CHOLEST SERPL-MCNC: 164 MG/DL (ref 0–199)
CO2 SERPL-SCNC: 26 MMOL/L (ref 21–32)
CREAT SERPL-MCNC: 0.8 MG/DL (ref 0.6–1.1)
DEPRECATED RDW RBC AUTO: 12.2 % (ref 12.4–15.4)
EOSINOPHIL # BLD: 0.1 K/UL (ref 0–0.6)
EOSINOPHIL NFR BLD: 2.2 %
EST. AVERAGE GLUCOSE BLD GHB EST-MCNC: 111.2 MG/DL
FOLATE SERPL-MCNC: 12.46 NG/ML (ref 4.78–24.2)
GFR SERPLBLD CREATININE-BSD FMLA CKD-EPI: >90 ML/MIN/{1.73_M2}
GLUCOSE SERPL-MCNC: 122 MG/DL (ref 70–99)
HBA1C MFR BLD: 5.5 %
HCT VFR BLD AUTO: 39.9 % (ref 36–48)
HDLC SERPL-MCNC: 46 MG/DL (ref 40–60)
HGB BLD-MCNC: 13.7 G/DL (ref 12–16)
INR PPP: 0.93 (ref 0.85–1.15)
IRON SATN MFR SERPL: 24 % (ref 15–50)
IRON SERPL-MCNC: 85 UG/DL (ref 37–145)
LDLC SERPL CALC-MCNC: 96 MG/DL
LYMPHOCYTES # BLD: 1.3 K/UL (ref 1–5.1)
LYMPHOCYTES NFR BLD: 25.6 %
MCH RBC QN AUTO: 31.5 PG (ref 26–34)
MCHC RBC AUTO-ENTMCNC: 34.4 G/DL (ref 31–36)
MCV RBC AUTO: 91.7 FL (ref 80–100)
MONOCYTES # BLD: 0.4 K/UL (ref 0–1.3)
MONOCYTES NFR BLD: 8.3 %
NEUTROPHILS # BLD: 3.1 K/UL (ref 1.7–7.7)
NEUTROPHILS NFR BLD: 62.9 %
PLATELET # BLD AUTO: 217 K/UL (ref 135–450)
PMV BLD AUTO: 10.9 FL (ref 5–10.5)
POTASSIUM SERPL-SCNC: 4.2 MMOL/L (ref 3.5–5.1)
PROT SERPL-MCNC: 6.8 G/DL (ref 6.4–8.2)
PROTHROMBIN TIME: 12.7 SEC (ref 11.9–14.9)
RBC # BLD AUTO: 4.35 M/UL (ref 4–5.2)
SODIUM SERPL-SCNC: 140 MMOL/L (ref 136–145)
T4 FREE SERPL-MCNC: 1.1 NG/DL (ref 0.9–1.8)
TIBC SERPL-MCNC: 359 UG/DL (ref 260–445)
TRIGL SERPL-MCNC: 112 MG/DL (ref 0–150)
TSH SERPL DL<=0.005 MIU/L-ACNC: 4.87 UIU/ML (ref 0.27–4.2)
VIT B12 SERPL-MCNC: 492 PG/ML (ref 211–911)
VLDLC SERPL CALC-MCNC: 22 MG/DL
WBC # BLD AUTO: 5 K/UL (ref 4–11)

## 2024-07-17 NOTE — TELEPHONE ENCOUNTER
Pt calling you had referred her to Dr Jung for weight loss and he needs a letter of medical necessity from you-if possible needs before her next appt with  him 7/22--please let her know when done--thanks.

## 2024-07-19 LAB
A-TOCOPHEROL VIT E SERPL-MCNC: 7.8 MG/L (ref 5.5–18)
ANNOTATION COMMENT IMP: NORMAL
BETA+GAMMA TOCOPHEROL SERPL-MCNC: 1.1 MG/L (ref 0–6)
RETINYL PALMITATE SERPL-MCNC: 0.02 MG/L (ref 0–0.1)
VIT A SERPL-MCNC: 0.46 MG/L (ref 0.3–1.2)

## 2024-07-21 LAB — VIT B1 BLD-MCNC: 106 NMOL/L (ref 70–180)

## 2024-07-22 ENCOUNTER — OFFICE VISIT (OUTPATIENT)
Dept: BARIATRICS/WEIGHT MGMT | Age: 49
End: 2024-07-22
Payer: OTHER GOVERNMENT

## 2024-07-22 VITALS
OXYGEN SATURATION: 98 % | HEIGHT: 62 IN | WEIGHT: 185 LBS | RESPIRATION RATE: 15 BRPM | DIASTOLIC BLOOD PRESSURE: 86 MMHG | HEART RATE: 86 BPM | BODY MASS INDEX: 34.04 KG/M2 | SYSTOLIC BLOOD PRESSURE: 124 MMHG

## 2024-07-22 DIAGNOSIS — I10 ESSENTIAL HYPERTENSION: ICD-10-CM

## 2024-07-22 DIAGNOSIS — E78.2 MIXED HYPERLIPIDEMIA: ICD-10-CM

## 2024-07-22 DIAGNOSIS — E66.9 OBESITY (BMI 30.0-34.9): ICD-10-CM

## 2024-07-22 DIAGNOSIS — K21.9 CHRONIC GERD: Primary | ICD-10-CM

## 2024-07-22 PROBLEM — E66.811 OBESITY (BMI 30.0-34.9): Status: ACTIVE | Noted: 2024-07-22

## 2024-07-22 PROCEDURE — G8417 CALC BMI ABV UP PARAM F/U: HCPCS | Performed by: NURSE PRACTITIONER

## 2024-07-22 PROCEDURE — 1036F TOBACCO NON-USER: CPT | Performed by: NURSE PRACTITIONER

## 2024-07-22 PROCEDURE — G8427 DOCREV CUR MEDS BY ELIG CLIN: HCPCS | Performed by: NURSE PRACTITIONER

## 2024-07-22 PROCEDURE — 99214 OFFICE O/P EST MOD 30 MIN: CPT | Performed by: NURSE PRACTITIONER

## 2024-07-22 PROCEDURE — 3079F DIAST BP 80-89 MM HG: CPT | Performed by: NURSE PRACTITIONER

## 2024-07-22 PROCEDURE — 3074F SYST BP LT 130 MM HG: CPT | Performed by: NURSE PRACTITIONER

## 2024-07-22 RX ORDER — OXYCODONE HYDROCHLORIDE AND ACETAMINOPHEN 5; 325 MG/1; MG/1
1 TABLET ORAL EVERY 6 HOURS PRN
COMMUNITY
Start: 2024-07-12

## 2024-07-22 RX ORDER — AMOXICILLIN AND CLAVULANATE POTASSIUM 875; 125 MG/1; MG/1
1 TABLET, FILM COATED ORAL 2 TIMES DAILY
COMMUNITY
Start: 2024-07-12

## 2024-07-22 NOTE — PROGRESS NOTES
Leslie Davis lost 4.2 lbs over past ~ 3 weeks. Pt is following weight watchers tries to follow lower carb. Pt had recent diverticulitis flare up and will be on ATB for the next 2 days.     Is pt eating at least 4 times everyday? Eating 3-4 x day     Is pt eating a lean protein source with all meals and snacks? Yes   B - 1 egg omelette with mushrooms/spinach in olive oil spray OR protein waffle with a peach and some SF rediwhip  L - protein tortilla wrap with single serving of hummus with turkey/ivan/tomato/onion/6 olives  D - Canes - 2 chicken fingers with fries OR 8 grilled nuggets with kale side salad OR chicken with green beans and broccoli OR blacekened tilapia with green beans and 4 shrimp with small amount of wayne rice   S - sometimes - 1/2-1 cup of FF plain greek yogurt with 2-3 Tbsp of SF jello pudding mix with some berries     Has pt decreased their portions using the plate method? Portions have decreased     Is pt choosing low fat/sugar free options? Yes     Is pt drinking at least 64 oz of clear liquids everyday? Yes     Has pt stopped drinking carbonation, caffeinated, and sugar sweetened beverages? Yes - drinks water, eliminated black tea and alcohol, no coffee since last visit.     Has pt sampled Unjury and/or Nectar protein? Purchased and liked the cookies and cream and the cherry and tolerated fine     Has pt attended a support group? Scheduled for 7/25 SG    Participating in intentional exercise? Pt is walking but is still recovering from diverticulitis. Sometimes walking on pavement bothers her back so she has a stationary bike at home she can use when needed.     Plan/Recommendations:   Eat 4 x day   Attend SG   Walk as tolerated     Handouts: none     Teressa Chilel, RD, LD  
  Musculoskeletal: Negative.    Skin: Negative.    Allergic/Immunologic: Negative.    Neurological: Negative.    Hematological: Negative.    Psychiatric/Behavioral: Negative.         Objective:   Physical Exam  Vitals reviewed.   Constitutional:       Appearance: Normal appearance. She is well-developed. She is obese.   HENT:      Head: Normocephalic and atraumatic.   Eyes:      Conjunctiva/sclera: Conjunctivae normal.      Pupils: Pupils are equal, round, and reactive to light.   Cardiovascular:      Rate and Rhythm: Normal rate.   Pulmonary:      Effort: Pulmonary effort is normal.   Abdominal:      Palpations: Abdomen is soft.   Musculoskeletal:         General: Normal range of motion.      Cervical back: Normal range of motion and neck supple.   Skin:     General: Skin is warm and dry.   Neurological:      Mental Status: She is alert and oriented to person, place, and time.   Psychiatric:         Mood and Affect: Mood normal.         Behavior: Behavior normal.         Thought Content: Thought content normal.         Judgment: Judgment normal.       Patient is here for their 3rd presurgery visit for sleeve, down 4.2 lbs. The patient's current Body mass index is 34.39 kg/m². (7/22/24). She is making good dietary and behavior modifications. She did meet with the registered dietitian for continued follow up. Discussed with dietitian and I agree with recommendations and plan. She is exercising with some walking, but can be limited with back pain. Encouraged physical activity as tolerated. Patient received instruction that it is recommended to avoid pregnancy following bariatric surgery for at least 2 years to allow them to have stable weight loss and to help avoid increased risk of vitamin deficiencies and malnutrition. The patient was encouraged to discuss possible contraceptive methods with their PCP or OBGYN. Patient counseled on the avoidance of tobacco/nicotine, alcohol and illicit drug abuse. Reviewed labs from

## 2024-07-23 ENCOUNTER — PATIENT MESSAGE (OUTPATIENT)
Dept: BARIATRICS/WEIGHT MGMT | Age: 49
End: 2024-07-23

## 2024-07-25 ENCOUNTER — PATIENT MESSAGE (OUTPATIENT)
Dept: INTERNAL MEDICINE CLINIC | Age: 49
End: 2024-07-25

## 2024-07-25 NOTE — TELEPHONE ENCOUNTER
From: Sony Bruno  To: Leslie Courtney Davis  Sent: 7/23/2024 2:37 PM EDT  Subject: Surgery    Valery Leslie,  I spoke with the surgery scheduler and she said we would need to have that two year weight history. She said if you wanted to keep coming for visits once we got through 2024 we would have a two year weight history utilizing 2024 after the end of the year and the 2023 which we already have. This is something you can keep in mind, but given you are doing well with your weight loss I think it would be beneficial just to see how things go moving forward. If you lose weight on your own that would be great, but if you get to a point where you think surgery would be a better option we can address that later on. Keep working hard because you are doing great!  Thank you,  RIKKI WynnC

## 2024-08-07 NOTE — PROGRESS NOTES
[facility]   [employer]    Subjective:      Patient ID: Leslie Davis is a 48 y.o. female    HPI    The patient is here for their bariatric surgery presurgical visit for future weight loss. She has made several attempts at weight loss in the past without success and now wishes to pursue bariatric surgery. She is working to change her dietary behaviors and lose weight to improve comorbid conditions such as hypertension, prediabetes, hyperlipidemia and GERD.    Leslie Davis is a 48 y.o. female with Body mass index is 33.27 kg/m².    Past Medical History:   Diagnosis Date    Anxiety     Chronic pain     back    Closed fracture of left distal radius 2018    Depression     EDS (Terri-Danlos syndrome)     Hypertension     Insomnia      Past Surgical History:   Procedure Laterality Date    BACK SURGERY      RODS AND SCREWS IN PLACE    BUNIONECTOMY Right 2019    LAPIDUS BUNIONECTOMY RIGHT FOOT WITH BRANDON OSTEOTOMY - MONI performed by Robby Nicole DPM at [facility] OR    CARPAL TUNNEL RELEASE Left      SECTION      CHOLECYSTECTOMY  10/25/2023    FOOT SURGERY Right 2022    REMOVAL OF PAINFUL HARDWARE RIGHT FOOT-MONI performed by Robby Nicole DPM at [facility] OR    UPPER GASTROINTESTINAL ENDOSCOPY N/A 2024    ESOPHAGOGASTRODUODENOSCOPY BIOPSY performed by Joe Jung MD at [facility] ENDOSCOPY     Family History   Problem Relation Age of Onset    Mental Illness Mother     Arthritis Mother     Elevated Lipids Mother     Hypertension Mother     High Blood Pressure Mother     Migraines Mother     Alcohol Abuse Father     Mental Illness Father     Arthritis Father     Breast Cancer Neg Hx     Ovarian Cancer Neg Hx      Social History     Tobacco Use    Smoking status: Former     Types: Cigarettes    Smokeless tobacco: Never   Substance Use Topics    Alcohol use: Not Currently     Alcohol/week: 6.0 standard drinks of alcohol     Types:  referrals and communication with referring physicians, coordination of care; discussing exercise and physical activity; discussing dietary plan/recall with the patient as well with registered dietitian and documentation in the EHR. Of note, the above was done during same day of the actual patient encounter.

## 2024-08-07 NOTE — PATIENT INSTRUCTIONS
Goals in preparing for bariatric surgery  You should be giving up all beverages that have carbonation, sugar, and caffeine (Refer to the approved liquids list provided at initial visit).  You should be drinking 64 ounces of low calorie (5 calories or less per serving) fluids per day. Suggestions include:  Water (you may add fresh lemon or lime)  Crystal Light  Jenners Liquid Water Enhancer  Propel Zero  Powerade Zero/Gatorade Zero  Isopure  [id number]  SOBE Lifewater Zero  Vitamin Water Zero  Sugar Free Rafael-Aid  You should be eating 4-6 times per day.  Three small meals plus 1-2 snacks per day is your goal. This balances your calories and nutrients evenly throughout the day and helps to boost your metabolism. Refer to the snack list provided at your initial visit. Aim for a protein at every snack, plus a fruit, vegetable or starch.  You should be eating protein at every meal and snack.  Protein is typically found in animal sources, i.e. chicken, lean beef, lean pork, fish, seafood and eggs. It is also found in low-fat dairy sources such as skim or 1% milk, low-fat yogurt, low-fat cheese, and low-fat cottage cheese. Plant based sources of protein include peanut butter, beans, and soy.  You should be utilizing the 9-inch plate method.  Eating on a smaller plate will help you control portion size, but what you put on your plate counts also. Make ¼ of your plate lean protein, ¼ carbohydrate (fruit, grain or starchy vegetables) and ½ the plate non-starchy vegetables.  You should eliminate caffeine.  Caffeine is dehydrating. After surgery, it's very important to stay hydrated. Giving up caffeine before surgery will help you focus on the changes necessary to be successful after surgery. There are many decaffeinated coffee and tea products available in grocery stores.    You should eliminate alcohol.  You must abstain from alcohol prior to surgery and for at least the first 6-9 months after surgery. Although you may have alcohol

## 2024-08-14 RX ORDER — TRAZODONE HYDROCHLORIDE 50 MG/1
50 TABLET ORAL NIGHTLY
Qty: 90 TABLET | Refills: 1 | Status: SHIPPED | OUTPATIENT
Start: 2024-08-14

## 2024-08-26 ENCOUNTER — OFFICE VISIT (OUTPATIENT)
Dept: BARIATRICS/WEIGHT MGMT | Age: 49
End: 2024-08-26
Payer: OTHER GOVERNMENT

## 2024-08-26 VITALS
BODY MASS INDEX: 32.94 KG/M2 | HEART RATE: 95 BPM | RESPIRATION RATE: 16 BRPM | WEIGHT: 179 LBS | OXYGEN SATURATION: 97 % | DIASTOLIC BLOOD PRESSURE: 81 MMHG | SYSTOLIC BLOOD PRESSURE: 121 MMHG | HEIGHT: 62 IN

## 2024-08-26 DIAGNOSIS — I10 ESSENTIAL HYPERTENSION: ICD-10-CM

## 2024-08-26 DIAGNOSIS — K21.9 CHRONIC GERD: Primary | ICD-10-CM

## 2024-08-26 DIAGNOSIS — E78.2 MIXED HYPERLIPIDEMIA: ICD-10-CM

## 2024-08-26 DIAGNOSIS — R73.03 PREDIABETES: ICD-10-CM

## 2024-08-26 PROCEDURE — 3074F SYST BP LT 130 MM HG: CPT | Performed by: NURSE PRACTITIONER

## 2024-08-26 PROCEDURE — 99214 OFFICE O/P EST MOD 30 MIN: CPT | Performed by: NURSE PRACTITIONER

## 2024-08-26 PROCEDURE — G8427 DOCREV CUR MEDS BY ELIG CLIN: HCPCS | Performed by: NURSE PRACTITIONER

## 2024-08-26 PROCEDURE — 3079F DIAST BP 80-89 MM HG: CPT | Performed by: NURSE PRACTITIONER

## 2024-08-26 PROCEDURE — 1036F TOBACCO NON-USER: CPT | Performed by: NURSE PRACTITIONER

## 2024-08-26 PROCEDURE — G8417 CALC BMI ABV UP PARAM F/U: HCPCS | Performed by: NURSE PRACTITIONER
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
## 2024-09-12 NOTE — PATIENT INSTRUCTIONS
Goals in preparing for bariatric surgery  You should be giving up all beverages that have carbonation, sugar, and caffeine (Refer to the approved liquids list provided at initial visit).  You should be drinking 64 ounces of low calorie (5 calories or less per serving) fluids per day. Suggestions include:  Water (you may add fresh lemon or lime)  Crystal Light  [place] Liquid Water Enhancer  Propel Zero  Powerade Zero/Gatorade Zero  Isopure  [id number]  SOBE Lifewater Zero  Vitamin Water Zero  Sugar Free Rafael-Aid  You should be eating 4-6 times per day.  Three small meals plus 1-2 snacks per day is your goal. This balances your calories and nutrients evenly throughout the day and helps to boost your metabolism. Refer to the snack list provided at your initial visit. Aim for a protein at every snack, plus a fruit, vegetable or starch.  You should be eating protein at every meal and snack.  Protein is typically found in animal sources, i.e. chicken, lean beef, lean pork, fish, seafood and eggs. It is also found in low-fat dairy sources such as skim or 1% milk, low-fat yogurt, low-fat cheese, and low-fat cottage cheese. Plant based sources of protein include peanut butter, beans, and soy.  You should be utilizing the 9-inch plate method.  Eating on a smaller plate will help you control portion size, but what you put on your plate counts also. Make ¼ of your plate lean protein, ¼ carbohydrate (fruit, grain or starchy vegetables) and ½ the plate non-starchy vegetables.  You should eliminate caffeine.  Caffeine is dehydrating. After surgery, it's very important to stay hydrated. Giving up caffeine before surgery will help you focus on the changes necessary to be successful after surgery. There are many decaffeinated coffee and tea products available in grocery stores.    You should eliminate alcohol.  You must abstain from alcohol prior to surgery and for at least the first 6-9 months after surgery. Although you may have alcohol

## 2024-09-20 ENCOUNTER — OFFICE VISIT (OUTPATIENT)
Dept: PSYCHIATRY | Age: 49
End: 2024-09-20
Payer: OTHER GOVERNMENT

## 2024-09-20 VITALS
WEIGHT: 169.8 LBS | SYSTOLIC BLOOD PRESSURE: 112 MMHG | RESPIRATION RATE: 16 BRPM | HEART RATE: 74 BPM | HEIGHT: 61 IN | DIASTOLIC BLOOD PRESSURE: 64 MMHG | OXYGEN SATURATION: 98 % | BODY MASS INDEX: 32.06 KG/M2

## 2024-09-20 DIAGNOSIS — F33.42 RECURRENT MAJOR DEPRESSIVE DISORDER, IN FULL REMISSION (HCC): Primary | ICD-10-CM

## 2024-09-20 DIAGNOSIS — F90.0 ATTENTION DEFICIT HYPERACTIVITY DISORDER (ADHD), PREDOMINANTLY INATTENTIVE TYPE: ICD-10-CM

## 2024-09-20 PROCEDURE — 3074F SYST BP LT 130 MM HG: CPT | Performed by: PSYCHIATRY & NEUROLOGY

## 2024-09-20 PROCEDURE — G8417 CALC BMI ABV UP PARAM F/U: HCPCS | Performed by: PSYCHIATRY & NEUROLOGY

## 2024-09-20 PROCEDURE — 1036F TOBACCO NON-USER: CPT | Performed by: PSYCHIATRY & NEUROLOGY

## 2024-09-20 PROCEDURE — 3078F DIAST BP <80 MM HG: CPT | Performed by: PSYCHIATRY & NEUROLOGY

## 2024-09-20 PROCEDURE — G8427 DOCREV CUR MEDS BY ELIG CLIN: HCPCS | Performed by: PSYCHIATRY & NEUROLOGY

## 2024-09-20 PROCEDURE — 99214 OFFICE O/P EST MOD 30 MIN: CPT | Performed by: PSYCHIATRY & NEUROLOGY

## 2024-09-20 RX ORDER — METHYLPHENIDATE HYDROCHLORIDE 27 MG/1
27 TABLET ORAL DAILY
Qty: 14 TABLET | Refills: 0 | Status: SHIPPED | OUTPATIENT
Start: 2024-09-20 | End: 2024-10-04

## 2024-09-20 RX ORDER — BUPROPION HYDROCHLORIDE 150 MG/1
150 TABLET ORAL EVERY MORNING
Qty: 7 TABLET | Refills: 0 | Status: SHIPPED | OUTPATIENT
Start: 2024-09-20 | End: 2024-09-30 | Stop reason: ALTCHOICE

## 2024-09-20 ASSESSMENT — ANXIETY QUESTIONNAIRES
2. NOT BEING ABLE TO STOP OR CONTROL WORRYING: NOT AT ALL
6. BECOMING EASILY ANNOYED OR IRRITABLE: SEVERAL DAYS
5. BEING SO RESTLESS THAT IT IS HARD TO SIT STILL: NOT AT ALL
4. TROUBLE RELAXING: SEVERAL DAYS
IF YOU CHECKED OFF ANY PROBLEMS ON THIS QUESTIONNAIRE, HOW DIFFICULT HAVE THESE PROBLEMS MADE IT FOR YOU TO DO YOUR WORK, TAKE CARE OF THINGS AT HOME, OR GET ALONG WITH OTHER PEOPLE: VERY DIFFICULT
GAD7 TOTAL SCORE: 4
7. FEELING AFRAID AS IF SOMETHING AWFUL MIGHT HAPPEN: NOT AT ALL
3. WORRYING TOO MUCH ABOUT DIFFERENT THINGS: SEVERAL DAYS
1. FEELING NERVOUS, ANXIOUS, OR ON EDGE: SEVERAL DAYS

## 2024-09-20 ASSESSMENT — PATIENT HEALTH QUESTIONNAIRE - PHQ9
4. FEELING TIRED OR HAVING LITTLE ENERGY: SEVERAL DAYS
SUM OF ALL RESPONSES TO PHQ9 QUESTIONS 1 & 2: 2
1. LITTLE INTEREST OR PLEASURE IN DOING THINGS: SEVERAL DAYS
7. TROUBLE CONCENTRATING ON THINGS, SUCH AS READING THE NEWSPAPER OR WATCHING TELEVISION: MORE THAN HALF THE DAYS
9. THOUGHTS THAT YOU WOULD BE BETTER OFF DEAD, OR OF HURTING YOURSELF: NOT AT ALL
SUM OF ALL RESPONSES TO PHQ QUESTIONS 1-9: 5
2. FEELING DOWN, DEPRESSED OR HOPELESS: SEVERAL DAYS
SUM OF ALL RESPONSES TO PHQ QUESTIONS 1-9: 5
10. IF YOU CHECKED OFF ANY PROBLEMS, HOW DIFFICULT HAVE THESE PROBLEMS MADE IT FOR YOU TO DO YOUR WORK, TAKE CARE OF THINGS AT HOME, OR GET ALONG WITH OTHER PEOPLE: VERY DIFFICULT
6. FEELING BAD ABOUT YOURSELF - OR THAT YOU ARE A FAILURE OR HAVE LET YOURSELF OR YOUR FAMILY DOWN: NOT AT ALL
SUM OF ALL RESPONSES TO PHQ QUESTIONS 1-9: 5
5. POOR APPETITE OR OVEREATING: NOT AT ALL
8. MOVING OR SPEAKING SO SLOWLY THAT OTHER PEOPLE COULD HAVE NOTICED. OR THE OPPOSITE, BEING SO FIGETY OR RESTLESS THAT YOU HAVE BEEN MOVING AROUND A LOT MORE THAN USUAL: NOT AT ALL
3. TROUBLE FALLING OR STAYING ASLEEP: NOT AT ALL
SUM OF ALL RESPONSES TO PHQ QUESTIONS 1-9: 5

## 2024-09-30 ENCOUNTER — OFFICE VISIT (OUTPATIENT)
Dept: BARIATRICS/WEIGHT MGMT | Age: 49
End: 2024-09-30
Payer: OTHER GOVERNMENT

## 2024-09-30 VITALS
HEART RATE: 102 BPM | HEIGHT: 62 IN | SYSTOLIC BLOOD PRESSURE: 124 MMHG | DIASTOLIC BLOOD PRESSURE: 84 MMHG | WEIGHT: 168 LBS | BODY MASS INDEX: 30.91 KG/M2

## 2024-09-30 DIAGNOSIS — K21.9 CHRONIC GERD: Primary | ICD-10-CM

## 2024-09-30 DIAGNOSIS — R73.03 PREDIABETES: ICD-10-CM

## 2024-09-30 DIAGNOSIS — I10 ESSENTIAL HYPERTENSION: ICD-10-CM

## 2024-09-30 DIAGNOSIS — E78.2 MIXED HYPERLIPIDEMIA: ICD-10-CM

## 2024-09-30 DIAGNOSIS — E66.811 OBESITY (BMI 30.0-34.9): ICD-10-CM

## 2024-09-30 PROCEDURE — G2211 COMPLEX E/M VISIT ADD ON: HCPCS | Performed by: NURSE PRACTITIONER

## 2024-09-30 PROCEDURE — 1036F TOBACCO NON-USER: CPT | Performed by: NURSE PRACTITIONER

## 2024-09-30 PROCEDURE — 99214 OFFICE O/P EST MOD 30 MIN: CPT | Performed by: NURSE PRACTITIONER

## 2024-09-30 PROCEDURE — 3079F DIAST BP 80-89 MM HG: CPT | Performed by: NURSE PRACTITIONER

## 2024-09-30 PROCEDURE — 3074F SYST BP LT 130 MM HG: CPT | Performed by: NURSE PRACTITIONER

## 2024-09-30 PROCEDURE — G8427 DOCREV CUR MEDS BY ELIG CLIN: HCPCS | Performed by: NURSE PRACTITIONER

## 2024-09-30 PROCEDURE — G8417 CALC BMI ABV UP PARAM F/U: HCPCS | Performed by: NURSE PRACTITIONER
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
## 2024-10-03 NOTE — PROGRESS NOTES
Current psychiatric medications:  Fetzima 80mg daily  Wellbutrin XL 300mg daily  Trazodone 50-75mg qhs prn sleep    O:  Vitals:    09/20/24 1057   BP: 112/64   Pulse: 74   Resp: 16   SpO2: 98%     Wt Readings from Last 3 Encounters:   09/30/24 76.2 kg (168 lb)   09/20/24 77 kg (169 lb 12.8 oz)   08/26/24 81.2 kg (179 lb)            9/20/2024    11:01 AM 6/19/2024     9:10 AM 3/14/2024    10:08 AM 12/6/2023    10:56 AM 9/8/2023    10:14 AM 6/2/2023    11:05 AM 3/3/2023     9:35 AM   PHQ Scores   PHQ2 Score 2 2 4 5 3 2 4   PHQ9 Score 5 8 17 18 16 7 17         9/20/2024    11:00 AM 6/19/2024     9:00 AM 3/14/2024    10:00 AM 12/6/2023    10:00 AM 9/8/2023    10:00 AM 6/2/2023    11:00 AM 3/3/2023     9:00 AM   NATHAN 7 SCORE   NATHAN-7 Total Score 4 4 12 11 10 6 10     Mental Status Exam:   Appearance    alert, cooperative  Motor: No abnormal movements, tics or mannerisms.  Speech    spontaneous, normal rate and normal volume  Mood/Affect  good / full quality, good motility and range  Thought Process    linear, goal directed and coherent  Thought Content    intact , no suicidal ideation  Associations    logical connections  Attention/Concentration    intact  Memory    recent and remote memory intact  Insight/Judgement    Good / Intact    Labs:  Lab Results   Component Value Date    TSHFT4 2.10 03/03/2023    TSH 4.87 (H) 07/17/2024     Lab Results   Component Value Date    UMAVBCPZ07 492 07/17/2024     Lab Results   Component Value Date    WBC 5.0 07/17/2024    HGB 13.7 07/17/2024    HCT 39.9 07/17/2024    MCV 91.7 07/17/2024     07/17/2024     No results found for: \"LDLDIRECT\"    A:  47 yo F with depression and anxiety. Doing a lot better with her mood. Seeing improvement with her diet and weight loss goals likely is helping. Fetzima may be helping. Despite this she is not seeing improvement in her focus and history is suggestive of a diagnosis of ADHD, which we have explored in several past visit however dx had

## 2024-10-07 ENCOUNTER — PATIENT MESSAGE (OUTPATIENT)
Dept: PSYCHIATRY | Age: 49
End: 2024-10-07

## 2024-10-07 DIAGNOSIS — F90.0 ATTENTION DEFICIT HYPERACTIVITY DISORDER (ADHD), PREDOMINANTLY INATTENTIVE TYPE: Primary | ICD-10-CM

## 2024-10-07 RX ORDER — METHYLPHENIDATE HYDROCHLORIDE 10 MG/1
10 CAPSULE, EXTENDED RELEASE ORAL DAILY
Qty: 14 CAPSULE | Refills: 0 | Status: SHIPPED | OUTPATIENT
Start: 2024-10-07 | End: 2024-10-21

## 2024-10-21 ENCOUNTER — PATIENT MESSAGE (OUTPATIENT)
Dept: PSYCHIATRY | Age: 49
End: 2024-10-21

## 2024-10-21 DIAGNOSIS — F90.0 ATTENTION DEFICIT HYPERACTIVITY DISORDER (ADHD), PREDOMINANTLY INATTENTIVE TYPE: ICD-10-CM

## 2024-10-22 RX ORDER — METHYLPHENIDATE HYDROCHLORIDE 10 MG/1
10 CAPSULE, EXTENDED RELEASE ORAL DAILY
Qty: 30 CAPSULE | Refills: 0 | Status: SHIPPED | OUTPATIENT
Start: 2024-10-22 | End: 2024-10-24 | Stop reason: DRUGHIGH

## 2024-10-24 RX ORDER — METHYLPHENIDATE HYDROCHLORIDE 5 MG/1
TABLET ORAL
Qty: 45 TABLET | Refills: 0 | Status: SHIPPED | OUTPATIENT
Start: 2024-10-24 | End: 2024-11-23

## 2024-10-30 PROBLEM — R73.03 PREDIABETES: Status: RESOLVED | Noted: 2024-05-17 | Resolved: 2024-10-30

## 2024-10-30 NOTE — PATIENT INSTRUCTIONS
Goals in preparing for bariatric surgery  You should be giving up all beverages that have carbonation, sugar, and caffeine (Refer to the approved liquids list provided at initial visit).  You should be drinking 64 ounces of low calorie (5 calories or less per serving) fluids per day. Suggestions include:  Water (you may add fresh lemon or lime)  Crystal Light  [place] Liquid Water Enhancer  Propel Zero  Powerade Zero/Gatorade Zero  Isopure  [id number]  SOBE Lifewater Zero  Vitamin Water Zero  Sugar Free Rafael-Aid  You should be eating 4-6 times per day.  Three small meals plus 1-2 snacks per day is your goal. This balances your calories and nutrients evenly throughout the day and helps to boost your metabolism. Refer to the snack list provided at your initial visit. Aim for a protein at every snack, plus a fruit, vegetable or starch.  You should be eating protein at every meal and snack.  Protein is typically found in animal sources, i.e. chicken, lean beef, lean pork, fish, seafood and eggs. It is also found in low-fat dairy sources such as skim or 1% milk, low-fat yogurt, low-fat cheese, and low-fat cottage cheese. Plant based sources of protein include peanut butter, beans, and soy.  You should be utilizing the 9-inch plate method.  Eating on a smaller plate will help you control portion size, but what you put on your plate counts also. Make ¼ of your plate lean protein, ¼ carbohydrate (fruit, grain or starchy vegetables) and ½ the plate non-starchy vegetables.  You should eliminate caffeine.  Caffeine is dehydrating. After surgery, it's very important to stay hydrated. Giving up caffeine before surgery will help you focus on the changes necessary to be successful after surgery. There are many decaffeinated coffee and tea products available in grocery stores.    You should eliminate alcohol.  You must abstain from alcohol prior to surgery and for at least the first 6-9 months after surgery. Although you may have alcohol

## 2024-11-10 DIAGNOSIS — I10 ESSENTIAL HYPERTENSION: ICD-10-CM

## 2024-11-11 RX ORDER — VALSARTAN AND HYDROCHLOROTHIAZIDE 160; 25 MG/1; MG/1
1 TABLET ORAL DAILY
Qty: 90 TABLET | Refills: 1 | Status: SHIPPED | OUTPATIENT
Start: 2024-11-11

## 2024-11-18 ENCOUNTER — OFFICE VISIT (OUTPATIENT)
Dept: PSYCHIATRY | Age: 49
End: 2024-11-18
Payer: OTHER GOVERNMENT

## 2024-11-18 VITALS
WEIGHT: 157.6 LBS | SYSTOLIC BLOOD PRESSURE: 118 MMHG | BODY MASS INDEX: 29.3 KG/M2 | HEART RATE: 84 BPM | DIASTOLIC BLOOD PRESSURE: 80 MMHG

## 2024-11-18 DIAGNOSIS — F33.42 RECURRENT MAJOR DEPRESSIVE DISORDER, IN FULL REMISSION (HCC): ICD-10-CM

## 2024-11-18 DIAGNOSIS — F90.0 ATTENTION DEFICIT HYPERACTIVITY DISORDER (ADHD), PREDOMINANTLY INATTENTIVE TYPE: Primary | ICD-10-CM

## 2024-11-18 PROCEDURE — 3074F SYST BP LT 130 MM HG: CPT | Performed by: PSYCHIATRY & NEUROLOGY

## 2024-11-18 PROCEDURE — 99214 OFFICE O/P EST MOD 30 MIN: CPT | Performed by: PSYCHIATRY & NEUROLOGY

## 2024-11-18 PROCEDURE — 3079F DIAST BP 80-89 MM HG: CPT | Performed by: PSYCHIATRY & NEUROLOGY

## 2024-11-18 PROCEDURE — 1036F TOBACCO NON-USER: CPT | Performed by: PSYCHIATRY & NEUROLOGY

## 2024-11-18 PROCEDURE — G8417 CALC BMI ABV UP PARAM F/U: HCPCS | Performed by: PSYCHIATRY & NEUROLOGY

## 2024-11-18 PROCEDURE — G8428 CUR MEDS NOT DOCUMENT: HCPCS | Performed by: PSYCHIATRY & NEUROLOGY

## 2024-11-18 PROCEDURE — G8484 FLU IMMUNIZE NO ADMIN: HCPCS | Performed by: PSYCHIATRY & NEUROLOGY

## 2024-11-18 RX ORDER — DEXMETHYLPHENIDATE HYDROCHLORIDE 5 MG/1
5 CAPSULE, EXTENDED RELEASE ORAL DAILY
Qty: 14 CAPSULE | Refills: 0 | Status: SHIPPED | OUTPATIENT
Start: 2024-11-24 | End: 2024-12-08

## 2024-11-18 ASSESSMENT — PATIENT HEALTH QUESTIONNAIRE - PHQ9
2. FEELING DOWN, DEPRESSED OR HOPELESS: NOT AT ALL
SUM OF ALL RESPONSES TO PHQ9 QUESTIONS 1 & 2: 0
SUM OF ALL RESPONSES TO PHQ QUESTIONS 1-9: 1
7. TROUBLE CONCENTRATING ON THINGS, SUCH AS READING THE NEWSPAPER OR WATCHING TELEVISION: SEVERAL DAYS
SUM OF ALL RESPONSES TO PHQ QUESTIONS 1-9: 1
1. LITTLE INTEREST OR PLEASURE IN DOING THINGS: NOT AT ALL
5. POOR APPETITE OR OVEREATING: NOT AT ALL
SUM OF ALL RESPONSES TO PHQ QUESTIONS 1-9: 1
6. FEELING BAD ABOUT YOURSELF - OR THAT YOU ARE A FAILURE OR HAVE LET YOURSELF OR YOUR FAMILY DOWN: NOT AT ALL
SUM OF ALL RESPONSES TO PHQ QUESTIONS 1-9: 1
9. THOUGHTS THAT YOU WOULD BE BETTER OFF DEAD, OR OF HURTING YOURSELF: NOT AT ALL
4. FEELING TIRED OR HAVING LITTLE ENERGY: NOT AT ALL
3. TROUBLE FALLING OR STAYING ASLEEP: NOT AT ALL
8. MOVING OR SPEAKING SO SLOWLY THAT OTHER PEOPLE COULD HAVE NOTICED. OR THE OPPOSITE, BEING SO FIGETY OR RESTLESS THAT YOU HAVE BEEN MOVING AROUND A LOT MORE THAN USUAL: NOT AT ALL
10. IF YOU CHECKED OFF ANY PROBLEMS, HOW DIFFICULT HAVE THESE PROBLEMS MADE IT FOR YOU TO DO YOUR WORK, TAKE CARE OF THINGS AT HOME, OR GET ALONG WITH OTHER PEOPLE: SOMEWHAT DIFFICULT

## 2024-11-18 ASSESSMENT — ANXIETY QUESTIONNAIRES
GAD7 TOTAL SCORE: 5
IF YOU CHECKED OFF ANY PROBLEMS ON THIS QUESTIONNAIRE, HOW DIFFICULT HAVE THESE PROBLEMS MADE IT FOR YOU TO DO YOUR WORK, TAKE CARE OF THINGS AT HOME, OR GET ALONG WITH OTHER PEOPLE: SOMEWHAT DIFFICULT
3. WORRYING TOO MUCH ABOUT DIFFERENT THINGS: SEVERAL DAYS
2. NOT BEING ABLE TO STOP OR CONTROL WORRYING: SEVERAL DAYS
7. FEELING AFRAID AS IF SOMETHING AWFUL MIGHT HAPPEN: NOT AT ALL
4. TROUBLE RELAXING: SEVERAL DAYS
1. FEELING NERVOUS, ANXIOUS, OR ON EDGE: NOT AT ALL
5. BEING SO RESTLESS THAT IT IS HARD TO SIT STILL: SEVERAL DAYS
6. BECOMING EASILY ANNOYED OR IRRITABLE: SEVERAL DAYS

## 2024-11-18 NOTE — PROGRESS NOTES
PSYCHIATRY PROGRESS NOTE        Leslie Davis  1975  11/18/2024  PCP: Farida Madrigal, APRN - CNP    CC:   Chief Complaint   Patient presents with    ADHD     S:   Patient is doing well with stimulant treatment. She has been able to initiate tasks much easier, prioritize task better, and is no longer dealing with chronic problems with procrastination. She has been productive with organizing her home, getting rid of things she no longer needs.   She had issues with initial med trials. Concerta 27mg made her feel very irritable and unstable with her emotions and felt like too much medication. Ritalin LA was ok at 10mg but still felt like too high of a dose and caused a crash around 6 pm when she felt markedly more irritable when it wore off. Ritalin 5mg / 2.5mg is working out ok and is tolerated better but isn't ideal to do two doses and the pills are very small and a challenge to cut.   She continues to lose weight, manage her diet well, and is walking regularly. Enjoys her current job.     ROS: +weight loss (intentional), no cp, no palpitations    Brief Psych Hx:  Hosp: [facility] 2014, [facility] in 2016  Diagnoses: depression, anxiety  Meds: Wellbutrin (at first somewhat helpful), duloxetine (since 2015, helps joint pain), restoril, clonazepam. Prazosin. Early 20's tried sertraline ? Not helpful despite taking for a couple yrs. atomoxetine (somewhat helpful at 60mg)  [facility]: [facility] [place] [facility] to [facility], [facility] NP through [facility]. Was seeing a therapist there as well but therapist left the practice.   Suicide Attempts: twice (2014 - OD on medications, 2015 OD on medications)     Current psychiatric medications:  Fetzima 80mg daily  Ritalin 5mg / 2.5mg   Trazodone 50-75mg qhs prn sleep    O:  Vitals:    11/18/24 1038   BP: 118/80   Pulse: 84     Wt Readings from Last 3 Encounters:   11/18/24 71.5 kg (157 lb 9.6 oz)   09/30/24 76.2 kg (168 lb)   09/20/24 77 kg (169 lb 12.8 oz)

## 2024-11-25 ENCOUNTER — OFFICE VISIT (OUTPATIENT)
Dept: BARIATRICS/WEIGHT MGMT | Age: 49
End: 2024-11-25
Payer: OTHER GOVERNMENT

## 2024-11-25 VITALS
SYSTOLIC BLOOD PRESSURE: 117 MMHG | WEIGHT: 154 LBS | HEART RATE: 97 BPM | HEIGHT: 62 IN | DIASTOLIC BLOOD PRESSURE: 81 MMHG | BODY MASS INDEX: 28.34 KG/M2

## 2024-11-25 DIAGNOSIS — I10 ESSENTIAL HYPERTENSION: ICD-10-CM

## 2024-11-25 DIAGNOSIS — E66.3 OVERWEIGHT (BMI 25.0-29.9): ICD-10-CM

## 2024-11-25 DIAGNOSIS — E78.2 MIXED HYPERLIPIDEMIA: ICD-10-CM

## 2024-11-25 DIAGNOSIS — K21.9 CHRONIC GERD: Primary | ICD-10-CM

## 2024-11-25 PROBLEM — E66.811 OBESITY (BMI 30.0-34.9): Status: RESOLVED | Noted: 2024-07-22 | Resolved: 2024-11-25

## 2024-11-25 PROBLEM — E66.01 SEVERE OBESITY (BMI 35.0-39.9) WITH COMORBIDITY: Status: RESOLVED | Noted: 2019-10-16 | Resolved: 2024-11-25

## 2024-11-25 PROCEDURE — 3074F SYST BP LT 130 MM HG: CPT | Performed by: NURSE PRACTITIONER

## 2024-11-25 PROCEDURE — 1036F TOBACCO NON-USER: CPT | Performed by: NURSE PRACTITIONER

## 2024-11-25 PROCEDURE — G8484 FLU IMMUNIZE NO ADMIN: HCPCS | Performed by: NURSE PRACTITIONER

## 2024-11-25 PROCEDURE — G8427 DOCREV CUR MEDS BY ELIG CLIN: HCPCS | Performed by: NURSE PRACTITIONER

## 2024-11-25 PROCEDURE — 3079F DIAST BP 80-89 MM HG: CPT | Performed by: NURSE PRACTITIONER

## 2024-11-25 PROCEDURE — 99214 OFFICE O/P EST MOD 30 MIN: CPT | Performed by: NURSE PRACTITIONER

## 2024-11-25 PROCEDURE — G8417 CALC BMI ABV UP PARAM F/U: HCPCS | Performed by: NURSE PRACTITIONER

## 2024-11-25 ASSESSMENT — ENCOUNTER SYMPTOMS
EYES NEGATIVE: 1
ALLERGIC/IMMUNOLOGIC NEGATIVE: 1
COUGH: 0
SHORTNESS OF BREATH: 0
RESPIRATORY NEGATIVE: 1
GASTROINTESTINAL NEGATIVE: 1
BACK PAIN: 1

## 2024-11-25 NOTE — PROGRESS NOTES
Leslie Davis lost 14 lbs over 2 months.  Pt states that her goal weight is 140 lbs.  Pt has been following weight watchers diet.         Breakfast: 1/2 cup of Gnosticist oats with fruit or greek yogurt with frozen fruit     Snack: none     Lunch: cottage cheese bowls-1.5 cups of cottage cheese with diced chicken with Ragu simply sauce and fat free mozzarella cheese or taco meat with cottage cheese with single serve guacamole     Snack: dannon light and fit-caramel apple pie flavor or apple with yogurt     Dinner: ground beef or ground turkey or chicken with non-starchy vegetables or chicken with ranch dry dressing and oven roasted vegetables-green beans      Snack: cup of greek yogurt with SF cheesecake mixed together     Fluids:  water-64 oz./day or hot tea     Is pt consuming smaller portions?  Yes     Is pt consuming at least 64 oz of fluids per day?  yes    Is pt consuming carbonated, caffeinated, or sugary beverages? no    Exercise: walking daily for 45 mins. Daily + 6,500 steps      Plan/Recommendations: Continue with diet and exercise     Handouts: none     Araceli Carnes, RD, LD   
dietary and lifestyle modifications per our recommendations.  [x] Weight loss recommended.  [x] Reviewed the importance of checking blood pressure.  [x] Continue to follow up with their PCP for medication management (Diovan-HCT) and monitoring.  [] Follow up labs ordered today.    Hyperlipidemia:   [x] Continue to make dietary and lifestyle modifications per our recommendations.  [x] Weight loss recommended.  [] Continue to follow up with their PCP for medication management and monitoring.  [] Follow up labs ordered today.    Chronic GERD:   [x] Continue to make dietary and lifestyle modifications per our recommendations.  [x] Weight loss recommended.  [] Continue PPI.  [] Continue H2 Blocker  [] Wean PPI. Take every other day for two weeks. If no issues with heartburn/reflux you may decrease to every third day for two weeks. If no issues with heartburn/reflux you may stop the Prilosec. Recommend that you get OTC Pepcid to take should you have occasional heartburn/reflux.    Obesity:  [x] Continue to make dietary and lifestyle modifications per our recommendations.  [x] Return for follow-up 2 months.    Total encounter time: 31 minutes, including any number of the following: Bariatric medical protocols, review of labs, imaging, provider notes, outside hospital records, performing examination/evaluation, counseling patient and/or family, ordering medications/tests, placing referrals and communication with referring physicians, coordination of care; discussing exercise and physical activity; discussing dietary plan/recall with the patient as well with registered dietitian and documentation in the EHR. Of note, the above was done during same day of the actual patient encounter.

## 2024-12-03 ENCOUNTER — PATIENT MESSAGE (OUTPATIENT)
Dept: PSYCHIATRY | Age: 49
End: 2024-12-03

## 2024-12-03 DIAGNOSIS — F90.0 ATTENTION DEFICIT HYPERACTIVITY DISORDER (ADHD), PREDOMINANTLY INATTENTIVE TYPE: Primary | ICD-10-CM

## 2024-12-03 RX ORDER — DEXMETHYLPHENIDATE HYDROCHLORIDE 10 MG/1
10 CAPSULE, EXTENDED RELEASE ORAL DAILY
Qty: 14 CAPSULE | Refills: 0 | Status: SHIPPED | OUTPATIENT
Start: 2024-12-06 | End: 2024-12-20

## 2024-12-15 ENCOUNTER — PATIENT MESSAGE (OUTPATIENT)
Dept: PSYCHIATRY | Age: 49
End: 2024-12-15

## 2024-12-15 DIAGNOSIS — F90.0 ATTENTION DEFICIT HYPERACTIVITY DISORDER (ADHD), PREDOMINANTLY INATTENTIVE TYPE: Primary | ICD-10-CM

## 2024-12-16 RX ORDER — METHYLPHENIDATE HYDROCHLORIDE 5 MG/1
TABLET ORAL
Qty: 45 TABLET | Refills: 0 | Status: SHIPPED | OUTPATIENT
Start: 2024-12-20 | End: 2025-01-19

## 2024-12-28 NOTE — PATIENT INSTRUCTIONS
Key dietary points:     You should be eating protein at every meal and snack.  Protein is typically found in animal sources, i.e. chicken, lean beef, lean pork, fish, seafood and eggs. It is also found in low-fat dairy sources such as skim or 1% milk, low-fat yogurt, low-fat cheese, and low-fat cottage cheese. Plant based sources of protein include peanut butter, beans, nuts, seeds, hummus and soy.    Meats (preferably organic or grass fed) are great sources of protein and have no carbohydrates.    It is suggested to use coconut, olive, avocado, or almond oils. Choose vegetables that grow above ground as they are generally lower in carbohydrates and higher in fiber.    Avoid starches such as bread, rice, potatoes, pasta and all sources of simple sugars (desserts, soda, breakfast cereals).    Choose beverages that are low in calories and sugar.  You should be drinking 64 ounces of low calorie (5 calories or less per serving) fluids per day. Suggestions include:  Water (you may add fresh fruit, lemon, lime, cucumber or mint)  Crystal Light  [place] Liquid Water Enhancer  Propel Zero  Powerade Zero/Gatorade Zero  Isopure  [id number]  [id number]  SOBE Lifewater Zero  Vitamin Water Zero  Sugar Free Rafael-Aid    You should be eating 4-5 times per day.  Three small meals plus 1-2 snacks per day is your goal. This balances your calories and nutrients evenly throughout the day and helps to boost your metabolism. Refer to the snack list provided at your initial visit.     You should be utilizing the 9-inch plate method.  Eating on a smaller plate will help you control portion sizes, but what you put on your plate counts. Make ¼ of your plate lean protein, ¼ carbohydrate (fruit or dairy) and ½ the plate non-starchy vegetables.    You should be reducing added fat and sugar in your diet.  Frying foods adds too much fat and calories, but you could use an air fryer as it requires significantly less oil. Baking, broiling, or grilling meats

## 2025-01-16 DIAGNOSIS — F90.0 ATTENTION DEFICIT HYPERACTIVITY DISORDER (ADHD), PREDOMINANTLY INATTENTIVE TYPE: ICD-10-CM

## 2025-01-17 RX ORDER — METHYLPHENIDATE HYDROCHLORIDE 5 MG/1
TABLET ORAL
Qty: 45 TABLET | Refills: 0 | Status: SHIPPED | OUTPATIENT
Start: 2025-01-20 | End: 2025-02-19

## 2025-01-20 ENCOUNTER — OFFICE VISIT (OUTPATIENT)
Dept: BARIATRICS/WEIGHT MGMT | Age: 50
End: 2025-01-20
Payer: OTHER GOVERNMENT

## 2025-01-20 VITALS
HEIGHT: 62 IN | WEIGHT: 150 LBS | RESPIRATION RATE: 18 BRPM | BODY MASS INDEX: 27.6 KG/M2 | HEART RATE: 91 BPM | DIASTOLIC BLOOD PRESSURE: 81 MMHG | OXYGEN SATURATION: 98 % | SYSTOLIC BLOOD PRESSURE: 122 MMHG

## 2025-01-20 DIAGNOSIS — K21.9 CHRONIC GERD: Primary | ICD-10-CM

## 2025-01-20 DIAGNOSIS — E66.3 OVERWEIGHT (BMI 25.0-29.9): ICD-10-CM

## 2025-01-20 DIAGNOSIS — I10 ESSENTIAL HYPERTENSION: ICD-10-CM

## 2025-01-20 DIAGNOSIS — E78.2 MIXED HYPERLIPIDEMIA: ICD-10-CM

## 2025-01-20 PROCEDURE — G8417 CALC BMI ABV UP PARAM F/U: HCPCS | Performed by: NURSE PRACTITIONER

## 2025-01-20 PROCEDURE — 3079F DIAST BP 80-89 MM HG: CPT | Performed by: NURSE PRACTITIONER

## 2025-01-20 PROCEDURE — 99214 OFFICE O/P EST MOD 30 MIN: CPT | Performed by: NURSE PRACTITIONER

## 2025-01-20 PROCEDURE — 3074F SYST BP LT 130 MM HG: CPT | Performed by: NURSE PRACTITIONER

## 2025-01-20 PROCEDURE — G8427 DOCREV CUR MEDS BY ELIG CLIN: HCPCS | Performed by: NURSE PRACTITIONER

## 2025-01-20 PROCEDURE — 1036F TOBACCO NON-USER: CPT | Performed by: NURSE PRACTITIONER

## 2025-01-20 NOTE — PROGRESS NOTES
Leslie Davis lost 4 lbs over 2 mos.    Treatment plan details: 1200 kcal LCMP  Is patient adhering to treatment plan: yes      Is pt eating 4-5 times each day? yes   Tracks with weight watchers.  B: 2 eggs + juan j + 2 pieces seed carbmaster toast + spray butter  L: 1.5 cups CC + coleslaw/brocc slaw + diced chicken   S: apple + oikos triple zero 1 cup + 1 tsp cranberry/nuts  D: chx + fajitas - usually skips tortilla, but may do LC + ff cheese   S: plain greek yogurt + cheesecake sf jello     Is pt including lean protein with all meals and snacks? yes      Is pt avoiding added sugars and starchy foods? yes      Consuming at least 64oz of calorie free fluids?  72oz water    Participating in intentional exercise?  Walking pad, typically gets outside 6000 steps per day     Plan/Goals:   - Goal to add pilates     Handouts: none     RUBY MORALES, MS, RD, LD  
(Diovan-HCT) and monitoring.  [] Follow up labs ordered today.    Hyperlipidemia:   [x] Continue to make dietary and lifestyle modifications per our recommendations.  [x] Weight loss recommended.  [] Continue to follow up with their PCP for medication management and monitoring.  [] Follow up labs ordered today.    Chronic GERD:   [x] Continue to make dietary and lifestyle modifications per our recommendations.  [x] Weight loss recommended.  [] Continue PPI.  [] Continue H2 Blocker  [] Wean PPI. Take every other day for two weeks. If no issues with heartburn/reflux you may decrease to every third day for two weeks. If no issues with heartburn/reflux you may stop the Prilosec. Recommend that you get OTC Pepcid to take should you have occasional heartburn/reflux.    Overweight:  [x] Continue to make dietary and lifestyle modifications per our recommendations.  [x] Return for follow-up PRN.    Total encounter time: 31 minutes, including any number of the following: Bariatric medical protocols, review of labs, imaging, provider notes, outside hospital records, performing examination/evaluation, counseling patient and/or family, ordering medications/tests, placing referrals and communication with referring physicians, coordination of care; discussing exercise and physical activity; discussing dietary plan/recall with the patient as well with registered dietitian and documentation in the EHR. Of note, the above was done during same day of the actual patient encounter.

## 2025-01-22 LAB
C-REACTIVE PROTEIN WIDE RANGE: 3.2 MG/L (ref 0–5)
HCT VFR BLD CALC: 41 % (ref 35–46)
HEMOGLOBIN: 13.3 G/DL (ref 11.7–15.5)
LEUKOCYTES, BLD: 5.39 10*3/UL (ref 3.8–10.8)
MCH RBC QN AUTO: 29.7 PG (ref 27–33)
MCHC RBC AUTO-ENTMCNC: 32.4 G/DL (ref 30–36)
MCV RBC AUTO: 91.5 FL (ref 80–100)
PDW BLD-RTO: 12.1 % (ref 11–15)
PLATELET # BLD: 228 10*3/UL (ref 140–400)
PMV BLD AUTO: 12.3 FL (ref 9–13)
RBC # BLD: 4.48 10*6/UL (ref 3.8–5.1)
RHEUMATOID FACTOR: <15 IU/ML (ref 0–14)
SED RATE, AUTOMATED: 4 MM/HR (ref 0–20)
URIC ACID: 4 MG/DL (ref 2.6–6)

## 2025-01-24 LAB — ANTI-NUCLEAR ANTIBODY (ANA): NEGATIVE

## 2025-01-26 NOTE — PROGRESS NOTES
Nightshift reported that pt was having positive orthostatics last night and would feel light headed, tingling in fingers and ringing in ears when standing. Pt was assisted to bathroom in wheelchair and then orthostatics also done and pt was starting to feel dizzy with standing and needed to sit done. Dr. Ava Inman called and left voice message for him to clarify what BP meds he would like me to give if any. Pt also C/o of 10 out of 10 pain and routine pain medication given. Incision site to back CDI, DAVID, no s/s of infection noted. Pt remains NWB to L arm. Call light and bedside table within reach.  Chair alarm on 1

## 2025-01-27 ENCOUNTER — OFFICE VISIT (OUTPATIENT)
Dept: PSYCHIATRY | Age: 50
End: 2025-01-27
Payer: OTHER GOVERNMENT

## 2025-01-27 VITALS
BODY MASS INDEX: 27.68 KG/M2 | SYSTOLIC BLOOD PRESSURE: 122 MMHG | WEIGHT: 150.4 LBS | DIASTOLIC BLOOD PRESSURE: 80 MMHG | HEIGHT: 62 IN | HEART RATE: 86 BPM

## 2025-01-27 DIAGNOSIS — F33.42 RECURRENT MAJOR DEPRESSIVE DISORDER, IN FULL REMISSION (HCC): Primary | ICD-10-CM

## 2025-01-27 DIAGNOSIS — F90.0 ATTENTION DEFICIT HYPERACTIVITY DISORDER (ADHD), PREDOMINANTLY INATTENTIVE TYPE: ICD-10-CM

## 2025-01-27 LAB — HLA-B27: NEGATIVE

## 2025-01-27 PROCEDURE — G8427 DOCREV CUR MEDS BY ELIG CLIN: HCPCS | Performed by: PSYCHIATRY & NEUROLOGY

## 2025-01-27 PROCEDURE — 3079F DIAST BP 80-89 MM HG: CPT | Performed by: PSYCHIATRY & NEUROLOGY

## 2025-01-27 PROCEDURE — 3074F SYST BP LT 130 MM HG: CPT | Performed by: PSYCHIATRY & NEUROLOGY

## 2025-01-27 PROCEDURE — G8417 CALC BMI ABV UP PARAM F/U: HCPCS | Performed by: PSYCHIATRY & NEUROLOGY

## 2025-01-27 PROCEDURE — 1036F TOBACCO NON-USER: CPT | Performed by: PSYCHIATRY & NEUROLOGY

## 2025-01-27 PROCEDURE — 99214 OFFICE O/P EST MOD 30 MIN: CPT | Performed by: PSYCHIATRY & NEUROLOGY

## 2025-01-27 RX ORDER — TRAZODONE HYDROCHLORIDE 50 MG/1
50 TABLET, FILM COATED ORAL NIGHTLY
Qty: 90 TABLET | Refills: 1 | Status: SHIPPED | OUTPATIENT
Start: 2025-01-27

## 2025-01-27 RX ORDER — METHYLPHENIDATE HYDROCHLORIDE 5 MG/1
5 TABLET ORAL 2 TIMES DAILY
Qty: 60 TABLET | Refills: 0 | Status: SHIPPED | OUTPATIENT
Start: 2025-02-13 | End: 2025-03-15

## 2025-01-27 ASSESSMENT — ANXIETY QUESTIONNAIRES
1. FEELING NERVOUS, ANXIOUS, OR ON EDGE: NOT AT ALL
2. NOT BEING ABLE TO STOP OR CONTROL WORRYING: NOT AT ALL
4. TROUBLE RELAXING: MORE THAN HALF THE DAYS
GAD7 TOTAL SCORE: 4
3. WORRYING TOO MUCH ABOUT DIFFERENT THINGS: NOT AT ALL
6. BECOMING EASILY ANNOYED OR IRRITABLE: SEVERAL DAYS
5. BEING SO RESTLESS THAT IT IS HARD TO SIT STILL: SEVERAL DAYS
IF YOU CHECKED OFF ANY PROBLEMS ON THIS QUESTIONNAIRE, HOW DIFFICULT HAVE THESE PROBLEMS MADE IT FOR YOU TO DO YOUR WORK, TAKE CARE OF THINGS AT HOME, OR GET ALONG WITH OTHER PEOPLE: SOMEWHAT DIFFICULT
7. FEELING AFRAID AS IF SOMETHING AWFUL MIGHT HAPPEN: NOT AT ALL

## 2025-01-27 ASSESSMENT — PATIENT HEALTH QUESTIONNAIRE - PHQ9
SUM OF ALL RESPONSES TO PHQ QUESTIONS 1-9: 9
4. FEELING TIRED OR HAVING LITTLE ENERGY: SEVERAL DAYS
7. TROUBLE CONCENTRATING ON THINGS, SUCH AS READING THE NEWSPAPER OR WATCHING TELEVISION: NEARLY EVERY DAY
SUM OF ALL RESPONSES TO PHQ QUESTIONS 1-9: 9
9. THOUGHTS THAT YOU WOULD BE BETTER OFF DEAD, OR OF HURTING YOURSELF: NOT AT ALL
2. FEELING DOWN, DEPRESSED OR HOPELESS: SEVERAL DAYS
10. IF YOU CHECKED OFF ANY PROBLEMS, HOW DIFFICULT HAVE THESE PROBLEMS MADE IT FOR YOU TO DO YOUR WORK, TAKE CARE OF THINGS AT HOME, OR GET ALONG WITH OTHER PEOPLE: SOMEWHAT DIFFICULT
SUM OF ALL RESPONSES TO PHQ9 QUESTIONS 1 & 2: 2
SUM OF ALL RESPONSES TO PHQ QUESTIONS 1-9: 9
1. LITTLE INTEREST OR PLEASURE IN DOING THINGS: SEVERAL DAYS
3. TROUBLE FALLING OR STAYING ASLEEP: MORE THAN HALF THE DAYS
SUM OF ALL RESPONSES TO PHQ QUESTIONS 1-9: 9
6. FEELING BAD ABOUT YOURSELF - OR THAT YOU ARE A FAILURE OR HAVE LET YOURSELF OR YOUR FAMILY DOWN: NOT AT ALL
8. MOVING OR SPEAKING SO SLOWLY THAT OTHER PEOPLE COULD HAVE NOTICED. OR THE OPPOSITE, BEING SO FIGETY OR RESTLESS THAT YOU HAVE BEEN MOVING AROUND A LOT MORE THAN USUAL: NOT AT ALL
5. POOR APPETITE OR OVEREATING: SEVERAL DAYS

## 2025-01-27 NOTE — PATIENT INSTRUCTIONS
Consider the following referral for your son for psychological testing:    [facility]  [address], [place] [address]  Phone: [phone]  Fax: [phone]

## 2025-01-27 NOTE — PROGRESS NOTES
son's psych provider.     Follow-up: RTC in 2 months    I spent 30 min this visit including face to face time, chart review, documentation and orders    Jimmy Oviedo MD  Psychiatrist

## 2025-02-12 ENCOUNTER — PATIENT MESSAGE (OUTPATIENT)
Dept: PSYCHIATRY | Age: 50
End: 2025-02-12

## 2025-02-12 DIAGNOSIS — F90.0 ATTENTION DEFICIT HYPERACTIVITY DISORDER (ADHD), PREDOMINANTLY INATTENTIVE TYPE: ICD-10-CM

## 2025-02-13 RX ORDER — METHYLPHENIDATE HYDROCHLORIDE 5 MG/1
TABLET ORAL
Qty: 45 TABLET | Refills: 0 | Status: SHIPPED | OUTPATIENT
Start: 2025-02-13 | End: 2025-03-15

## 2025-03-20 ENCOUNTER — OFFICE VISIT (OUTPATIENT)
Dept: INTERNAL MEDICINE CLINIC | Age: 50
End: 2025-03-20
Payer: OTHER GOVERNMENT

## 2025-03-20 VITALS
BODY MASS INDEX: 27.46 KG/M2 | WEIGHT: 149.2 LBS | TEMPERATURE: 98.2 F | DIASTOLIC BLOOD PRESSURE: 80 MMHG | OXYGEN SATURATION: 99 % | SYSTOLIC BLOOD PRESSURE: 124 MMHG | HEART RATE: 92 BPM | HEIGHT: 62 IN

## 2025-03-20 DIAGNOSIS — G89.29 CHRONIC MIDLINE THORACIC BACK PAIN: Primary | ICD-10-CM

## 2025-03-20 DIAGNOSIS — M54.6 CHRONIC MIDLINE THORACIC BACK PAIN: Primary | ICD-10-CM

## 2025-03-20 PROCEDURE — G8427 DOCREV CUR MEDS BY ELIG CLIN: HCPCS

## 2025-03-20 PROCEDURE — 1036F TOBACCO NON-USER: CPT

## 2025-03-20 PROCEDURE — G8417 CALC BMI ABV UP PARAM F/U: HCPCS

## 2025-03-20 PROCEDURE — 3079F DIAST BP 80-89 MM HG: CPT

## 2025-03-20 PROCEDURE — 99213 OFFICE O/P EST LOW 20 MIN: CPT

## 2025-03-20 PROCEDURE — 3074F SYST BP LT 130 MM HG: CPT

## 2025-03-20 RX ORDER — TRAMADOL HYDROCHLORIDE 50 MG/1
50 TABLET ORAL EVERY 8 HOURS PRN
Qty: 21 TABLET | Refills: 0 | Status: SHIPPED | OUTPATIENT
Start: 2025-03-20 | End: 2025-03-27

## 2025-03-20 SDOH — ECONOMIC STABILITY: FOOD INSECURITY: WITHIN THE PAST 12 MONTHS, THE FOOD YOU BOUGHT JUST DIDN'T LAST AND YOU DIDN'T HAVE MONEY TO GET MORE.: NEVER TRUE

## 2025-03-20 SDOH — ECONOMIC STABILITY: FOOD INSECURITY: WITHIN THE PAST 12 MONTHS, YOU WORRIED THAT YOUR FOOD WOULD RUN OUT BEFORE YOU GOT MONEY TO BUY MORE.: NEVER TRUE

## 2025-03-20 NOTE — PROGRESS NOTES
Leslie Davis (:  1975) is a 49 y.o. female,Established patient, here for evaluation of the following chief complaint(s):  Back Pain (Chronic back pain)      Assessment & Plan   ASSESSMENT/PLAN:  Assessment & Plan  Chronic midline thoracic back pain  Presents today for acute visit.  States that she has been having chronic back pain related to her injuries and extensive surgeries of her back in .  Has been following pain management and back surgeon.  Her pain medications are routinely filled by her pain management doctor however they are out of town, as she is unable to get her prescription refilled.  Her PCP is also not in office until next week. Previous CT as below.  PDMP reviewed, appropriate    Denies headaches, lightheadedness/dizziness, vertigo or LOC.  Denies any vision changes, painful EOM, earache, tinnitus, hearing loss, sinus pressure, congestion, sore throat, or difficulty swallowing.   Also denies any neck stiffness/neck pain, facial drooping, slurring of speech, new weakness/numbness, paresthesias, paralysis, seizures, episodes of confusion, memory loss, disorientation, tremors, or syncopal episodes. Denies any gait abnormalities, imbalance, stumbling, clumsiness, dropping objects.     Vitals are stable.  On examination has mid thoracic/lower thoracic pain on palpation of mid spine.  Denies any new neurological symptoms.  Has chronic right lower extremity paresthesias, but otherwise denies any weakness, gait instability, saddle paresthesia, urinary or bowel incontinence.  Have refilled her tramadol for 7 days.  Advised to follow-up with her pain management doctor when they return next week.  Denies any other concerns today.      CT lumbar spine 2021:  FINDINGS:  BONES/ALIGNMENT: There is a chronic T7 superior endplate Schmorl's node.  There is posterior fusion from L1-L3 with rods and pedicle screws.  There is  a partial corpectomy at L2 with expandable cage.  There have

## 2025-04-07 ENCOUNTER — OFFICE VISIT (OUTPATIENT)
Dept: PSYCHIATRY | Age: 50
End: 2025-04-07
Payer: OTHER GOVERNMENT

## 2025-04-07 VITALS
SYSTOLIC BLOOD PRESSURE: 118 MMHG | HEIGHT: 61 IN | WEIGHT: 143.2 LBS | DIASTOLIC BLOOD PRESSURE: 90 MMHG | BODY MASS INDEX: 27.03 KG/M2 | HEART RATE: 94 BPM

## 2025-04-07 DIAGNOSIS — F90.0 ATTENTION DEFICIT HYPERACTIVITY DISORDER (ADHD), PREDOMINANTLY INATTENTIVE TYPE: Primary | ICD-10-CM

## 2025-04-07 DIAGNOSIS — F33.42 RECURRENT MAJOR DEPRESSIVE DISORDER, IN FULL REMISSION: ICD-10-CM

## 2025-04-07 PROCEDURE — G8427 DOCREV CUR MEDS BY ELIG CLIN: HCPCS | Performed by: PSYCHIATRY & NEUROLOGY

## 2025-04-07 PROCEDURE — 99214 OFFICE O/P EST MOD 30 MIN: CPT | Performed by: PSYCHIATRY & NEUROLOGY

## 2025-04-07 PROCEDURE — 3080F DIAST BP >= 90 MM HG: CPT | Performed by: PSYCHIATRY & NEUROLOGY

## 2025-04-07 PROCEDURE — 3074F SYST BP LT 130 MM HG: CPT | Performed by: PSYCHIATRY & NEUROLOGY

## 2025-04-07 PROCEDURE — G8417 CALC BMI ABV UP PARAM F/U: HCPCS | Performed by: PSYCHIATRY & NEUROLOGY

## 2025-04-07 PROCEDURE — 1036F TOBACCO NON-USER: CPT | Performed by: PSYCHIATRY & NEUROLOGY

## 2025-04-07 RX ORDER — METHYLPHENIDATE HYDROCHLORIDE 5 MG/1
TABLET ORAL
Qty: 45 TABLET | Refills: 0 | Status: SHIPPED | OUTPATIENT
Start: 2025-04-23 | End: 2025-05-23

## 2025-04-07 ASSESSMENT — PATIENT HEALTH QUESTIONNAIRE - PHQ9
8. MOVING OR SPEAKING SO SLOWLY THAT OTHER PEOPLE COULD HAVE NOTICED. OR THE OPPOSITE, BEING SO FIGETY OR RESTLESS THAT YOU HAVE BEEN MOVING AROUND A LOT MORE THAN USUAL: NOT AT ALL
7. TROUBLE CONCENTRATING ON THINGS, SUCH AS READING THE NEWSPAPER OR WATCHING TELEVISION: SEVERAL DAYS
SUM OF ALL RESPONSES TO PHQ QUESTIONS 1-9: 2
10. IF YOU CHECKED OFF ANY PROBLEMS, HOW DIFFICULT HAVE THESE PROBLEMS MADE IT FOR YOU TO DO YOUR WORK, TAKE CARE OF THINGS AT HOME, OR GET ALONG WITH OTHER PEOPLE: SOMEWHAT DIFFICULT
SUM OF ALL RESPONSES TO PHQ QUESTIONS 1-9: 2
1. LITTLE INTEREST OR PLEASURE IN DOING THINGS: SEVERAL DAYS
5. POOR APPETITE OR OVEREATING: NOT AT ALL
2. FEELING DOWN, DEPRESSED OR HOPELESS: NOT AT ALL
9. THOUGHTS THAT YOU WOULD BE BETTER OFF DEAD, OR OF HURTING YOURSELF: NOT AT ALL
SUM OF ALL RESPONSES TO PHQ QUESTIONS 1-9: 2
3. TROUBLE FALLING OR STAYING ASLEEP: NOT AT ALL
SUM OF ALL RESPONSES TO PHQ QUESTIONS 1-9: 2
4. FEELING TIRED OR HAVING LITTLE ENERGY: NOT AT ALL
6. FEELING BAD ABOUT YOURSELF - OR THAT YOU ARE A FAILURE OR HAVE LET YOURSELF OR YOUR FAMILY DOWN: NOT AT ALL

## 2025-04-07 ASSESSMENT — ANXIETY QUESTIONNAIRES
IF YOU CHECKED OFF ANY PROBLEMS ON THIS QUESTIONNAIRE, HOW DIFFICULT HAVE THESE PROBLEMS MADE IT FOR YOU TO DO YOUR WORK, TAKE CARE OF THINGS AT HOME, OR GET ALONG WITH OTHER PEOPLE: SOMEWHAT DIFFICULT
3. WORRYING TOO MUCH ABOUT DIFFERENT THINGS: SEVERAL DAYS
2. NOT BEING ABLE TO STOP OR CONTROL WORRYING: SEVERAL DAYS
5. BEING SO RESTLESS THAT IT IS HARD TO SIT STILL: SEVERAL DAYS
6. BECOMING EASILY ANNOYED OR IRRITABLE: SEVERAL DAYS
4. TROUBLE RELAXING: SEVERAL DAYS
7. FEELING AFRAID AS IF SOMETHING AWFUL MIGHT HAPPEN: NOT AT ALL
GAD7 TOTAL SCORE: 6
1. FEELING NERVOUS, ANXIOUS, OR ON EDGE: SEVERAL DAYS

## 2025-04-07 NOTE — PROGRESS NOTES
PSYCHIATRY PROGRESS NOTE        Leslie Davis  1975  04/07/2025  PCP: Farida Madrigal, APRN - CNP    CC:   Chief Complaint   Patient presents with    Follow-up     S:   Overall patient is doing well. She continues to maintain her diet and exercise goals and has continued to lose weight. She has been finding the ritalin at the current dose tolerable and helpful for her to follow through with tasks and maintain focus. She still makes lists and feels very reliant on this, as well as tracking of her activity and calories which she feels sometimes she can obsesses about but it has been helpful for her to keep her goals.   Patent's daughter was in a residential facility for 2 months due to her mental health issues, she worries about her as she just got out. One of her dogs has been getting more ill and likely has cancer, and she is putting him down today which is difficult emotionally.     ROS: +weight loss (intentional)    Brief Psych Hx:  Hosp: [facility] 2014, [facility] in 2016  Diagnoses: depression, anxiety  Meds: Wellbutrin (at first somewhat helpful), duloxetine (since 2015, helps joint pain), restoril, clonazepam. Prazosin. Early 20's tried sertraline ? Not helpful despite taking for a couple yrs. atomoxetine (somewhat helpful at 60mg)  [facility]pt: [facility] [place] prior to 2012, [facility] NP through [facility]. Was seeing a therapist there as well but therapist left the practice.   Suicide Attempts: twice (2014 - OD on medications, 2015 OD on medications)     Current psychiatric medications:  Fetzima 80mg daily  Ritalin 5mg / 2.5mg   Trazodone 50mg qhs prn sleep    O:  Vitals:    04/07/25 0837   BP: (!) 118/90   Pulse: 94     Wt Readings from Last 3 Encounters:   04/07/25 65 kg (143 lb 3.2 oz)   03/20/25 67.7 kg (149 lb 3.2 oz)   01/27/25 68.2 kg (150 lb 6.4 oz)            4/7/2025     8:40 AM 1/27/2025    11:11 AM 11/18/2024    11:08 AM 9/20/2024    11:01 AM 6/19/2024     9:10 AM

## 2025-04-14 ENCOUNTER — RESULTS FOLLOW-UP (OUTPATIENT)
Dept: INTERNAL MEDICINE CLINIC | Age: 50
End: 2025-04-14

## 2025-04-14 ENCOUNTER — HOSPITAL ENCOUNTER (OUTPATIENT)
Dept: WOMENS IMAGING | Age: 50
Discharge: HOME | End: 2025-04-14
Payer: OTHER GOVERNMENT

## 2025-04-14 VITALS — HEIGHT: 62 IN | WEIGHT: 143 LBS | BODY MASS INDEX: 26.31 KG/M2

## 2025-04-14 DIAGNOSIS — Z12.31 VISIT FOR SCREENING MAMMOGRAM: ICD-10-CM

## 2025-04-14 PROCEDURE — 77063 BREAST TOMOSYNTHESIS BI: CPT

## 2025-04-28 ENCOUNTER — TELEPHONE (OUTPATIENT)
Dept: INTERNAL MEDICINE CLINIC | Age: 50
End: 2025-04-28

## 2025-04-28 ENCOUNTER — OFFICE VISIT (OUTPATIENT)
Dept: INTERNAL MEDICINE CLINIC | Age: 50
End: 2025-04-28
Payer: OTHER GOVERNMENT

## 2025-04-28 VITALS
SYSTOLIC BLOOD PRESSURE: 120 MMHG | DIASTOLIC BLOOD PRESSURE: 72 MMHG | BODY MASS INDEX: 26.68 KG/M2 | WEIGHT: 145 LBS | HEART RATE: 86 BPM | HEIGHT: 62 IN | OXYGEN SATURATION: 99 %

## 2025-04-28 DIAGNOSIS — E78.2 MIXED HYPERLIPIDEMIA: ICD-10-CM

## 2025-04-28 DIAGNOSIS — Z12.11 COLON CANCER SCREENING: ICD-10-CM

## 2025-04-28 DIAGNOSIS — I10 ESSENTIAL HYPERTENSION: ICD-10-CM

## 2025-04-28 DIAGNOSIS — R73.09 ELEVATED GLUCOSE: ICD-10-CM

## 2025-04-28 DIAGNOSIS — M25.512 CHRONIC LEFT SHOULDER PAIN: ICD-10-CM

## 2025-04-28 DIAGNOSIS — G56.01 CARPAL TUNNEL SYNDROME OF RIGHT WRIST: ICD-10-CM

## 2025-04-28 DIAGNOSIS — K59.00 CONSTIPATION, UNSPECIFIED CONSTIPATION TYPE: ICD-10-CM

## 2025-04-28 DIAGNOSIS — F33.42 RECURRENT MAJOR DEPRESSIVE DISORDER, IN FULL REMISSION: ICD-10-CM

## 2025-04-28 DIAGNOSIS — E55.9 VITAMIN D DEFICIENCY: ICD-10-CM

## 2025-04-28 DIAGNOSIS — G89.29 CHRONIC MIDLINE THORACIC BACK PAIN: ICD-10-CM

## 2025-04-28 DIAGNOSIS — E66.3 OVERWEIGHT (BMI 25.0-29.9): ICD-10-CM

## 2025-04-28 DIAGNOSIS — Z00.00 ENCOUNTER FOR WELL ADULT EXAM WITHOUT ABNORMAL FINDINGS: Primary | ICD-10-CM

## 2025-04-28 DIAGNOSIS — M54.6 CHRONIC MIDLINE THORACIC BACK PAIN: ICD-10-CM

## 2025-04-28 DIAGNOSIS — Z78.9 TAKES DIETARY SUPPLEMENTS: ICD-10-CM

## 2025-04-28 DIAGNOSIS — F90.0 ATTENTION DEFICIT HYPERACTIVITY DISORDER (ADHD), PREDOMINANTLY INATTENTIVE TYPE: ICD-10-CM

## 2025-04-28 DIAGNOSIS — E53.8 VITAMIN B12 DEFICIENCY: ICD-10-CM

## 2025-04-28 DIAGNOSIS — G89.29 CHRONIC LEFT SHOULDER PAIN: ICD-10-CM

## 2025-04-28 DIAGNOSIS — F41.9 ANXIETY: ICD-10-CM

## 2025-04-28 PROBLEM — R63.5 WEIGHT GAIN: Status: RESOLVED | Noted: 2024-03-19 | Resolved: 2025-04-28

## 2025-04-28 PROBLEM — F33.41 RECURRENT MAJOR DEPRESSIVE DISORDER, IN PARTIAL REMISSION: Status: RESOLVED | Noted: 2019-06-05 | Resolved: 2025-04-28

## 2025-04-28 PROCEDURE — 3074F SYST BP LT 130 MM HG: CPT | Performed by: NURSE PRACTITIONER

## 2025-04-28 PROCEDURE — 99396 PREV VISIT EST AGE 40-64: CPT | Performed by: NURSE PRACTITIONER

## 2025-04-28 PROCEDURE — 3078F DIAST BP <80 MM HG: CPT | Performed by: NURSE PRACTITIONER

## 2025-04-28 RX ORDER — CYANOCOBALAMIN 1000 UG/ML
INJECTION, SOLUTION INTRAMUSCULAR; SUBCUTANEOUS
Qty: 1 ML | Refills: 11 | Status: SHIPPED | OUTPATIENT
Start: 2025-04-28 | End: 2025-04-28

## 2025-04-28 RX ORDER — CYANOCOBALAMIN 1000 UG/ML
INJECTION, SOLUTION INTRAMUSCULAR; SUBCUTANEOUS
Qty: 1 ML | Refills: 11 | Status: SHIPPED | OUTPATIENT
Start: 2025-04-28

## 2025-04-28 ASSESSMENT — ENCOUNTER SYMPTOMS
CHEST TIGHTNESS: 0
COUGH: 0
WHEEZING: 0
SHORTNESS OF BREATH: 0

## 2025-04-28 NOTE — ASSESSMENT & PLAN NOTE
Chronic overall stable.  - Using gabapentin, methocarbamol and tramadol as prescribed by Dr. Sanchez for pain management.   - FU with Dr. Sanchez as scheduled.

## 2025-04-28 NOTE — ASSESSMENT & PLAN NOTE
Chronic, stable.    - On Ritalin 5 mg in the morning and 2.5 mg in the afternoon  - Prefers current regimen despite past adverse reactions to other medications  - Prescription filled by Dr. Oviedo last week   - Will transition prescription management to PCP and send refill requests through [facility]  - Next appointment with Dr. Oviedo in 3 months, followed by an appointment here in 6 months

## 2025-04-28 NOTE — TELEPHONE ENCOUNTER
[facility] calling for clarification on cyanocobalamin 1000 MCG/ML injection    INJECT ONE MILLILITER INJECTION DAILY FOR 30 DAYS, Disp-1 mL, R-11   Please call for clarification or resubmit corrected order

## 2025-04-28 NOTE — ASSESSMENT & PLAN NOTE
Chronic, stable.  Patient is doing well on Fetzima 80 mg once daily.  Patient will transition from psychiatry to primary care for medication refills.    Orders:    TSH reflex to FT4; Future

## 2025-04-28 NOTE — PROGRESS NOTES
4/28/25     Chief Complaint   Patient presents with   • Annual Exam     No Concerns   Not fasting        History of Present Illness  The patient presents for annual exam and management of multiple health concerns, including attention-deficit/hyperactivity disorder (ADHD), chronic pain, constipation, carpal tunnel syndrome, shoulder pain, and general health maintenance.    ADHD  - Currently managed with methylphenidate (Ritalin), administered as 5 mg in the morning and 2.5 mg in the afternoon.  - Approximately 75% symptom relief reported  - she had side effects with other medications in the past   - working on transitioning to PCP for medication management   - Follow-up appointments scheduled with Dr. Oviedo in three months    Chronic Pain  - Seeing Dr. Sanchez on Friday   - Difficulties in obtaining tramadol refills    - Pain management with gabapentin and methocarbamol has been less effective in the absence of tramadol the past few days   - increased pain significantly impairing daily functioning the past few days     Constipation  - Intermittent constipation remains problematic since changing her dietary habits around and adherence to a healthy diet.  - Magnesium supplementation providing relief      Carpal Tunnel Syndrome  - carpal tunnel syndrome symptoms affects the right wrist, with symptoms radiating to the elbow.  - An injection administered in March 2025 provided temporary relief.  - Symptoms are exacerbated by activities such as painting.  - Despite challenges, the patient maintains an active lifestyle, engaging in daily walking and resistance band exercises.  - she is seeing Dr. Abdiaziz Zavala with ortho, holding off on surgery at this time     Shoulder Pain, left   - Seeing Dr. Edwin Corrales with ortho at [facility]   - Has been treated with an injection and oral steroids  -  surgical intervention previously recommended but not pursued due to concerns about activity level after surgery    Supplemental information:

## 2025-04-28 NOTE — ASSESSMENT & PLAN NOTE
Chronic, controlled.  Continue valsartan-hydrochlorothiazide 160-25 mg daily.  Monitor BP at home and return if not at goal.    Orders:    Comprehensive Metabolic Panel; Future

## 2025-04-28 NOTE — ASSESSMENT & PLAN NOTE
Chronic, due for blood work.  Continue B12 injections.    Orders:    Vitamin B12 & Folate; Future    cyanocobalamin 1000 MCG/ML injection; INJECT ONE MILLILITER INJECTION DAILY FOR 30 DAYS    SYRINGE-NEEDLE, DISP, 3 ML 25G X 1\" 3 ML MISC; 1 applicator by Does not apply route every 30 days

## 2025-04-29 LAB
25(OH)D3 SERPL-MCNC: 41.6 NG/ML
ALBUMIN SERPL-MCNC: 4.5 G/DL (ref 3.4–5)
ALBUMIN/GLOB SERPL: 2.4 {RATIO} (ref 1.1–2.2)
ALP SERPL-CCNC: 77 U/L (ref 40–129)
ALT SERPL-CCNC: 26 U/L (ref 10–40)
ANION GAP SERPL CALCULATED.3IONS-SCNC: 10 MMOL/L (ref 3–16)
AST SERPL-CCNC: 25 U/L (ref 15–37)
BILIRUB SERPL-MCNC: <0.2 MG/DL (ref 0–1)
BUN SERPL-MCNC: 15 MG/DL (ref 7–20)
CALCIUM SERPL-MCNC: 9.6 MG/DL (ref 8.3–10.6)
CHLORIDE SERPL-SCNC: 104 MMOL/L (ref 99–110)
CHOLEST SERPL-MCNC: 165 MG/DL (ref 0–199)
CO2 SERPL-SCNC: 27 MMOL/L (ref 21–32)
CREAT SERPL-MCNC: 0.6 MG/DL (ref 0.6–1.1)
EST. AVERAGE GLUCOSE BLD GHB EST-MCNC: 105.4 MG/DL
FOLATE SERPL-MCNC: >40 NG/ML (ref 4.78–24.2)
GFR SERPLBLD CREATININE-BSD FMLA CKD-EPI: >90 ML/MIN/{1.73_M2}
GLUCOSE SERPL-MCNC: 76 MG/DL (ref 70–99)
HBA1C MFR BLD: 5.3 %
HDLC SERPL-MCNC: 44 MG/DL (ref 40–60)
LDLC SERPL CALC-MCNC: 97 MG/DL
MAGNESIUM SERPL-MCNC: 2.09 MG/DL (ref 1.8–2.4)
POTASSIUM SERPL-SCNC: 4.1 MMOL/L (ref 3.5–5.1)
PROT SERPL-MCNC: 6.4 G/DL (ref 6.4–8.2)
SODIUM SERPL-SCNC: 141 MMOL/L (ref 136–145)
TRIGL SERPL-MCNC: 120 MG/DL (ref 0–150)
TSH SERPL DL<=0.005 MIU/L-ACNC: 2.07 UIU/ML (ref 0.27–4.2)
VIT B12 SERPL-MCNC: 436 PG/ML (ref 211–911)
VLDLC SERPL CALC-MCNC: 24 MG/DL

## 2025-04-30 ENCOUNTER — RESULTS FOLLOW-UP (OUTPATIENT)
Dept: INTERNAL MEDICINE CLINIC | Age: 50
End: 2025-04-30

## 2025-05-11 DIAGNOSIS — I10 ESSENTIAL HYPERTENSION: ICD-10-CM

## 2025-05-12 RX ORDER — VALSARTAN AND HYDROCHLOROTHIAZIDE 160; 25 MG/1; MG/1
1 TABLET ORAL DAILY
Qty: 90 TABLET | Refills: 1 | Status: SHIPPED | OUTPATIENT
Start: 2025-05-12

## 2025-05-12 NOTE — TELEPHONE ENCOUNTER
Last OV: 4/28/2025  Next OV: 10/27/2025    Next appointment due: 10/28/2025    Last fill: 11/11/2024  Refills: 1

## 2025-05-27 ENCOUNTER — PATIENT MESSAGE (OUTPATIENT)
Dept: INTERNAL MEDICINE CLINIC | Age: 50
End: 2025-05-27

## 2025-05-27 DIAGNOSIS — F90.0 ATTENTION DEFICIT HYPERACTIVITY DISORDER (ADHD), PREDOMINANTLY INATTENTIVE TYPE: ICD-10-CM

## 2025-05-28 RX ORDER — METHYLPHENIDATE HYDROCHLORIDE 5 MG/1
TABLET ORAL
Qty: 45 TABLET | Refills: 0 | Status: SHIPPED | OUTPATIENT
Start: 2025-06-28 | End: 2025-07-28

## 2025-05-28 RX ORDER — METHYLPHENIDATE HYDROCHLORIDE 5 MG/1
TABLET ORAL
Qty: 45 TABLET | Refills: 0 | Status: SHIPPED | OUTPATIENT
Start: 2025-05-29 | End: 2025-06-28

## 2025-05-28 RX ORDER — METHYLPHENIDATE HYDROCHLORIDE 5 MG/1
TABLET ORAL
Qty: 45 TABLET | Refills: 0 | Status: SHIPPED | OUTPATIENT
Start: 2025-07-28 | End: 2025-08-27

## 2025-05-28 NOTE — TELEPHONE ENCOUNTER
Patient requesting refill of methylphenidate (RITALIN) 5 MG tablet          Last OV: 4/7/2025  Next OV: 9/7/2025      Last fill: 4/23/2025  Refills: 0

## 2025-06-17 DIAGNOSIS — F90.0 ATTENTION DEFICIT HYPERACTIVITY DISORDER (ADHD), PREDOMINANTLY INATTENTIVE TYPE: ICD-10-CM

## 2025-06-17 RX ORDER — METHYLPHENIDATE HYDROCHLORIDE 5 MG/1
TABLET ORAL
Qty: 45 TABLET | Refills: 0 | OUTPATIENT
Start: 2025-06-17 | End: 2025-07-17

## 2025-06-23 DIAGNOSIS — F90.0 ATTENTION DEFICIT HYPERACTIVITY DISORDER (ADHD), PREDOMINANTLY INATTENTIVE TYPE: ICD-10-CM

## 2025-06-23 RX ORDER — METHYLPHENIDATE HYDROCHLORIDE 5 MG/1
TABLET ORAL
Qty: 45 TABLET | Refills: 0 | OUTPATIENT
Start: 2025-06-23 | End: 2025-07-23

## 2025-06-27 ENCOUNTER — OFFICE VISIT (OUTPATIENT)
Dept: INTERNAL MEDICINE CLINIC | Age: 50
End: 2025-06-27
Payer: OTHER GOVERNMENT

## 2025-06-27 VITALS
SYSTOLIC BLOOD PRESSURE: 136 MMHG | HEART RATE: 99 BPM | DIASTOLIC BLOOD PRESSURE: 84 MMHG | TEMPERATURE: 97.6 F | BODY MASS INDEX: 26.81 KG/M2 | WEIGHT: 142 LBS | OXYGEN SATURATION: 90 % | HEIGHT: 61 IN

## 2025-06-27 DIAGNOSIS — F90.0 ATTENTION DEFICIT HYPERACTIVITY DISORDER (ADHD), PREDOMINANTLY INATTENTIVE TYPE: ICD-10-CM

## 2025-06-27 DIAGNOSIS — Z01.810 PREOP CARDIOVASCULAR EXAM: Primary | ICD-10-CM

## 2025-06-27 DIAGNOSIS — I10 ESSENTIAL HYPERTENSION: ICD-10-CM

## 2025-06-27 PROCEDURE — 99214 OFFICE O/P EST MOD 30 MIN: CPT

## 2025-06-27 PROCEDURE — 3079F DIAST BP 80-89 MM HG: CPT

## 2025-06-27 PROCEDURE — 3075F SYST BP GE 130 - 139MM HG: CPT

## 2025-06-27 PROCEDURE — G8427 DOCREV CUR MEDS BY ELIG CLIN: HCPCS

## 2025-06-27 PROCEDURE — 1036F TOBACCO NON-USER: CPT

## 2025-06-27 PROCEDURE — G8417 CALC BMI ABV UP PARAM F/U: HCPCS

## 2025-06-27 NOTE — PROGRESS NOTES
prophylaxis: as per surgeon  4. Further cardiac testing indication: is NOT  indicated as the patient has 0 RCRI clinical risk factors and demonstrates > 10 METS baseline activity and is NOT undergoing high risk surgery.  Evidence suggests further testing will NOT improve outcomes.          The patient has been evaluated for preoperative risk assessment. Based on the current clinical status and medical history, the patient is at optimal risk for the planned procedure. The final perioperative management decisions are at the discretion of the surgical and anesthesia teams.          This dictation was generated by voice recognition computer software.  Although all attempts are made to edit the dictation for accuracy, there may be errors in the transcription that are not intended.    An electronic signature was used to authenticate this note.    --Munir Mcdaniel, DO

## 2025-07-17 ENCOUNTER — TELEMEDICINE (OUTPATIENT)
Dept: PSYCHIATRY | Age: 50
End: 2025-07-17
Payer: OTHER GOVERNMENT

## 2025-07-17 DIAGNOSIS — F90.0 ATTENTION DEFICIT HYPERACTIVITY DISORDER (ADHD), PREDOMINANTLY INATTENTIVE TYPE: Primary | ICD-10-CM

## 2025-07-17 PROCEDURE — G8427 DOCREV CUR MEDS BY ELIG CLIN: HCPCS | Performed by: PSYCHIATRY & NEUROLOGY

## 2025-07-17 PROCEDURE — 99214 OFFICE O/P EST MOD 30 MIN: CPT | Performed by: PSYCHIATRY & NEUROLOGY

## 2025-07-17 RX ORDER — GUANFACINE 1 MG/1
1 TABLET, EXTENDED RELEASE ORAL NIGHTLY
Qty: 30 TABLET | Refills: 2 | Status: SHIPPED | OUTPATIENT
Start: 2025-07-17

## 2025-07-17 RX ORDER — METHYLPHENIDATE HYDROCHLORIDE 5 MG/1
TABLET ORAL
Qty: 45 TABLET | Refills: 0 | Status: SHIPPED | OUTPATIENT
Start: 2025-08-27 | End: 2025-09-26

## 2025-07-17 ASSESSMENT — PATIENT HEALTH QUESTIONNAIRE - PHQ9
9. THOUGHTS THAT YOU WOULD BE BETTER OFF DEAD, OR OF HURTING YOURSELF: NOT AT ALL
10. IF YOU CHECKED OFF ANY PROBLEMS, HOW DIFFICULT HAVE THESE PROBLEMS MADE IT FOR YOU TO DO YOUR WORK, TAKE CARE OF THINGS AT HOME, OR GET ALONG WITH OTHER PEOPLE: VERY DIFFICULT
6. FEELING BAD ABOUT YOURSELF - OR THAT YOU ARE A FAILURE OR HAVE LET YOURSELF OR YOUR FAMILY DOWN: NOT AT ALL
2. FEELING DOWN, DEPRESSED OR HOPELESS: MORE THAN HALF THE DAYS
6. FEELING BAD ABOUT YOURSELF - OR THAT YOU ARE A FAILURE OR HAVE LET YOURSELF OR YOUR FAMILY DOWN: NOT AT ALL
2. FEELING DOWN, DEPRESSED OR HOPELESS: MORE THAN HALF THE DAYS
SUM OF ALL RESPONSES TO PHQ QUESTIONS 1-9: 12
4. FEELING TIRED OR HAVING LITTLE ENERGY: NEARLY EVERY DAY
SUM OF ALL RESPONSES TO PHQ QUESTIONS 1-9: 12
SUM OF ALL RESPONSES TO PHQ QUESTIONS 1-9: 12
10. IF YOU CHECKED OFF ANY PROBLEMS, HOW DIFFICULT HAVE THESE PROBLEMS MADE IT FOR YOU TO DO YOUR WORK, TAKE CARE OF THINGS AT HOME, OR GET ALONG WITH OTHER PEOPLE: VERY DIFFICULT
9. THOUGHTS THAT YOU WOULD BE BETTER OFF DEAD, OR OF HURTING YOURSELF: NOT AT ALL
7. TROUBLE CONCENTRATING ON THINGS, SUCH AS READING THE NEWSPAPER OR WATCHING TELEVISION: NEARLY EVERY DAY
SUM OF ALL RESPONSES TO PHQ QUESTIONS 1-9: 12
3. TROUBLE FALLING OR STAYING ASLEEP: NOT AT ALL
1. LITTLE INTEREST OR PLEASURE IN DOING THINGS: NEARLY EVERY DAY
8. MOVING OR SPEAKING SO SLOWLY THAT OTHER PEOPLE COULD HAVE NOTICED. OR THE OPPOSITE - BEING SO FIDGETY OR RESTLESS THAT YOU HAVE BEEN MOVING AROUND A LOT MORE THAN USUAL: NOT AT ALL
5. POOR APPETITE OR OVEREATING: SEVERAL DAYS
8. MOVING OR SPEAKING SO SLOWLY THAT OTHER PEOPLE COULD HAVE NOTICED. OR THE OPPOSITE, BEING SO FIGETY OR RESTLESS THAT YOU HAVE BEEN MOVING AROUND A LOT MORE THAN USUAL: NOT AT ALL
1. LITTLE INTEREST OR PLEASURE IN DOING THINGS: NEARLY EVERY DAY
5. POOR APPETITE OR OVEREATING: SEVERAL DAYS
3. TROUBLE FALLING OR STAYING ASLEEP: NOT AT ALL
4. FEELING TIRED OR HAVING LITTLE ENERGY: NEARLY EVERY DAY
SUM OF ALL RESPONSES TO PHQ QUESTIONS 1-9: 12
7. TROUBLE CONCENTRATING ON THINGS, SUCH AS READING THE NEWSPAPER OR WATCHING TELEVISION: NEARLY EVERY DAY

## 2025-07-17 ASSESSMENT — ANXIETY QUESTIONNAIRES
5. BEING SO RESTLESS THAT IT IS HARD TO SIT STILL: SEVERAL DAYS
IF YOU CHECKED OFF ANY PROBLEMS ON THIS QUESTIONNAIRE, HOW DIFFICULT HAVE THESE PROBLEMS MADE IT FOR YOU TO DO YOUR WORK, TAKE CARE OF THINGS AT HOME, OR GET ALONG WITH OTHER PEOPLE: VERY DIFFICULT
6. BECOMING EASILY ANNOYED OR IRRITABLE: MORE THAN HALF THE DAYS
2. NOT BEING ABLE TO STOP OR CONTROL WORRYING: SEVERAL DAYS
3. WORRYING TOO MUCH ABOUT DIFFERENT THINGS: MORE THAN HALF THE DAYS
7. FEELING AFRAID AS IF SOMETHING AWFUL MIGHT HAPPEN: NOT AT ALL
GAD7 TOTAL SCORE: 9
IF YOU CHECKED OFF ANY PROBLEMS ON THIS QUESTIONNAIRE, HOW DIFFICULT HAVE THESE PROBLEMS MADE IT FOR YOU TO DO YOUR WORK, TAKE CARE OF THINGS AT HOME, OR GET ALONG WITH OTHER PEOPLE: VERY DIFFICULT
4. TROUBLE RELAXING: MORE THAN HALF THE DAYS
6. BECOMING EASILY ANNOYED OR IRRITABLE: MORE THAN HALF THE DAYS
2. NOT BEING ABLE TO STOP OR CONTROL WORRYING: SEVERAL DAYS
4. TROUBLE RELAXING: MORE THAN HALF THE DAYS
1. FEELING NERVOUS, ANXIOUS, OR ON EDGE: SEVERAL DAYS
1. FEELING NERVOUS, ANXIOUS, OR ON EDGE: SEVERAL DAYS
7. FEELING AFRAID AS IF SOMETHING AWFUL MIGHT HAPPEN: NOT AT ALL
3. WORRYING TOO MUCH ABOUT DIFFERENT THINGS: MORE THAN HALF THE DAYS
5. BEING SO RESTLESS THAT IT IS HARD TO SIT STILL: SEVERAL DAYS

## 2025-07-17 NOTE — PROGRESS NOTES
PSYCHIATRY PROGRESS NOTE  TELEHEALTH VISIT        Leslie Davis  1975  07/17/2025  PCP: Farida Madrigal, NIKOLE - CNP    CC:   Chief Complaint   Patient presents with    Follow-up     S:   Patient has been having a harder time with her mood, motivation, anxiety, concentration over the last couple months. She is finding it very hard to maintain a routine and stay engaged and motivated with the lifestyle changes she has before like walking and eating healthy. She is finding she is do other activities more that are maladaptive like binge watching TV, eating unhealthy foods, or shopping online. She can jump easily from one task to another d/t her level of distractability. Has a hard time calming her mind.     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 She is on her feet a lot which is good. The pay is also a lot better than what she was doing before and the job is not stressful. But she's had a hard time adapting her routine to the new job and maintaining time and energy for self care.   Pt just had carpel tunnel surgery a couple days ago so it recovering from this.     ROS: +some palpitations after taking ritalin    Brief Psych Hx:  Hosp: [facility] 2014, [facility] in 2016  Diagnoses: depression, anxiety  Meds: Wellbutrin (at first somewhat helpful), duloxetine (since 2015, helps joint pain), restoril, clonazepam. Prazosin. Early 20's tried sertraline ? Not helpful despite taking for a couple yrs. atomoxetine (somewhat helpful at 60mg)  [facility]: [facility] [place] prior to 2012, [facility] since 2015 NP through [facility]. Was seeing a therapist there as well but therapist left the practice.   Suicide Attempts: twice (2014 - OD on medications, 2015 OD on medications)     Current psychiatric medications:  Fetzima 80mg daily  Ritalin 5mg / 2.5mg   Trazodone 50mg qhs prn sleep    O:      7/17/2025     8:27 AM   Patient-Reported Vitals   Patient-Reported Weight 142

## 2025-07-28 RX ORDER — TRAZODONE HYDROCHLORIDE 50 MG/1
50 TABLET ORAL NIGHTLY
Qty: 90 TABLET | Refills: 1 | Status: SHIPPED | OUTPATIENT
Start: 2025-07-28

## 2025-07-28 NOTE — TELEPHONE ENCOUNTER
Last office visit: 4/7/25    Pending office visit: 9/4/25    Last fill: 1/27/25  # 90 with 1 refill/s

## 2025-09-04 ENCOUNTER — OFFICE VISIT (OUTPATIENT)
Dept: PSYCHIATRY | Age: 50
End: 2025-09-04
Payer: OTHER GOVERNMENT

## 2025-09-04 VITALS
BODY MASS INDEX: 27.12 KG/M2 | HEIGHT: 62 IN | DIASTOLIC BLOOD PRESSURE: 72 MMHG | SYSTOLIC BLOOD PRESSURE: 114 MMHG | HEART RATE: 91 BPM | WEIGHT: 147.4 LBS

## 2025-09-04 DIAGNOSIS — F43.23 ACUTE ADJUSTMENT DISORDER WITH MIXED ANXIETY AND DEPRESSED MOOD: ICD-10-CM

## 2025-09-04 DIAGNOSIS — F33.1 MODERATE EPISODE OF RECURRENT MAJOR DEPRESSIVE DISORDER (HCC): Primary | ICD-10-CM

## 2025-09-04 DIAGNOSIS — F90.0 ATTENTION DEFICIT HYPERACTIVITY DISORDER (ADHD), PREDOMINANTLY INATTENTIVE TYPE: ICD-10-CM

## 2025-09-04 PROCEDURE — 1036F TOBACCO NON-USER: CPT | Performed by: PSYCHIATRY & NEUROLOGY

## 2025-09-04 PROCEDURE — 3078F DIAST BP <80 MM HG: CPT | Performed by: PSYCHIATRY & NEUROLOGY

## 2025-09-04 PROCEDURE — 99214 OFFICE O/P EST MOD 30 MIN: CPT | Performed by: PSYCHIATRY & NEUROLOGY

## 2025-09-04 PROCEDURE — G8427 DOCREV CUR MEDS BY ELIG CLIN: HCPCS | Performed by: PSYCHIATRY & NEUROLOGY

## 2025-09-04 PROCEDURE — G8417 CALC BMI ABV UP PARAM F/U: HCPCS | Performed by: PSYCHIATRY & NEUROLOGY

## 2025-09-04 PROCEDURE — 3074F SYST BP LT 130 MM HG: CPT | Performed by: PSYCHIATRY & NEUROLOGY

## 2025-09-04 RX ORDER — BUSPIRONE HYDROCHLORIDE 10 MG/1
10 TABLET ORAL 3 TIMES DAILY
Qty: 90 TABLET | Refills: 1 | Status: SHIPPED | OUTPATIENT
Start: 2025-09-04

## 2025-09-04 ASSESSMENT — ANXIETY QUESTIONNAIRES
4. TROUBLE RELAXING: NEARLY EVERY DAY
2. NOT BEING ABLE TO STOP OR CONTROL WORRYING: NEARLY EVERY DAY
3. WORRYING TOO MUCH ABOUT DIFFERENT THINGS: NEARLY EVERY DAY
GAD7 TOTAL SCORE: 14
IF YOU CHECKED OFF ANY PROBLEMS ON THIS QUESTIONNAIRE, HOW DIFFICULT HAVE THESE PROBLEMS MADE IT FOR YOU TO DO YOUR WORK, TAKE CARE OF THINGS AT HOME, OR GET ALONG WITH OTHER PEOPLE: VERY DIFFICULT
6. BECOMING EASILY ANNOYED OR IRRITABLE: MORE THAN HALF THE DAYS
7. FEELING AFRAID AS IF SOMETHING AWFUL MIGHT HAPPEN: NOT AT ALL
5. BEING SO RESTLESS THAT IT IS HARD TO SIT STILL: SEVERAL DAYS
1. FEELING NERVOUS, ANXIOUS, OR ON EDGE: MORE THAN HALF THE DAYS

## 2025-09-04 ASSESSMENT — PATIENT HEALTH QUESTIONNAIRE - PHQ9
6. FEELING BAD ABOUT YOURSELF - OR THAT YOU ARE A FAILURE OR HAVE LET YOURSELF OR YOUR FAMILY DOWN: SEVERAL DAYS
SUM OF ALL RESPONSES TO PHQ QUESTIONS 1-9: 15
SUM OF ALL RESPONSES TO PHQ QUESTIONS 1-9: 15
8. MOVING OR SPEAKING SO SLOWLY THAT OTHER PEOPLE COULD HAVE NOTICED. OR THE OPPOSITE, BEING SO FIGETY OR RESTLESS THAT YOU HAVE BEEN MOVING AROUND A LOT MORE THAN USUAL: NOT AT ALL
4. FEELING TIRED OR HAVING LITTLE ENERGY: NEARLY EVERY DAY
9. THOUGHTS THAT YOU WOULD BE BETTER OFF DEAD, OR OF HURTING YOURSELF: NOT AT ALL
5. POOR APPETITE OR OVEREATING: NEARLY EVERY DAY
3. TROUBLE FALLING OR STAYING ASLEEP: MORE THAN HALF THE DAYS
2. FEELING DOWN, DEPRESSED OR HOPELESS: MORE THAN HALF THE DAYS
1. LITTLE INTEREST OR PLEASURE IN DOING THINGS: NEARLY EVERY DAY
7. TROUBLE CONCENTRATING ON THINGS, SUCH AS READING THE NEWSPAPER OR WATCHING TELEVISION: SEVERAL DAYS
SUM OF ALL RESPONSES TO PHQ QUESTIONS 1-9: 15
SUM OF ALL RESPONSES TO PHQ QUESTIONS 1-9: 15
10. IF YOU CHECKED OFF ANY PROBLEMS, HOW DIFFICULT HAVE THESE PROBLEMS MADE IT FOR YOU TO DO YOUR WORK, TAKE CARE OF THINGS AT HOME, OR GET ALONG WITH OTHER PEOPLE: VERY DIFFICULT

## (undated) DEVICE — MICRO SAGITTAL BLADE (9.5 X 0.4 X 25.5MM)

## (undated) DEVICE — BANDAGE COMPR W4INXL12FT E DISP ESMARCH EVEN

## (undated) DEVICE — SINGLE USE AIR/WATER, SUCTION AND BIOPSY VALVES SET: Brand: ORCAPOD™

## (undated) DEVICE — CANNULATED DRILL: Brand: FIXOS

## (undated) DEVICE — DRESSING PETRO W3XL3IN OIL EMUL N ADH GZ KNIT IMPREG CELOS

## (undated) DEVICE — CANNULATED COUNTERSINK

## (undated) DEVICE — BW-412T DISP COMBO CLEANING BRUSH: Brand: SINGLE USE COMBINATION CLEANING BRUSH

## (undated) DEVICE — INTENDED FOR TISSUE SEPARATION, AND OTHER PROCEDURES THAT REQUIRE A SHARP SURGICAL BLADE TO PUNCTURE OR CUT.: Brand: BARD-PARKER ® STAINLESS STEEL BLADES

## (undated) DEVICE — HYPODERMIC SAFETY NEEDLE: Brand: MAGELLAN

## (undated) DEVICE — GLOVE ORANGE PI 8   MSG9080

## (undated) DEVICE — PRECISION THIN (9.0 X 0.38 X 31.0MM)

## (undated) DEVICE — SUTURE VCRL SZ 2-0 L27IN ABSRB UD L26MM SH 1/2 CIR J417H

## (undated) DEVICE — DRAPE CARM MINI FOR IMAG SYS INSIGHT FLROSCN

## (undated) DEVICE — UNTHREADED GUIDE WIRE: Brand: FIXOS

## (undated) DEVICE — ZIMMER® A.T.S. CYLINDRICAL TOURNIQUET CUFF, DUAL PORT, SINGLE BLADDER 18 IN. (46 CM)

## (undated) DEVICE — MOUTHPIECE ENDOSCP L CTRL OPN AND SIDE PORTS DISP

## (undated) DEVICE — SUTURE VCRL + SZ 3-0 L18IN ABSRB UD PS-2 3/8 CIR REV CUT VCP497H

## (undated) DEVICE — FORCEPS BX L240CM WRK CHN 2.8MM STD CAP W/ NDL MIC MESH

## (undated) DEVICE — SOLUTION IV IRRIG WATER 500ML POUR BRL ST 2F7113

## (undated) DEVICE — Z DISCONTINUED USE 2275686 GLOVE SURG SZ 8 L12IN FNGR THK13MIL WHT ISOLEX POLYISOPRENE

## (undated) DEVICE — SUTURE ETHLN SZ 4-0 L18IN NONABSORBABLE BLK L19MM PS-2 3/8 1667H

## (undated) DEVICE — BANDAGE,GAUZE,BULKEE II,4.5"X4.1YD,STRL: Brand: MEDLINE

## (undated) DEVICE — POSITIONING PIN
Type: IMPLANTABLE DEVICE | Site: FOOT | Status: NON-FUNCTIONAL
Removed: 2019-11-11

## (undated) DEVICE — TRAY, SKIN SCRUB,GEL,LATEX FRE: Brand: MEDLINE INDUSTRIES, INC.

## (undated) DEVICE — SUTURE VCRL + SZ 5-0 L18IN ABSRB UD L19MM PS-2 3/8 CIR PRIM VCP495H

## (undated) DEVICE — MEDICINE CUP, GRADUATED, STER: Brand: MEDLINE

## (undated) DEVICE — MASC TURNOVER KIT: Brand: MEDLINE INDUSTRIES, INC.

## (undated) DEVICE — GLOVE ORTHO 8   MSG9480

## (undated) DEVICE — T-DRAPE,EXTREMITY,STERILE: Brand: MEDLINE

## (undated) DEVICE — GAUZE,SPONGE,4"X4",8PLY,STRL,LF,10/TRAY: Brand: MEDLINE

## (undated) DEVICE — DRAPE,U/ SHT,SPLIT,PLAS,STERIL: Brand: MEDLINE

## (undated) DEVICE — PACK PROCEDURE SURG EXTREMITY MFFOP CUST

## (undated) DEVICE — PADDING CAST W4INXL4YD ST COT RAYON MICROPLEATED HIGHLY

## (undated) DEVICE — DRILL BIT, AO, SCALED: Brand: VARIAX

## (undated) DEVICE — SUTURE VCRL + SZ 4-0 L18IN ABSRB UD L19MM PS-2 3/8 CIR PRIM VCP496H

## (undated) DEVICE — PIN ANCHORAGE FIX

## (undated) DEVICE — TOWEL,OR,DSP,ST,BLUE,STD,4/PK,20PK/CS: Brand: MEDLINE

## (undated) DEVICE — GOWN,AURORA,NONREINF,RAGLAN,XXL,STERILE: Brand: MEDLINE

## (undated) DEVICE — ADAPTER MON OD15X22MM ID15MM STD LUER FIT W/ LIFESAVER

## (undated) DEVICE — APPLICATOR MEDICATED 26 CC SOLUTION HI LT ORNG CHLORAPREP

## (undated) DEVICE — DRESSING FOAM W3XL3IN GENTLE SIL FACE BORD ADH PD SUP ABSRB

## (undated) DEVICE — AIR/WATER CLEANING ADAPTER FOR OLYMPUS® GI ENDOSCOPE: Brand: BULLDOG®

## (undated) DEVICE — ENDOSCOPIC KIT 6X3/16 FT COLON W/ 1.1 OZ 2 GWN W/O BRSH

## (undated) DEVICE — STANDARD DRILL BIT , AO

## (undated) DEVICE — NEEDLE HYPO 22GA L1.5IN BLK POLYPR HUB S STL REG BVL STR

## (undated) DEVICE — SYRINGE MED 10ML LUERLOCK TIP W/O SFTY DISP

## (undated) DEVICE — SOLUTION IV IRRIG 500ML 0.9% SODIUM CHL 2F7123

## (undated) DEVICE — SUTURE VCRL SZ 3-0 L27IN ABSRB UD L26MM SH 1/2 CIR J416H